# Patient Record
Sex: MALE | Race: WHITE | Employment: UNEMPLOYED | URBAN - METROPOLITAN AREA
[De-identification: names, ages, dates, MRNs, and addresses within clinical notes are randomized per-mention and may not be internally consistent; named-entity substitution may affect disease eponyms.]

---

## 2023-02-06 PROBLEM — R39.9 UTI SYMPTOMS: Status: ACTIVE | Noted: 2023-02-06

## 2023-02-06 PROBLEM — J34.89 NASAL AND SINUS DISCHARGE: Status: ACTIVE | Noted: 2023-02-06

## 2023-02-06 PROBLEM — M25.579 ANKLE PAIN: Status: ACTIVE | Noted: 2023-02-06

## 2023-02-06 PROBLEM — I10 BENIGN ESSENTIAL HYPERTENSION: Status: ACTIVE | Noted: 2023-02-06

## 2023-02-06 PROBLEM — R10.9 ABDOMINAL PAIN: Status: ACTIVE | Noted: 2023-02-06

## 2023-02-06 PROBLEM — K21.9 GASTROESOPHAGEAL REFLUX DISEASE WITHOUT ESOPHAGITIS: Status: ACTIVE | Noted: 2023-02-06

## 2023-02-06 PROBLEM — R76.8 POSITIVE ANA (ANTINUCLEAR ANTIBODY): Status: ACTIVE | Noted: 2023-02-06

## 2023-02-06 PROBLEM — M76.70 PERONEAL TENDONITIS: Status: ACTIVE | Noted: 2023-02-06

## 2023-02-06 PROBLEM — J32.9: Status: ACTIVE | Noted: 2023-02-06

## 2023-02-06 PROBLEM — J01.80 OTHER ACUTE SINUSITIS: Status: ACTIVE | Noted: 2023-02-06

## 2023-02-06 PROBLEM — F32.0 DEPRESSION, MAJOR, SINGLE EPISODE, MILD (CMS-HCC): Status: ACTIVE | Noted: 2023-02-06

## 2023-02-06 PROBLEM — J01.90 ACUTE SINUSITIS, UNSPECIFIED: Status: ACTIVE | Noted: 2023-02-06

## 2023-02-06 PROBLEM — J45.30 MILD PERSISTENT ASTHMA WITHOUT COMPLICATION (HHS-HCC): Status: ACTIVE | Noted: 2023-02-06

## 2023-02-06 PROBLEM — Z98.1 STATUS POST ANKLE FUSION: Status: ACTIVE | Noted: 2023-02-06

## 2023-02-06 PROBLEM — M79.673 FOOT PAIN: Status: ACTIVE | Noted: 2023-02-06

## 2023-02-06 PROBLEM — D36.9 ADENOMATOUS POLYP: Status: ACTIVE | Noted: 2023-02-06

## 2023-02-06 PROBLEM — J32.4 CHRONIC PANSINUSITIS: Status: ACTIVE | Noted: 2023-02-06

## 2023-02-06 PROBLEM — E11.9 CONTROLLED TYPE 2 DIABETES MELLITUS WITHOUT COMPLICATION (MULTI): Status: ACTIVE | Noted: 2023-02-06

## 2023-02-06 PROBLEM — R44.2 PHANTOSMIA: Status: ACTIVE | Noted: 2023-02-06

## 2023-02-06 PROBLEM — M25.673 ANKLE STIFFNESS: Status: ACTIVE | Noted: 2023-02-06

## 2023-02-06 PROBLEM — R44.8 FACIAL PRESSURE: Status: ACTIVE | Noted: 2023-02-06

## 2023-02-06 PROBLEM — M19.079 ANKLE ARTHRITIS: Status: ACTIVE | Noted: 2023-02-06

## 2023-02-06 PROBLEM — E78.2 MIXED HYPERLIPIDEMIA: Status: ACTIVE | Noted: 2023-02-06

## 2023-02-06 PROBLEM — R09.81 SINUS CONGESTION: Status: ACTIVE | Noted: 2023-02-06

## 2023-02-06 PROBLEM — Z96.661 HISTORY OF TOTAL REPLACEMENT OF RIGHT ANKLE: Status: ACTIVE | Noted: 2023-02-06

## 2023-02-06 PROBLEM — M25.879 ANKLE IMPINGEMENT SYNDROME: Status: ACTIVE | Noted: 2023-02-06

## 2023-02-06 PROBLEM — R11.10 VOMITING: Status: ACTIVE | Noted: 2023-02-06

## 2023-02-06 RX ORDER — ESOMEPRAZOLE MAGNESIUM 40 MG/1
40 CAPSULE, DELAYED RELEASE ORAL DAILY PRN
COMMUNITY
Start: 2021-04-07 | End: 2023-03-14 | Stop reason: SDUPTHER

## 2023-02-06 RX ORDER — DILTIAZEM HYDROCHLORIDE 240 MG/1
1 CAPSULE, EXTENDED RELEASE ORAL DAILY
COMMUNITY
Start: 2020-10-24

## 2023-02-06 RX ORDER — TAMSULOSIN HYDROCHLORIDE 0.4 MG/1
2 CAPSULE ORAL NIGHTLY
COMMUNITY
Start: 2020-11-04 | End: 2024-01-11 | Stop reason: SDUPTHER

## 2023-02-06 RX ORDER — VENLAFAXINE HYDROCHLORIDE 75 MG/1
75 CAPSULE, EXTENDED RELEASE ORAL DAILY
COMMUNITY
Start: 2022-05-26 | End: 2023-10-11 | Stop reason: DRUGHIGH

## 2023-02-06 RX ORDER — LOSARTAN POTASSIUM 25 MG/1
25 TABLET ORAL DAILY
COMMUNITY
Start: 2020-12-01

## 2023-02-06 RX ORDER — BUDESONIDE AND FORMOTEROL FUMARATE DIHYDRATE 160; 4.5 UG/1; UG/1
2 AEROSOL RESPIRATORY (INHALATION) 2 TIMES DAILY
COMMUNITY
Start: 2018-07-24

## 2023-02-06 RX ORDER — ALBUTEROL SULFATE 90 UG/1
2 AEROSOL, METERED RESPIRATORY (INHALATION) EVERY 4 HOURS PRN
COMMUNITY
Start: 2021-08-06

## 2023-02-06 RX ORDER — ROSUVASTATIN CALCIUM 10 MG/1
10 TABLET, COATED ORAL DAILY
COMMUNITY
Start: 2020-10-24 | End: 2023-10-10 | Stop reason: SDUPTHER

## 2023-02-06 RX ORDER — METFORMIN HYDROCHLORIDE 500 MG/1
1000 TABLET, EXTENDED RELEASE ORAL 2 TIMES DAILY
COMMUNITY
Start: 2018-07-14 | End: 2024-03-01 | Stop reason: SDUPTHER

## 2023-02-06 RX ORDER — TRIAMCINOLONE ACETONIDE 55 UG/1
1 SPRAY, METERED NASAL EVERY 12 HOURS
COMMUNITY
Start: 2022-05-05 | End: 2023-10-10

## 2023-03-06 PROBLEM — R11.10 VOMITING: Status: RESOLVED | Noted: 2023-02-06 | Resolved: 2023-03-06

## 2023-03-06 PROBLEM — G47.33 OBSTRUCTIVE SLEEP APNEA: Status: ACTIVE | Noted: 2023-03-06

## 2023-03-06 PROBLEM — R06.83 SNORING: Status: ACTIVE | Noted: 2023-03-06

## 2023-03-06 PROBLEM — E66.9 OBESITY, UNSPECIFIED: Status: ACTIVE | Noted: 2023-03-06

## 2023-03-06 PROBLEM — M79.671 PAIN IN BOTH FEET: Status: ACTIVE | Noted: 2023-03-06

## 2023-03-06 PROBLEM — M79.672 PAIN IN BOTH FEET: Status: ACTIVE | Noted: 2023-03-06

## 2023-03-06 PROBLEM — R39.9 UTI SYMPTOMS: Status: RESOLVED | Noted: 2023-02-06 | Resolved: 2023-03-06

## 2023-03-06 PROBLEM — E66.9 OBESITY, UNSPECIFIED: Status: RESOLVED | Noted: 2023-03-06 | Resolved: 2023-03-06

## 2023-03-06 PROBLEM — M25.571 RIGHT ANKLE PAIN: Status: ACTIVE | Noted: 2023-03-06

## 2023-03-06 PROBLEM — J01.80 OTHER ACUTE SINUSITIS: Status: RESOLVED | Noted: 2023-02-06 | Resolved: 2023-03-06

## 2023-03-14 ENCOUNTER — OFFICE VISIT (OUTPATIENT)
Dept: PRIMARY CARE | Facility: CLINIC | Age: 68
End: 2023-03-14
Payer: COMMERCIAL

## 2023-03-14 VITALS
HEIGHT: 72 IN | DIASTOLIC BLOOD PRESSURE: 80 MMHG | BODY MASS INDEX: 32.78 KG/M2 | RESPIRATION RATE: 16 BRPM | SYSTOLIC BLOOD PRESSURE: 124 MMHG | HEART RATE: 80 BPM | OXYGEN SATURATION: 95 % | TEMPERATURE: 97.5 F | WEIGHT: 242 LBS

## 2023-03-14 DIAGNOSIS — J01.10 ACUTE FRONTAL SINUSITIS, RECURRENCE NOT SPECIFIED: Primary | ICD-10-CM

## 2023-03-14 DIAGNOSIS — E11.9 CONTROLLED TYPE 2 DIABETES MELLITUS WITHOUT COMPLICATION, WITHOUT LONG-TERM CURRENT USE OF INSULIN (MULTI): ICD-10-CM

## 2023-03-14 DIAGNOSIS — K21.9 GASTROESOPHAGEAL REFLUX DISEASE, UNSPECIFIED WHETHER ESOPHAGITIS PRESENT: ICD-10-CM

## 2023-03-14 PROCEDURE — 4010F ACE/ARB THERAPY RXD/TAKEN: CPT | Performed by: FAMILY MEDICINE

## 2023-03-14 PROCEDURE — 1160F RVW MEDS BY RX/DR IN RCRD: CPT | Performed by: FAMILY MEDICINE

## 2023-03-14 PROCEDURE — 3074F SYST BP LT 130 MM HG: CPT | Performed by: FAMILY MEDICINE

## 2023-03-14 PROCEDURE — 1159F MED LIST DOCD IN RCRD: CPT | Performed by: FAMILY MEDICINE

## 2023-03-14 PROCEDURE — 99214 OFFICE O/P EST MOD 30 MIN: CPT | Performed by: FAMILY MEDICINE

## 2023-03-14 PROCEDURE — 1036F TOBACCO NON-USER: CPT | Performed by: FAMILY MEDICINE

## 2023-03-14 PROCEDURE — 3079F DIAST BP 80-89 MM HG: CPT | Performed by: FAMILY MEDICINE

## 2023-03-14 RX ORDER — ESOMEPRAZOLE MAGNESIUM 40 MG/1
40 CAPSULE, DELAYED RELEASE ORAL
Qty: 30 CAPSULE | Refills: 1 | Status: SHIPPED | OUTPATIENT
Start: 2023-03-14 | End: 2023-03-22 | Stop reason: SDUPTHER

## 2023-03-14 RX ORDER — AZITHROMYCIN 250 MG/1
TABLET, FILM COATED ORAL
Qty: 6 TABLET | Refills: 0 | Status: SHIPPED | OUTPATIENT
Start: 2023-03-14 | End: 2023-03-19

## 2023-03-14 RX ORDER — ESOMEPRAZOLE MAGNESIUM 40 MG/1
40 CAPSULE, DELAYED RELEASE ORAL
Qty: 90 CAPSULE | Refills: 1 | Status: SHIPPED | OUTPATIENT
Start: 2023-03-14 | End: 2023-03-14 | Stop reason: SDUPTHER

## 2023-03-14 ASSESSMENT — ENCOUNTER SYMPTOMS
WHEEZING: 0
SINUS PAIN: 1
SHORTNESS OF BREATH: 0
SORE THROAT: 1
NAUSEA: 1
SINUS PRESSURE: 1
FATIGUE: 1
COUGH: 1

## 2023-03-14 ASSESSMENT — PAIN SCALES - GENERAL: PAINLEVEL: 0-NO PAIN

## 2023-03-14 NOTE — PROGRESS NOTES
Subjective   Patient ID: Rui Shah is a 67 y.o. male who presents for Cough (Bad congestion , sinus headache x 1 month..Food Insecurity: Not on file and meds making sick to stomach, trouble sleeping ...took covid test neg.).    Cough  Associated symptoms include ear pain and a sore throat. Pertinent negatives include no shortness of breath or wheezing.      For last month, started with chest congestion and nonproductive cough.  Has been going on for a couple weeks and improved but still coughing.  Then noticed more in the sinuses.  Significant headaches. Negative COVID test.  Meds are making him nauseous.  Trouble sleeping.  Headaches are significant.  No fevers.  Not checking blood sugars. Eating well.  No one sick at home.   Review of Systems   Constitutional:  Positive for fatigue.   HENT:  Positive for congestion, ear pain, sinus pressure, sinus pain and sore throat.    Respiratory:  Positive for cough. Negative for shortness of breath and wheezing.    Gastrointestinal:  Positive for nausea.       Objective   /80 (BP Location: Left arm, Patient Position: Sitting, BP Cuff Size: Large adult)   Pulse 80   Temp 36.4 °C (97.5 °F) (Oral)   Resp 16   Ht 1.829 m (6')   Wt 110 kg (242 lb)   SpO2 95%   BMI 32.82 kg/m²     Physical Exam  Constitutional:       Appearance: Normal appearance.   HENT:      Head: Normocephalic and atraumatic.      Right Ear: External ear normal.      Left Ear: External ear normal.      Nose: Nose normal.   Eyes:      Extraocular Movements: Extraocular movements intact.      Conjunctiva/sclera: Conjunctivae normal.      Pupils: Pupils are equal, round, and reactive to light.   Cardiovascular:      Rate and Rhythm: Normal rate and regular rhythm.   Pulmonary:      Effort: Pulmonary effort is normal.      Breath sounds: Normal breath sounds.   Abdominal:      General: Abdomen is flat.      Palpations: Abdomen is soft.   Musculoskeletal:         General: Normal range of motion.       Cervical back: Normal range of motion and neck supple.   Skin:     General: Skin is warm.   Neurological:      General: No focal deficit present.      Mental Status: He is alert and oriented to person, place, and time.   Psychiatric:         Mood and Affect: Mood normal.         Assessment/Plan   Problem List Items Addressed This Visit          Digestive    Gastroesophageal reflux disease     Stable   Needs refill of PPI         Relevant Medications    esomeprazole (NexIUM) 40 mg DR capsule       Endocrine/Metabolic    Controlled type 2 diabetes mellitus without complication (CMS/[facility])     Has not been checking glucose regularly  Continue Metformin  Follow up for DMII visit in office to check A1C            Infectious/Inflammatory    Acute sinusitis, unspecified - Primary     Start antibiotic, take with food or probiotic  Recommend symptomatic treatment including increased hydration, sinus rinse, use of analgesics for symptoms  Follow up if symptoms fail to improve or worsen         Relevant Medications    azithromycin (Zithromax) 250 mg tablet

## 2023-03-14 NOTE — ASSESSMENT & PLAN NOTE
Start antibiotic, take with food or probiotic  Recommend symptomatic treatment including increased hydration, sinus rinse, use of analgesics for symptoms  Follow up if symptoms fail to improve or worsen

## 2023-03-14 NOTE — ASSESSMENT & PLAN NOTE
Has not been checking glucose regularly  Continue Metformin  Follow up for DMII visit in office to check A1C

## 2023-03-21 ENCOUNTER — TELEPHONE (OUTPATIENT)
Dept: PRIMARY CARE | Facility: CLINIC | Age: 68
End: 2023-03-21
Payer: MEDICARE

## 2023-03-21 DIAGNOSIS — K21.9 GASTROESOPHAGEAL REFLUX DISEASE, UNSPECIFIED WHETHER ESOPHAGITIS PRESENT: ICD-10-CM

## 2023-03-22 RX ORDER — ESOMEPRAZOLE MAGNESIUM 40 MG/1
40 CAPSULE, DELAYED RELEASE ORAL
Qty: 90 CAPSULE | Refills: 1 | Status: SHIPPED | OUTPATIENT
Start: 2023-03-22 | End: 2023-09-18

## 2023-03-27 ENCOUNTER — TELEPHONE (OUTPATIENT)
Dept: PRIMARY CARE | Facility: CLINIC | Age: 68
End: 2023-03-27
Payer: MEDICARE

## 2023-03-29 DIAGNOSIS — J01.10 ACUTE FRONTAL SINUSITIS, RECURRENCE NOT SPECIFIED: Primary | ICD-10-CM

## 2023-03-29 RX ORDER — AMOXICILLIN AND CLAVULANATE POTASSIUM 875; 125 MG/1; MG/1
875 TABLET, FILM COATED ORAL 2 TIMES DAILY
Qty: 20 TABLET | Refills: 0 | Status: SHIPPED | OUTPATIENT
Start: 2023-03-29 | End: 2023-04-08

## 2023-03-31 DIAGNOSIS — J01.10 ACUTE FRONTAL SINUSITIS, RECURRENCE NOT SPECIFIED: ICD-10-CM

## 2023-03-31 RX ORDER — AMOXICILLIN AND CLAVULANATE POTASSIUM 875; 125 MG/1; MG/1
875 TABLET, FILM COATED ORAL 2 TIMES DAILY
Qty: 20 TABLET | Refills: 0 | Status: CANCELLED | OUTPATIENT
Start: 2023-03-31 | End: 2023-04-10

## 2023-06-30 ENCOUNTER — HOSPITAL ENCOUNTER (OUTPATIENT)
Dept: DATA CONVERSION | Facility: HOSPITAL | Age: 68
End: 2023-06-30
Attending: ORTHOPAEDIC SURGERY | Admitting: ORTHOPAEDIC SURGERY
Payer: MEDICARE

## 2023-06-30 DIAGNOSIS — E78.5 HYPERLIPIDEMIA, UNSPECIFIED: ICD-10-CM

## 2023-06-30 DIAGNOSIS — Z96.661 PRESENCE OF RIGHT ARTIFICIAL ANKLE JOINT: ICD-10-CM

## 2023-06-30 DIAGNOSIS — E11.42 TYPE 2 DIABETES MELLITUS WITH DIABETIC POLYNEUROPATHY (MULTI): ICD-10-CM

## 2023-06-30 DIAGNOSIS — I10 ESSENTIAL (PRIMARY) HYPERTENSION: ICD-10-CM

## 2023-06-30 DIAGNOSIS — Z98.1 ARTHRODESIS STATUS: ICD-10-CM

## 2023-06-30 DIAGNOSIS — E66.9 OBESITY, UNSPECIFIED: ICD-10-CM

## 2023-06-30 DIAGNOSIS — M24.571 CONTRACTURE, RIGHT ANKLE: ICD-10-CM

## 2023-06-30 DIAGNOSIS — J45.909 UNSPECIFIED ASTHMA, UNCOMPLICATED (HHS-HCC): ICD-10-CM

## 2023-06-30 DIAGNOSIS — Z79.84 LONG TERM (CURRENT) USE OF ORAL HYPOGLYCEMIC DRUGS: ICD-10-CM

## 2023-06-30 DIAGNOSIS — M21.171 VARUS DEFORMITY, NOT ELSEWHERE CLASSIFIED, RIGHT ANKLE: ICD-10-CM

## 2023-06-30 LAB
POCT GLUCOSE: 202 MG/DL (ref 74–99)
POCT GLUCOSE: 242 MG/DL (ref 74–99)
POCT GLUCOSE: 265 MG/DL (ref 74–99)

## 2023-07-21 DIAGNOSIS — E11.9 CONTROLLED TYPE 2 DIABETES MELLITUS WITHOUT COMPLICATION, UNSPECIFIED WHETHER LONG TERM INSULIN USE (MULTI): Primary | ICD-10-CM

## 2023-08-01 ENCOUNTER — TELEMEDICINE (OUTPATIENT)
Dept: PHARMACY | Facility: HOSPITAL | Age: 68
End: 2023-08-01
Payer: MEDICARE

## 2023-08-01 DIAGNOSIS — E11.40 TYPE 2 DIABETES MELLITUS WITH DIABETIC NEUROPATHY, WITHOUT LONG-TERM CURRENT USE OF INSULIN (MULTI): Primary | ICD-10-CM

## 2023-08-01 DIAGNOSIS — E11.9 CONTROLLED TYPE 2 DIABETES MELLITUS WITHOUT COMPLICATION, UNSPECIFIED WHETHER LONG TERM INSULIN USE (MULTI): ICD-10-CM

## 2023-08-01 RX ORDER — DULAGLUTIDE 0.75 MG/.5ML
0.75 INJECTION, SOLUTION SUBCUTANEOUS
Qty: 2 ML | Refills: 0 | Status: SHIPPED | OUTPATIENT
Start: 2023-08-01 | End: 2023-10-09 | Stop reason: DRUGHIGH

## 2023-08-01 NOTE — PROGRESS NOTES
"[facility] Pharmacist Visit - Diabetes Management  Referring Provider: Juliane Guzman MD    Subjective   Rui Shah is a 67 y.o. male who presents for Diabetes.    HPI  PMH significant for T2DM, HTN, GERD, Depression, asthma, HLD, MARIELA.  Patient has no known history of medullary thyroid cancer, pancreatitis. Previous UTIs.  Known DM complications include peripheral neuropathy. Diagnosed 6-7 years ago  Special needs/barriers to therapy: Patient will be transitioning to Medicare coverage in next few months    Lifestyle  Diet: 3 meals/day.  BK: small meal  LN: Couple sandwiches  DN: Chicken or hamburgers w/ green beans or broccoli when his wife cooks. Takeout 2-3 times per week  Snacks: Cookies, popsicles, ice cream  Drinks: Water, occasionally diet    Physical Activity: Limited due to ankle surgery    Diabetes Management  Current Medications: Metformin XR 500mg 2 tab qAM and 2 tabs qPM  Previous Medications: None    Adherence: Takes medication as directed, misses/late doses once per week  Adverse Effects: None    Risk Reducing Meds  On ACEi/ARB: Yes  On SGLT2i: No (Caution - history of UTIs)  On GLP1-RA: No   On Statin: Yes     Glucose Monitoring  Glucometer/CGM Type: Unknown    Previous home BG readings: None  Current home BG readings: None - not checking recently    Hypoglycemia: Patient denies signs and symptoms  Hyperglycemia: Blurry vision    Diabetic Eye Exam: Up to date, summer 2022  Monofilament Foot Exam: Needs completed, last unknown    Comorbidity Assessment  HTN Diagnosis? Yes, HTN Assessment  BP Readings from Last 3 Encounters:   03/14/23 124/80   12/19/22 125/75   09/26/22 (!) 145/109     Current Regimen: losartan 25mg daily, diltiazem ER 240mg daily  BP Cuff at home? No  SMBG Readings: None  HTN at goal? Yes     HLD Diagnosis? Yes, HLD Assessment  Lab Results   Component Value Date    CHOL 145 12/19/2022     Lab Results   Component Value Date    HDL 49.1 12/19/2022     No results found for: \"LDLCALC\"  Lab " "Results   Component Value Date    TRIG 134 12/19/2022     LDL: 69 (12/19/22)    Primary prevention, Intermediate risk (DM)  LDL Goal: < 100 mg/dL  Current Regimen: rosuvastatin 10mg daily  HLD at goal? Yes     HF Diagnosis? No    Secondary Prevention  ASCVD Risk:   The 10-year ASCVD risk score (Lamin LORENZ, et al., 2019) is: 22.5%    Values used to calculate the score:      Age: 67 years      Sex: Male      Is Non- : No      Diabetic: Yes      Tobacco smoker: No      Systolic Blood Pressure: 124 mmHg      Is BP treated: Yes      HDL Cholesterol: 49.1 mg/dL      Total Cholesterol: 145 mg/dL  On Statin?: Yes, rosuvastatin 10mg daily    Immunizations Needed: Prevnar, Shingrix, Tdap, and Hepatitis B Series    Objective   Vitals  BP Readings from Last 2 Encounters:   03/14/23 124/80   12/19/22 125/75     BMI Readings from Last 1 Encounters:   03/14/23 32.82 kg/m²      Labs  A1C  Lab Results   Component Value Date    HGBA1C 9.6 (A) 06/26/2023    HGBA1C 6.7 04/02/2019    HGBA1C 8.1 11/27/2018     BMP  Lab Results   Component Value Date    CALCIUM 9.1 06/26/2023     (L) 06/26/2023    K 4.2 06/26/2023    CO2 20 (L) 06/26/2023     06/26/2023    BUN 21 06/26/2023    CREATININE 0.98 06/26/2023    GFRMALE 85 06/26/2023     LFTs  Lab Results   Component Value Date    ALT 42 06/23/2022    AST 48 (H) 06/23/2022    ALKPHOS 72 06/23/2022    BILITOT 0.6 06/23/2022     FLP  Lab Results   Component Value Date    TRIG 134 12/19/2022    CHOL 145 12/19/2022    HDL 49.1 12/19/2022     Urine Microalbumin  No results found for: \"ALBUR\", \"[id number]\"   Vitamin B12  No results found for: \"CRBKQRAD63\"      Assessment/Plan   Problem List Items Addressed This Visit       Type 2 diabetes mellitus with diabetic neuropathy, without long-term current use of insulin (CMS/[facility]) - Primary     Diabetes: Uncontrolled with last A1c 9.6% on 6/26/23. Current regimen includes Metformin XR 1000mg twice daily. Home BG readings not " checked recently. Due to A1c above goal and indication for weight loss, plan to start GLP-1 agonist.  Taking Trulicity 0.75mg once weekly as directed. Order sent to local [facility].  Continue taking metformin as directed  Start to monitor and record BG daily  Recommended updated immunizations: Shingrix, Prevnar          Patient Education:  Fasting BG goal , Postprandial BG goal <180 mg/dL  A1C goal <7%  Reviewed dietary recommendations: Substituting snacks and meals with lower-carb options (Ie: sugar free popsicles, low-calorie ice cream, low-carb bread)  Recommended integrating exercise into weekly routine as able.   For limited mobility, consider seated forms of exercise that focus of upper body movement and strength building.  Counseled patient on relevant MOA, expectations, side effects, duration of therapy, contraindications, administration, and monitoring parameters  All questions and concerns addressed. Contact pharmacist with any further questions or concerns prior to next appointment.    Clinical Pharmacist follow-up: 8/22/23, Telehealth visit  Next PCP appointment: Not scheduled    Gini Lockhart, PharmD  Clinical Pharmacist  08/01/23    Continue all meds under the continuation of care with the referring provider and clinical pharmacy team.  Verbal consent to manage patient's drug therapy was obtained from the patient. They were informed they may decline to participate or withdraw from participation in pharmacy services at any time.

## 2023-08-01 NOTE — ASSESSMENT & PLAN NOTE
Diabetes: Uncontrolled with last A1c 9.6% on 6/26/23. Current regimen includes Metformin XR 1000mg twice daily. Home BG readings not checked recently. Due to A1c above goal and indication for weight loss, plan to start GLP-1 agonist.  Taking Trulicity 0.75mg once weekly as directed. [facility].  Continue taking metformin as directed  Start to monitor and record BG daily  Recommended updated immunizations: Shingrix, Prevnar

## 2023-08-22 ENCOUNTER — TELEMEDICINE (OUTPATIENT)
Dept: PHARMACY | Facility: HOSPITAL | Age: 68
End: 2023-08-22
Payer: MEDICARE

## 2023-08-22 DIAGNOSIS — E11.40 TYPE 2 DIABETES MELLITUS WITH DIABETIC NEUROPATHY, WITHOUT LONG-TERM CURRENT USE OF INSULIN (MULTI): ICD-10-CM

## 2023-08-22 NOTE — PROGRESS NOTES
[facility] Pharmacist Visit - Diabetes Management  Referring Provider: Juliane Guzman MD    Subjective   Rui Shah is a 68 y.o. male who presents for Diabetes.    HPI  PMH significant for T2DM, HTN, GERD, Depression, asthma, HLD, MARIELA.  Patient has no known history of medullary thyroid cancer, pancreatitis. Previous UTIs.  Diagnosed 6-7 years ago. Known DM complications include peripheral neuropathy.  Special needs/barriers to therapy: None identified    [facility] Patient informed of status and $30 copay.    Lifestyle - Watching portions, limiting sugar, looking more at food labels  Diet: 3 meals/day.  BK: small meal  LN: Couple sandwiches  DN: Chicken or hamburgers w/ green beans or broccoli when his wife cooks. Takeout 2-3 times per week  Snacks: Trail mix, fruit  Drinks: Water, occasionally diet soda    Physical Activity: Limited due to ankle surgery    Diabetes Management  Current Medications: Metformin XR 500mg 2 tabs qAM and 2 tabs qPM  Previous Medications: None    Adherence: Takes medication as directed, misses/late doses once per week  Adverse Effects: None    Risk Reducing Meds  On ACEi/ARB: Yes  On SGLT2i: No (Caution - history of UTIs)  On GLP1-RA: Yes  On Statin: Yes     Glucose Monitoring  Glucometer/CGM Type: Unknown    Previous home BG readings: None  Current home BG readings: -220    Hypoglycemia: Patient denies signs and symptoms  Hyperglycemia: Blurry vision    Diabetic Eye Exam: Up to date, summer 2022  Monofilament Foot Exam: Needs completed, last unknown    Comorbidity Assessment  HTN Diagnosis? Yes, HTN Assessment  BP Readings from Last 3 Encounters:   03/14/23 124/80   12/19/22 125/75   09/26/22 (!) 145/109     Current Regimen: losartan 25mg daily, diltiazem ER 240mg daily  BP Cuff at home? No  SMBG Readings: None  HTN at goal? Yes     HLD Diagnosis? Yes, HLD Assessment  Lab Results   Component Value Date    CHOL 145 12/19/2022     Lab Results   Component Value Date     "HDL 49.1 12/19/2022     No results found for: \"LDLCALC\"  Lab Results   Component Value Date    TRIG 134 12/19/2022     LDL: 69 (12/19/22)    Primary prevention, Intermediate risk (DM)  LDL Goal: < 100 mg/dL  Current Regimen: rosuvastatin 10mg daily  HLD at goal? Yes      Secondary Prevention  ASCVD Risk:   The 10-year ASCVD risk score (Lamin LORENZ, et al., 2019) is: 24.3%    Values used to calculate the score:      Age: 68 years      Sex: Male      Is Non- : No      Diabetic: Yes      Tobacco smoker: No      Systolic Blood Pressure: 124 mmHg      Is BP treated: Yes      HDL Cholesterol: 49.1 mg/dL      Total Cholesterol: 145 mg/dL  On Statin?: Yes, rosuvastatin 10mg daily    Immunizations Needed: Prevnar, Shingrix, Tdap, and Hepatitis B Series    Objective   Vitals  BP Readings from Last 2 Encounters:   03/14/23 124/80   12/19/22 125/75     BMI Readings from Last 1 Encounters:   03/14/23 32.82 kg/m²      Labs  A1C  Lab Results   Component Value Date    HGBA1C 9.6 (A) 06/26/2023    HGBA1C 6.7 04/02/2019    HGBA1C 8.1 11/27/2018     BMP  Lab Results   Component Value Date    CALCIUM 9.1 06/26/2023     (L) 06/26/2023    K 4.2 06/26/2023    CO2 20 (L) 06/26/2023     06/26/2023    BUN 21 06/26/2023    CREATININE 0.98 06/26/2023    GFRMALE 85 06/26/2023     LFTs  Lab Results   Component Value Date    ALT 42 06/23/2022    AST 48 (H) 06/23/2022    ALKPHOS 72 06/23/2022    BILITOT 0.6 06/23/2022     FLP  Lab Results   Component Value Date    TRIG 134 12/19/2022    CHOL 145 12/19/2022    HDL 49.1 12/19/2022     Urine Microalbumin  No results found for: \"ALBUR\", \"[id number]\"   Vitamin B12  No results found for: \"[id number]\"      Assessment/Plan   Problem List Items Addressed This Visit       Type 2 diabetes mellitus with diabetic neuropathy, without long-term current use of insulin (CMS/[facility])     Diabetes: Uncontrolled with last A1c 9.6% on 6/26/23. Current regimen includes metformin. Trulicity " PA approved today, patient will start medication this week. Home BG readings FBG ranging from 130-220. Patient has made significant dietary improvements, reading food labels and limiting carbs and portion sizes. Due to FBG above goal, plan to start Trulicity as previously discussed.  Start taking Trulicity 0.75mg once daily  Continue taking metformin as directed  Continue to monitor and record BG daily  Continue to focus on dietary improvements, great changes so far.          Patient Education:  Fasting BG goal , Postprandial BG goal <180 mg/dL  A1C goal <7%  Reviewed dietary recommendations: Substituting snacks and meals with lower-carb options  Counseled patient on relevant MOA, expectations, side effects, duration of therapy, contraindications, administration, and monitoring parameters  All questions and concerns addressed. Contact pharmacist with any further questions or concerns prior to next appointment.    Clinical Pharmacist follow-up: 9/12/23 at 1:30pm, Telehealth visit  Next PCP appointment: Not scheduled    Gini Lockhart [facility]  Clinical Pharmacist  08/22/23    Continue all meds under the continuation of care with the referring provider and clinical pharmacy team.  Verbal consent to manage patient's drug therapy was obtained from the patient. They were informed they may decline to participate or withdraw from participation in pharmacy services at any time.

## 2023-08-22 NOTE — ASSESSMENT & PLAN NOTE
Diabetes: Uncontrolled with last A1c 9.6% on 6/26/23. Current regimen includes metformin. Trulicity PA approved today, patient will start medication this week. Home BG readings FBG ranging from 130-220. Patient has made significant dietary improvements, reading food labels and limiting carbs and portion sizes. Due to FBG above goal, plan to start Trulicity as previously discussed.  Start taking Trulicity 0.75mg once daily  Continue taking metformin as directed  Continue to monitor and record BG daily  Continue to focus on dietary improvements, great changes so far.

## 2023-09-11 NOTE — PROGRESS NOTES
[facility] Pharmacist Visit - Diabetes Management  Referring Provider: Juliane Guzman MD    Subjective   Rui Shah is a 68 y.o. male who presents for No chief complaint on file..    HPI  PMH significant for T2DM, HTN, GERD, Depression, asthma, HLD, MARIELA.  Patient has no known history of medullary thyroid cancer, pancreatitis. Previous UTIs.  Diagnosed 6-7 years ago. Known DM complications include peripheral neuropathy.  Special needs/barriers to therapy: None identified    Lifestyle: Has noticed appetite is reduced, eating smaller portions.  Diet: 3 meals/day.  BK: small meal  LN: Couple sandwiches  DN: Chicken or hamburgers w/ green beans or broccoli when his wife cooks. Takeout 2-3 times per week  Snacks: Trail mix, fruit  Drinks: Water, occasionally diet soda     Physical Activity: Limited due to ankle surgery      Diabetes Management  Current Medications: Metformin XR 500mg 2 tabs qAM and 2 tabs qPM, Trulicity 0.75mg weekly (3 doses completed, Saturdays)  Previous Medications: None     Adherence:  Takes medication as directed, misses/late doses once per week .   Adverse Effects: None    Risk Reducing Meds  On ACEi/ARB: Yes   On SGLT2i: No (Caution - history of UTIs)   On GLP1-RA: Yes   On Statin: Yes     Glucose Monitoring  Glucometer/CGM Type: Glucometer    Previous home BG readings: (8/22/23) -220  Current home BG readings: -170    Hypoglycemia: Patient denies signs and symptoms and No BG readings < 80 mg/dL  Hyperglycemia: Denies signs and symptoms    Diabetic Eye Exam: Up to date, completed summer 2022  Monofilament Foot Exam: Needs completed, last unknown      Secondary Prevention  ASCVD Risk:   The 10-year ASCVD risk score (Lamin LORENZ, et al., 2019) is: 24.3%    Values used to calculate the score:      Age: 68 years      Sex: Male      Is Non- : No      Diabetic: Yes      Tobacco smoker: No      Systolic Blood Pressure: 124 mmHg      Is BP treated: Yes      HDL Cholesterol:  "49.1 mg/dL      Total Cholesterol: 145 mg/dL  On Statin?: Yes, rosuvastatin 10mg daily    BP Readings from Last 3 Encounters:   03/14/23 124/80   12/19/22 125/75   09/26/22 (!) 145/109     Current HTN Regimen: losartan 25mg daily, diltiazem ER 240mg daily   HTN at goal? Yes    Immunizations Needed: Prevnar, Shingrix, Tdap, and Hepatitis B Series  Tobacco Use: non-smoker      Objective   Vitals  BP Readings from Last 2 Encounters:   03/14/23 124/80   12/19/22 125/75     BMI Readings from Last 1 Encounters:   03/14/23 32.82 kg/m²      Labs  A1C  Lab Results   Component Value Date    HGBA1C 9.6 (A) 06/26/2023    HGBA1C 6.7 04/02/2019    HGBA1C 8.1 11/27/2018     BMP  Lab Results   Component Value Date    CALCIUM 9.1 06/26/2023     (L) 06/26/2023    K 4.2 06/26/2023    CO2 20 (L) 06/26/2023     06/26/2023    BUN 21 06/26/2023    CREATININE 0.98 06/26/2023    GFRMALE 85 06/26/2023     LFTs  Lab Results   Component Value Date    ALT 42 06/23/2022    AST 48 (H) 06/23/2022    ALKPHOS 72 06/23/2022    BILITOT 0.6 06/23/2022     FLP  Lab Results   Component Value Date    TRIG 134 12/19/2022    CHOL 145 12/19/2022    LDLF 69 12/19/2022    HDL 49.1 12/19/2022     Urine Microalbumin  No results found for: \"MICROALBCREA\"  Vitamin B12  No results found for: \"HFTUCYRR37\"      Assessment/Plan   Problem List Items Addressed This Visit    None    Patient Education:  Counseled patient on relevant MOA, expectations, side effects, duration of therapy, contraindications, administration, and monitoring parameters.  All questions and concerns addressed. Contact pharmacist with any further questions or concerns prior to next appointment.  Reviewed BG goals: Fasting BG goal , Postprandial BG goal <180 mg/dL, A1C goal <7%    Clinical Pharmacist follow-up: 10/9/23 at 10:30am, Telehealth visit  Next PCP appointment: Not scheduled    Gini Lockhart [facility]  Clinical Pharmacist  09/11/23    Continue all meds under the " continuation of care with the referring provider and clinical pharmacy team.  Verbal consent to manage patient's drug therapy was obtained from the patient. They were informed they may decline to participate or withdraw from participation in pharmacy services at any time.

## 2023-09-12 ENCOUNTER — TELEMEDICINE (OUTPATIENT)
Dept: PHARMACY | Facility: HOSPITAL | Age: 68
End: 2023-09-12
Payer: MEDICARE

## 2023-09-12 DIAGNOSIS — E11.40 TYPE 2 DIABETES MELLITUS WITH DIABETIC NEUROPATHY, WITHOUT LONG-TERM CURRENT USE OF INSULIN (MULTI): ICD-10-CM

## 2023-09-12 RX ORDER — DULAGLUTIDE 1.5 MG/.5ML
1.5 INJECTION, SOLUTION SUBCUTANEOUS
Qty: 2 ML | Refills: 0 | Status: SHIPPED | OUTPATIENT
Start: 2023-09-12 | End: 2023-10-09 | Stop reason: DRUGHIGH

## 2023-09-12 NOTE — ASSESSMENT & PLAN NOTE
Diabetes: Uncontrolled with last A1c 9.6% on 6/26/23. Current regimen includes metformin and Trulicity 0.75mg weekly. Has completed 3 doses of Trulicity and tolerating well. He has noticed reduced appetite and is eating smaller portions. Home BG readings reduced with -170. Due to FBG still above goal and tolerating medication well, plan to increase dose of Trulicity to 1.5mg following completion of last 0.75mg dose..  Increase Trulicity to 1.5mg once weekly after completing remaining dose of 0.75mg.  Continue taking metformin.  Continue to monitor and record BG daily.  Continue to focus on dietary improvements.

## 2023-09-18 DIAGNOSIS — K21.9 GASTROESOPHAGEAL REFLUX DISEASE, UNSPECIFIED WHETHER ESOPHAGITIS PRESENT: ICD-10-CM

## 2023-09-18 RX ORDER — ESOMEPRAZOLE MAGNESIUM 40 MG/1
40 CAPSULE, DELAYED RELEASE ORAL
Qty: 90 CAPSULE | Refills: 3 | Status: SHIPPED | OUTPATIENT
Start: 2023-09-18 | End: 2023-10-12 | Stop reason: SDUPTHER

## 2023-09-28 DIAGNOSIS — E11.40 TYPE 2 DIABETES MELLITUS WITH DIABETIC NEUROPATHY, WITHOUT LONG-TERM CURRENT USE OF INSULIN (MULTI): ICD-10-CM

## 2023-09-28 DIAGNOSIS — K21.9 GASTROESOPHAGEAL REFLUX DISEASE, UNSPECIFIED WHETHER ESOPHAGITIS PRESENT: ICD-10-CM

## 2023-09-28 RX ORDER — DULAGLUTIDE 1.5 MG/.5ML
1.5 INJECTION, SOLUTION SUBCUTANEOUS
OUTPATIENT
Start: 2023-09-28

## 2023-09-29 VITALS
HEART RATE: 89 BPM | RESPIRATION RATE: 16 BRPM | HEIGHT: 73 IN | SYSTOLIC BLOOD PRESSURE: 137 MMHG | DIASTOLIC BLOOD PRESSURE: 92 MMHG | WEIGHT: 236.77 LBS | TEMPERATURE: 97.3 F | BODY MASS INDEX: 31.38 KG/M2

## 2023-09-30 NOTE — H&P
History of Present Illness:   History Present Illness:  Reason for surgery: R distal tibial/fibula osteotomy   HPI:     Patient is a 67-year-old male with non-insulin-dependent diabetes. Patient had a right ankle replacement done by Dr. Keon Amador back in 2018. He says he is never  really been able to dorsiflex his ankle. He had a equinus contracture release after the surgery which really did not help he is tried bracing which did not help. He also has a left ankle fusion and he feels he walks on the lateral border of his foot on  the side but the left is not as nearly as bad as the right problem. He says he feels like it is getting worse. 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 At that time I recommended a distal tibial osteotomy be done with a fibular osteotomy using a anterior plate.    Allergies:        Allergies:  ·  gabapentin : Facial Swelling  ·  gabapentin  containing compounds: Unknown, [facility]    Home Medication Review:   Home Medications Reviewed: yes     Impression/Procedure:   ·  Impression and Planned Procedure: R distal tibial/fibula osteotomy       ERAS (Enhanced Recovery After Surgery):  ·  ERAS Patient: no       Vital Signs:  Temperature C: 36.3 degrees C   Temperature F: 97.3 degrees F   Heart Rate: 89 beats per minute   Respiratory Rate: 16 breath per minute   Blood Pressure Systolic: 137 mm/Hg   Blood Pressure Diastolic: 92 mm/Hg     Physical Exam by System:    Respiratory/Thorax: non labored breathing, on room  air   Cardiovascular: extremities warm and well perfused   Musculoskeletal: RLE:   -Skin intact  -Tender at site of injury with painful ROM.  -Motor intact in DF/PF/EHL/FHL  -SILT in saph/sural/SPN/DPN distributions  -Foot warm, well perfused  -DP/PT pulse, brisk cap refill  -Compartments soft and compressible.    Psychological: appropriate behavior     Consent:   COVID-19 Consent:  ·  COVID-19 Risk Consent Surgeon has reviewed key risks related to the risk of sandy COVID-19 and if they contract COVID-19 what the risks are.     Attestation:   Note Completion:  I am a:  Resident/Fellow   Attending Attestation I saw and evaluated the patient.  I personally obtained the key and critical portions of the history and physical exam or was physically present for key and  critical portions performed by the resident/fellow. I reviewed the resident/fellow?s documentation and discussed the patient with the resident/fellow.  I agree with the resident/fellow?s medical decision making as documented in the note.     I personally evaluated the patient on 30-Jun-2023         Electronic Signatures:  Lucy Ji (Resident))  (Signed 30-Jun-2023 06:11)   Authored: History of Present Illness, Allergies, Home  Medication Review, Impression/Procedure, ERAS, Physical Exam, Consent, Note Completion  Keon Kaur)  (Signed 30-Jun-2023 13:17)   Authored: Physical Exam, Note Completion   Co-Signer: History of Present Illness, Allergies, Home Medication Review, Impression/Procedure, ERAS, Physical Exam, Consent, Note Completion      Last Updated: 30-Jun-2023 13:17 by Keon Kaur)

## 2023-10-02 NOTE — OP NOTE
PROCEDURE DETAILS    Preoperative Diagnosis:  Right ankle impingement with distal tibia deformity   Postoperative Diagnosis:  Right ankle impingement with distal tibia deformity   Surgeon: Dr. Kaur  Resident/Fellow/Other Assistant: Velia/Felisha    Procedure:  1) Right distal tibia osteotomy   2) Right distal fibula osteotomy   3) Application of bone allograft   Anesthesia: General  Estimated Blood Loss: 25 cc    Blood Replaced: None  Findings: Distal tibia varus and apex anterior deformity with correction after osteotomy   Specimens(s) Collected: no,     Complications: None  Urine Output: None  Drains and/or Catheters: None  Implants: Synthes 3.5 mm small frag anterolateral distal tibia plate with locking and cortical screws  Tourniquet Times: 95 minutes at 300 mm Hg  Additional Details: Weightbearing Status/precautions: NWB RLE in splint  Imaging: None  Perioperative ABx: None   DVT PPx: ASA 81 mg BID  Dressing: Splint, maintain until follow up   Drain: None  Vila: None    Dispo: Home from PACU  Please call or page with questions or concerns.   Patient Returned To/Condition: PACU to home/good                                Attestation:   Note Completion:  Attending Attestation I was present for the entire procedure    I am a: Resident/Fellow         Electronic Signatures:  Clark Gunn (Resident))  (Signed 30-Jun-2023 10:42)   Authored: Post-Operative Note, Chart Review, Note Completion  Keon Kaur)  (Signed 30-Jun-2023 13:21)   Authored: Note Completion   Co-Signer: Post-Operative Note, Chart Review, Note Completion      Last Updated: 30-Jun-2023 13:21 by Keon Kaur)

## 2023-10-05 ENCOUNTER — TREATMENT (OUTPATIENT)
Dept: PHYSICAL THERAPY | Facility: CLINIC | Age: 68
End: 2023-10-05
Payer: MEDICARE

## 2023-10-05 DIAGNOSIS — M25.579 ANKLE PAIN: Primary | ICD-10-CM

## 2023-10-09 ENCOUNTER — TELEMEDICINE (OUTPATIENT)
Dept: PHARMACY | Facility: HOSPITAL | Age: 68
End: 2023-10-09
Payer: MEDICARE

## 2023-10-09 DIAGNOSIS — E11.40 TYPE 2 DIABETES MELLITUS WITH DIABETIC NEUROPATHY, WITHOUT LONG-TERM CURRENT USE OF INSULIN (MULTI): Primary | ICD-10-CM

## 2023-10-09 RX ORDER — DULAGLUTIDE 0.75 MG/.5ML
0.75 INJECTION, SOLUTION SUBCUTANEOUS
Qty: 2 ML | Refills: 0 | Status: SHIPPED | OUTPATIENT
Start: 2023-10-09 | End: 2023-11-07 | Stop reason: SINTOL

## 2023-10-09 NOTE — PROGRESS NOTES
[facility] Pharmacist Visit - Diabetes Management  Referring Provider: Juliane Guzman MD    Subjective   Rui Shah is a 68 y.o. male who presents for Diabetes.    HPI  PMH significant for T2DM, HTN, GERD, Depression, asthma, HLD, MARIELA.  Patient has no known history of medullary thyroid cancer, pancreatitis. Previous UTIs.  Diagnosed 6-7 years ago. Known DM complications include peripheral neuropathy.  Special needs/barriers to therapy: None identified    Lifestyle: Has noticed appetite is reduced, eating smaller portions. No recent changes  Diet: 3 meals/day.  BK: small meal  LN: Couple sandwiches  DN: Chicken or hamburgers w/ green beans or broccoli when his wife cooks. Takeout 2-3 times per week  Snacks: Trail mix, fruit  Drinks: Water, occasionally diet soda     Physical Activity: Limited due to ankle surgery, doing PT    Has lost ~15 lbs over past 2 months    Diabetes Management  Current Medications: Metformin XR 500mg 2 tabs qAM and 2 tabs qPM, Trulicity 1.5mg weekly (3 doses completed, Saturdays)  Previous Medications: None    Adherence:  Takes medication as directed, misses/late doses once per week .  Adverse Effects: Vomiting 3-5 times over past week (occurs at various days/times, unrelated to food, denies any other symptoms)    Risk Reducing Meds  On ACEi/ARB: Yes   On SGLT2i: No (Caution - history of UTIs)   On GLP1-RA: Yes   On Statin: Yes     Glucose Monitoring  Glucometer/CGM Type: Onetouch Ultra 2    Previous home BG readings: (9/12/23) -170  Current home BG readings: FBG 98 - 149    Hypoglycemia: Patient denies signs and symptoms and No BG readings < 80 mg/dL  Hyperglycemia: Denies signs and symptoms    Diabetic Eye Exam: Needs completed, last summer 2022  Monofilament Foot Exam: Needs completed, last unknown      Secondary Prevention  ASCVD Risk:   The 10-year ASCVD risk score (Lamin LORENZ, et al., 2019) is: 28.4%    Values used to calculate the score:      Age: 68 years      Sex: Male      Is  "Non- : No      Diabetic: Yes      Tobacco smoker: No      Systolic Blood Pressure: 137 mmHg      Is BP treated: Yes      HDL Cholesterol: 49.1 mg/dL      Total Cholesterol: 145 mg/dL  On Statin?: Yes, rosuvastatin 10mg daily    BP Readings from Last 3 Encounters:   03/14/23 124/80   12/19/22 125/75   09/26/22 (!) 145/109     Current HTN Regimen: losartan 25mg daily, diltiazem ER 240mg daily   HTN at goal? Yes    Immunizations Needed: Prevnar, Shingrix, Tdap, and Hepatitis B Series  Tobacco Use: non-smoker      Objective   Vitals  BP Readings from Last 2 Encounters:   03/14/23 124/80   12/19/22 125/75     BMI Readings from Last 1 Encounters:   03/14/23 32.82 kg/m²      Labs  A1C  Lab Results   Component Value Date    HGBA1C 9.6 (A) 06/26/2023    HGBA1C 6.7 04/02/2019    HGBA1C 8.1 11/27/2018     BMP  Lab Results   Component Value Date    CALCIUM 9.1 06/26/2023     (L) 06/26/2023    K 4.2 06/26/2023    CO2 20 (L) 06/26/2023     06/26/2023    BUN 21 06/26/2023    CREATININE 0.98 06/26/2023    GFRMALE 85 06/26/2023     LFTs  Lab Results   Component Value Date    ALT 42 06/23/2022    AST 48 (H) 06/23/2022    ALKPHOS 72 06/23/2022    BILITOT 0.6 06/23/2022     FLP  Lab Results   Component Value Date    TRIG 134 12/19/2022    CHOL 145 12/19/2022    LDLF 69 12/19/2022    HDL 49.1 12/19/2022     Urine Microalbumin  No results found for: \"MICROALBCREA\"  Vitamin B12  No results found for: \"BHUOETTI18\"      Assessment/Plan   Problem List Items Addressed This Visit       Type 2 diabetes mellitus with diabetic neuropathy, without long-term current use of insulin (CMS/[facility]) - Primary     Diabetes: Uncontrolled with last A1c 9.6% on 6/26/23. Due for updated A1c. Current regimen includes metformin and Trulicity 1.5mg weekly. Patient has been experiencing bouts of nausea and vomiting over past 1-2 weeks, unrelated to food, denies other symptoms. Potentially related to increased dose of Trulicity. " Reviewed signs and symptoms of DKA (not suspected at this time) and when to seek emergency treatment. Home BG readings FBG 98 - 149. Due to nausea and vomiting, plan to reduce dose of Trulicity to 0.75mg weekly to see if symptoms improve. Patient will also discuss with PCP at upcoming appt.  Decrease Trulicity to 0.75mg weekly.  Continue taking Metformin XR 500mg 2 tabs qAM and 2 tabs qPM  Continue to monitor and record BG daily  Refill for test strips sent to local pharmacy.         Relevant Medications    blood sugar diagnostic strip    dulaglutide (Trulicity) 0.75 mg/0.5 mL pen injector    Other Relevant Orders    Follow Up In [facility] - Pharmacy     Patient Education:  Counseled patient on relevant MOA, expectations, side effects, duration of therapy, contraindications, administration, and monitoring parameters.  All questions and concerns addressed. Contact pharmacist with any further questions or concerns prior to next appointment.  Reviewed BG goals: Fasting BG goal , Postprandial BG goal <180 mg/dL, A1C goal <7%    Clinical Pharmacist follow-up: 10/30/23 at 10:30am, Telehealth visit  Next PCP appointment: 10/10/23    Gini Lockhart, PharmD  Clinical Pharmacist  10/09/23    Continue all meds under the continuation of care with the referring provider and clinical pharmacy team.  Verbal consent to manage patient's drug therapy was obtained from the patient. They were informed they may decline to participate or withdraw from participation in pharmacy services at any time.

## 2023-10-09 NOTE — PATIENT INSTRUCTIONS
Thank you for engaging in your appointment with Gini Lockhart, PharmD today! Please review the following instructions related to your care:    Decrease Trulicity to 0.75mg weekly  Continue taking metformin XR 500mg 2 tablets twice daily with meals.  If you have persistent nausea and vomiting accompanied by rapid/difficult breathing, dry skin and mouth, flushed face, fruity smelling breath, and/or muscle aches - Seek emergency treatment.  Prescription for Onetouch Ultra 2 test strips has been sent to [facility] pharmacy.  Check your blood glucose every morning and record.  Your Fasting BG goal is . Your BG goal 2 hours after a meal is <180. Your A1C goal is <7%.  Contact your pharmacist, Shahida, at [phone] with any questions or concerns prior to your next appointment.    Clinical Pharmacist follow-up: 10/30/23 at 10:30am, Telehealth visit  Next PCP appointment: 10/10/23

## 2023-10-09 NOTE — ASSESSMENT & PLAN NOTE
Diabetes: Uncontrolled with last A1c 9.6% on 6/26/23. Due for updated A1c. Current regimen includes metformin and Trulicity 1.5mg weekly. Patient has been experiencing bouts of nausea and vomiting over past 1-2 weeks, unrelated to food, denies other symptoms. Potentially related to increased dose of Trulicity. Reviewed signs and symptoms of DKA (not suspected at this time) and when to seek emergency treatment. Home BG readings FBG 98 - 149. Due to nausea and vomiting, plan to reduce dose of Trulicity to 0.75mg weekly to see if symptoms improve. Patient will also discuss with PCP at upcoming appt.  Decrease Trulicity to 0.75mg weekly.  Continue taking Metformin XR 500mg 2 tabs qAM and 2 tabs qPM  Continue to monitor and record BG daily  Refill for test strips sent to local pharmacy.

## 2023-10-10 ENCOUNTER — OFFICE VISIT (OUTPATIENT)
Dept: PRIMARY CARE | Facility: CLINIC | Age: 68
End: 2023-10-10
Payer: MEDICARE

## 2023-10-10 VITALS
TEMPERATURE: 97.5 F | BODY MASS INDEX: 31.07 KG/M2 | WEIGHT: 234.47 LBS | HEART RATE: 112 BPM | SYSTOLIC BLOOD PRESSURE: 138 MMHG | HEIGHT: 73 IN | OXYGEN SATURATION: 97 % | DIASTOLIC BLOOD PRESSURE: 82 MMHG

## 2023-10-10 DIAGNOSIS — E11.9 CONTROLLED TYPE 2 DIABETES MELLITUS WITHOUT COMPLICATION, WITHOUT LONG-TERM CURRENT USE OF INSULIN (MULTI): Primary | ICD-10-CM

## 2023-10-10 DIAGNOSIS — F32.0 DEPRESSION, MAJOR, SINGLE EPISODE, MILD (CMS-HCC): ICD-10-CM

## 2023-10-10 LAB
ALBUMIN SERPL BCP-MCNC: 4.5 G/DL (ref 3.4–5)
ALP SERPL-CCNC: 135 U/L (ref 33–136)
ALT SERPL W P-5'-P-CCNC: 24 U/L (ref 10–52)
ANION GAP SERPL CALC-SCNC: 17 MMOL/L (ref 10–20)
AST SERPL W P-5'-P-CCNC: 22 U/L (ref 9–39)
BILIRUB SERPL-MCNC: 0.4 MG/DL (ref 0–1.2)
BUN SERPL-MCNC: 13 MG/DL (ref 6–23)
CALCIUM SERPL-MCNC: 9.6 MG/DL (ref 8.6–10.6)
CHLORIDE SERPL-SCNC: 104 MMOL/L (ref 98–107)
CO2 SERPL-SCNC: 22 MMOL/L (ref 21–32)
CREAT SERPL-MCNC: 0.79 MG/DL (ref 0.5–1.3)
ERYTHROCYTE [DISTWIDTH] IN BLOOD BY AUTOMATED COUNT: 13.7 % (ref 11.5–14.5)
EST. AVERAGE GLUCOSE BLD GHB EST-MCNC: 163 MG/DL
GFR SERPL CREATININE-BSD FRML MDRD: >90 ML/MIN/1.73M*2
GLUCOSE SERPL-MCNC: 121 MG/DL (ref 74–99)
HBA1C MFR BLD: 7.3 %
HCT VFR BLD AUTO: 46.7 % (ref 41–52)
HGB BLD-MCNC: 14.9 G/DL (ref 13.5–17.5)
MCH RBC QN AUTO: 28.5 PG (ref 26–34)
MCHC RBC AUTO-ENTMCNC: 31.9 G/DL (ref 32–36)
MCV RBC AUTO: 89 FL (ref 80–100)
NRBC BLD-RTO: 0 /100 WBCS (ref 0–0)
PLATELET # BLD AUTO: 250 X10*3/UL (ref 150–450)
PMV BLD AUTO: 8.9 FL (ref 7.5–11.5)
POTASSIUM SERPL-SCNC: 4 MMOL/L (ref 3.5–5.3)
PROT SERPL-MCNC: 7.2 G/DL (ref 6.4–8.2)
RBC # BLD AUTO: 5.23 X10*6/UL (ref 4.5–5.9)
SODIUM SERPL-SCNC: 139 MMOL/L (ref 136–145)
TSH SERPL-ACNC: 3.15 MIU/L (ref 0.44–3.98)
WBC # BLD AUTO: 5.8 X10*3/UL (ref 4.4–11.3)

## 2023-10-10 PROCEDURE — 1126F AMNT PAIN NOTED NONE PRSNT: CPT | Performed by: FAMILY MEDICINE

## 2023-10-10 PROCEDURE — 1159F MED LIST DOCD IN RCRD: CPT | Performed by: FAMILY MEDICINE

## 2023-10-10 PROCEDURE — 3008F BODY MASS INDEX DOCD: CPT | Performed by: FAMILY MEDICINE

## 2023-10-10 PROCEDURE — 36415 COLL VENOUS BLD VENIPUNCTURE: CPT

## 2023-10-10 PROCEDURE — 4010F ACE/ARB THERAPY RXD/TAKEN: CPT | Performed by: FAMILY MEDICINE

## 2023-10-10 PROCEDURE — 3079F DIAST BP 80-89 MM HG: CPT | Performed by: FAMILY MEDICINE

## 2023-10-10 PROCEDURE — 1036F TOBACCO NON-USER: CPT | Performed by: FAMILY MEDICINE

## 2023-10-10 PROCEDURE — 3075F SYST BP GE 130 - 139MM HG: CPT | Performed by: FAMILY MEDICINE

## 2023-10-10 PROCEDURE — 83036 HEMOGLOBIN GLYCOSYLATED A1C: CPT

## 2023-10-10 PROCEDURE — 99214 OFFICE O/P EST MOD 30 MIN: CPT | Performed by: FAMILY MEDICINE

## 2023-10-10 PROCEDURE — 84443 ASSAY THYROID STIM HORMONE: CPT

## 2023-10-10 PROCEDURE — 3051F HG A1C>EQUAL 7.0%<8.0%: CPT | Performed by: FAMILY MEDICINE

## 2023-10-10 PROCEDURE — 85027 COMPLETE CBC AUTOMATED: CPT

## 2023-10-10 PROCEDURE — 1160F RVW MEDS BY RX/DR IN RCRD: CPT | Performed by: FAMILY MEDICINE

## 2023-10-10 PROCEDURE — 80053 COMPREHEN METABOLIC PANEL: CPT

## 2023-10-10 RX ORDER — ROSUVASTATIN CALCIUM 10 MG/1
10 TABLET, COATED ORAL DAILY
Qty: 90 TABLET | Refills: 3 | Status: SHIPPED | OUTPATIENT
Start: 2023-10-10

## 2023-10-10 RX ORDER — BUPROPION HYDROCHLORIDE 150 MG/1
150 TABLET ORAL EVERY MORNING
Qty: 30 TABLET | Refills: 11 | Status: SHIPPED | OUTPATIENT
Start: 2023-10-10 | End: 2023-11-01

## 2023-10-10 ASSESSMENT — PATIENT HEALTH QUESTIONNAIRE - PHQ9
3. TROUBLE FALLING OR STAYING ASLEEP OR SLEEPING TOO MUCH: NOT AT ALL
9. THOUGHTS THAT YOU WOULD BE BETTER OFF DEAD, OR OF HURTING YOURSELF: MORE THAN HALF THE DAYS
1. LITTLE INTEREST OR PLEASURE IN DOING THINGS: NOT AT ALL
SUM OF ALL RESPONSES TO PHQ QUESTIONS 1-9: 18
1. LITTLE INTEREST OR PLEASURE IN DOING THINGS: NEARLY EVERY DAY
5. POOR APPETITE OR OVEREATING: SEVERAL DAYS
6. FEELING BAD ABOUT YOURSELF - OR THAT YOU ARE A FAILURE OR HAVE LET YOURSELF OR YOUR FAMILY DOWN: NOT AT ALL
SUM OF ALL RESPONSES TO PHQ9 QUESTIONS 1 AND 2: 0
4. FEELING TIRED OR HAVING LITTLE ENERGY: NEARLY EVERY DAY
2. FEELING DOWN, DEPRESSED OR HOPELESS: NEARLY EVERY DAY
10. IF YOU CHECKED OFF ANY PROBLEMS, HOW DIFFICULT HAVE THESE PROBLEMS MADE IT FOR YOU TO DO YOUR WORK, TAKE CARE OF THINGS AT HOME, OR GET ALONG WITH OTHER PEOPLE: EXTREMELY DIFFICULT
8. MOVING OR SPEAKING SO SLOWLY THAT OTHER PEOPLE COULD HAVE NOTICED. OR THE OPPOSITE, BEING SO FIGETY OR RESTLESS THAT YOU HAVE BEEN MOVING AROUND A LOT MORE THAN USUAL: NEARLY EVERY DAY
2. FEELING DOWN, DEPRESSED OR HOPELESS: NOT AT ALL
7. TROUBLE CONCENTRATING ON THINGS, SUCH AS READING THE NEWSPAPER OR WATCHING TELEVISION: NEARLY EVERY DAY
SUM OF ALL RESPONSES TO PHQ9 QUESTIONS 1 AND 2: 6

## 2023-10-10 ASSESSMENT — PAIN SCALES - GENERAL: PAINLEVEL: 0-NO PAIN

## 2023-10-10 NOTE — PATIENT INSTRUCTIONS
Try TRULICITY 0.75mg once weekly for another week, if no improvement then stop the medication    Someone will reach out to you from our behavioral health team    Continue Effexor and add the Wellbutrin to see if it helps      If you have any thoughts of self harm or suicidal plan please call 911 and seek emergent care   165.1

## 2023-10-10 NOTE — PROGRESS NOTES
"Subjective   Patient ID: Rui Shah is a 68 y.o. male who presents for Nausea, Vomiting, and Med Refill (Rosuvastatin, venlafaxine).    HPI   Presents for vomiting on and off for about the last 6-8 weeks, thought maybe medication related, Trulicity.  Tried to change when he took them and it continued.  Thinks it got worse when he increased dose from 0.75mg weekly to 1.5mg.    Feeling overall very overwhelmed and not like himself.  Struggling with mental health.  Has dealt with depression in the past and usually worse in the fall.  Has seen behavioral health and psychology previously and found helpful.  Was doing ok but symptoms have worsened once again.  Feeling hopeless.  Admits to some suicidal thoughts but no ideation, plan or intent.  No history of suicidal plan, ideation or attempt.  Able to endorse family, wife and acknowledges that this is not the solution.  Would like to have some modification to medication and also to see a counselor again as it was helpful previously.    Review of Systems  Negative unless noted in HPI    Objective   /82 (BP Location: Right arm, Patient Position: Sitting, BP Cuff Size: Large adult)   Pulse (!) 112   Temp 36.4 °C (97.5 °F) (Oral)   Ht 1.854 m (6' 1\")   Wt 106 kg (234 lb 7.5 oz)   SpO2 97%   BMI 30.93 kg/m²     Physical Exam  Cardiovascular:      Rate and Rhythm: Normal rate and regular rhythm.   Pulmonary:      Effort: Pulmonary effort is normal.      Breath sounds: Normal breath sounds.   Neurological:      General: No focal deficit present.      Mental Status: He is alert.   Psychiatric:         Attention and Perception: Attention normal.         Mood and Affect: Mood is depressed.         Speech: Speech normal.         Behavior: Behavior normal.         Thought Content: Thought content does not include homicidal or suicidal ideation. Thought content does not include homicidal or suicidal plan.         Cognition and Memory: Cognition normal. "         Assessment/Plan   Problem List Items Addressed This Visit             ICD-10-CM    Controlled type 2 diabetes mellitus without complication, without long-term current use of insulin (CMS/[facility]) - Primary E11.9     Suspect that nausea and vomiting is related to GLP-1  Recommend monitoring on decreased dose but if ongoing symptoms then would recommend discontinuing and will discuss with pharmacy an alternative that is tolerated  Will also check additional labs that may be contributing to symptoms         Relevant Medications    rosuvastatin (Crestor) 10 mg tablet    Other Relevant Orders    Comprehensive Metabolic Panel (Completed)    Hemoglobin A1C (Completed)    CBC (Completed)    Depression, major, single episode, mild (CMS/[facility]) F32.0     Continue Effexor, add Wellbutrin to augment   STAT referral for behavioral health as pt would greatly benefit to start with some counseling, does desire in person session  Discussed if any thoughts of self harm or plan that he should seek emergent care           Relevant Medications    buPROPion XL (Wellbutrin XL) 150 mg 24 hr tablet    venlafaxine XR (Effexor-XR) 75 mg 24 hr capsule    Other Relevant Orders    TSH with reflex to Free T4 if abnormal (Completed)    Follow Up In [facility] - Behavioral Health [facility]

## 2023-10-11 DIAGNOSIS — E11.40 TYPE 2 DIABETES MELLITUS WITH DIABETIC NEUROPATHY, WITHOUT LONG-TERM CURRENT USE OF INSULIN (MULTI): ICD-10-CM

## 2023-10-11 PROBLEM — R09.81 SINUS CONGESTION: Status: RESOLVED | Noted: 2023-02-06 | Resolved: 2023-10-11

## 2023-10-11 PROBLEM — J34.89 NASAL AND SINUS DISCHARGE: Status: RESOLVED | Noted: 2023-02-06 | Resolved: 2023-10-11

## 2023-10-11 PROBLEM — J01.90 ACUTE SINUSITIS, UNSPECIFIED: Status: RESOLVED | Noted: 2023-02-06 | Resolved: 2023-10-11

## 2023-10-11 RX ORDER — VENLAFAXINE HYDROCHLORIDE 75 MG/1
225 CAPSULE, EXTENDED RELEASE ORAL DAILY
Qty: 270 CAPSULE | Refills: 0 | Status: SHIPPED | OUTPATIENT
Start: 2023-10-11 | End: 2024-01-03

## 2023-10-11 RX ORDER — VENLAFAXINE HYDROCHLORIDE 75 MG/1
225 CAPSULE, EXTENDED RELEASE ORAL DAILY
Qty: 270 CAPSULE | Refills: 0 | Status: SHIPPED | OUTPATIENT
Start: 2023-10-11 | End: 2023-10-11 | Stop reason: SDUPTHER

## 2023-10-11 NOTE — ASSESSMENT & PLAN NOTE
Suspect that nausea and vomiting is related to GLP-1  Recommend monitoring on decreased dose but if ongoing symptoms then would recommend discontinuing and will discuss with pharmacy an alternative that is tolerated  Will also check additional labs that may be contributing to symptoms

## 2023-10-11 NOTE — ASSESSMENT & PLAN NOTE
Continue Effexor, add Wellbutrin to augment   STAT referral for behavioral health as pt would greatly benefit to start with some counseling, does desire in person session  Discussed if any thoughts of self harm or plan that he should seek emergent care

## 2023-10-12 ENCOUNTER — DOCUMENTATION (OUTPATIENT)
Dept: BEHAVIORAL HEALTH | Facility: CLINIC | Age: 68
End: 2023-10-12

## 2023-10-12 ENCOUNTER — SOCIAL WORK (OUTPATIENT)
Dept: PRIMARY CARE | Facility: CLINIC | Age: 68
End: 2023-10-12
Payer: MEDICARE

## 2023-10-12 RX ORDER — ESOMEPRAZOLE MAGNESIUM 40 MG/1
40 CAPSULE, DELAYED RELEASE ORAL
Qty: 90 CAPSULE | Refills: 3 | Status: SHIPPED | OUTPATIENT
Start: 2023-10-12 | End: 2024-03-01 | Stop reason: SDUPTHER

## 2023-10-12 ASSESSMENT — PATIENT HEALTH QUESTIONNAIRE - PHQ9
SUM OF ALL RESPONSES TO PHQ9 QUESTIONS 1 & 2: 3
9. THOUGHTS THAT YOU WOULD BE BETTER OFF DEAD, OR OF HURTING YOURSELF: SEVERAL DAYS
3. TROUBLE FALLING OR STAYING ASLEEP: MORE THAN HALF THE DAYS
10. IF YOU CHECKED OFF ANY PROBLEMS, HOW DIFFICULT HAVE THESE PROBLEMS MADE IT FOR YOU TO DO YOUR WORK, TAKE CARE OF THINGS AT HOME, OR GET ALONG WITH OTHER PEOPLE: SOMEWHAT DIFFICULT
6. FEELING BAD ABOUT YOURSELF - OR THAT YOU ARE A FAILURE OR HAVE LET YOURSELF OR YOUR FAMILY DOWN: SEVERAL DAYS
5. POOR APPETITE OR OVEREATING: NOT AT ALL
4. FEELING TIRED OR HAVING LITTLE ENERGY: MORE THAN HALF THE DAYS
8. MOVING OR SPEAKING SO SLOWLY THAT OTHER PEOPLE COULD HAVE NOTICED. OR THE OPPOSITE, BEING SO FIGETY OR RESTLESS THAT YOU HAVE BEEN MOVING AROUND A LOT MORE THAN USUAL: NOT AT ALL
SUM OF ALL RESPONSES TO PHQ QUESTIONS 1-9: 10
7. TROUBLE CONCENTRATING ON THINGS, SUCH AS READING THE NEWSPAPER OR WATCHING TELEVISION: SEVERAL DAYS
2. FEELING DOWN, DEPRESSED OR HOPELESS: MORE THAN HALF THE DAYS
1. LITTLE INTEREST OR PLEASURE IN DOING THINGS: SEVERAL DAYS

## 2023-10-12 ASSESSMENT — ANXIETY QUESTIONNAIRES
GAD7 TOTAL SCORE: 15
7. FEELING AFRAID AS IF SOMETHING AWFUL MIGHT HAPPEN: NOT AT ALL
6. BECOMING EASILY ANNOYED OR IRRITABLE: MORE THAN HALF THE DAYS
5. BEING SO RESTLESS THAT IT IS HARD TO SIT STILL: NEARLY EVERY DAY
IF YOU CHECKED OFF ANY PROBLEMS ON THIS QUESTIONNAIRE, HOW DIFFICULT HAVE THESE PROBLEMS MADE IT FOR YOU TO DO YOUR WORK, TAKE CARE OF THINGS AT HOME, OR GET ALONG WITH OTHER PEOPLE: SOMEWHAT DIFFICULT
3. WORRYING TOO MUCH ABOUT DIFFERENT THINGS: MORE THAN HALF THE DAYS
4. TROUBLE RELAXING: NEARLY EVERY DAY
2. NOT BEING ABLE TO STOP OR CONTROL WORRYING: MORE THAN HALF THE DAYS
1. FEELING NERVOUS, ANXIOUS, OR ON EDGE: NEARLY EVERY DAY

## 2023-10-12 NOTE — PROGRESS NOTES
[facility] Psychiatry Consult Note     Rui Shah is a 68 y.o., referred to [facility] Care for symptoms of depression and anxiety. I have reviewed the patient with the behavioral health manager and reviewed the patient's electronic record.    Recommendations:   - agree with Wellbutrin augmentation and titration to effect  - [facility] will conduct behavioral activation therapy        Seen by another [facility] care psychiatrist a year ago, recommended continuing venlafaxine 225 daily and considering augmentation with amitriptyline, aripiprazole, or bupropion.  Now noticing low motivation, attributed to physical limitations. Anxiety centers around things he should be doing much isn't due to avoidance.  Retired early due to many ankle surgeries. DM2 now under good control.  Family history of alcohol and other substance use disorders (but no personal history).  Uses marijuana gummies nightly for ankle pain.    Diagnosis:  Persistent depressive disorder  Unspecified anxiety: either resulting from depression, or more generalized    Current meds:  Venlafaxine  mg daily  Wellbutrin  mg started yesterday    Patient Health Questionnaire-9 Score: 10 (10/12/2023 11:00 AM)  SILVER-7 Total Score: 15 (10/12/2023 11:00 AM)    Wellbutrin  ========  DOSING INFORMATION:   Wellbutrin-XL:   Week 1: Baseline blood pressure. Start XL: 150 mg qAM.   Week 2: Increase to 300 mg qAM, if tolerated.   Typical Dosage Range: 300-450 mg/day.  Max Dose: 400-450 mg qday.  Discontinuation: 25% per week to 25% per month depending on length of treatment in order to minimize risk of relapse. (150 mg per week is common practice).  MONITORING:   Blood pressure. Reports of false-positive urine immunoassay screening tests for amphetamines have been reported in patients taking bupropion, consult lab if needed.  GENERAL INFORMATION:   Wellbutrin has a novel mechanism of action (weak dopamine and NE reuptake inhibitor; stimulant like effect).   FDA  Indications: Major depressive disorder, season affective disorder (prophylaxis), and smoking cessation.   Off-Label Indications: Second line RX for ADHD.   Pharmacokinetics: T½ = 21 hr.   Common Side effects (XL-MDD): Headache (34%), dry mouth (26%), >5 lb. weight loss (23%), insomnia (20%), nausea (13%), constipation (9%), anxiety (7%), flatulence (6%).   Black Box Warning: Increased SI in patients < 24 y/o. Increased risk of neuropsychiatric symptoms and suicidality in patients taking bupropion for smoking cessation.   Contraindications: Seizure disorder, current or prior diagnosis of bulimia or anorexia nervosa, abrupt discontinuation of alcohol or benzodiazepines, use of a MAOI within 14 days of stopping Wellbutrin, concurrent use of a MAOI including drugs with significant MAOI activity (e.g., linezolid), or use of Wellbutrin within 14 days of stopping a MAOI.   Warnings and Precautions: Clinical worsening and suicide risk, increased risk of seizures, use in patients with a history of traumatic brain injury, potential for hepatotoxicity and hepatic impairment, increased agitation and insomnia, hypertension, decreased appetite and weight, activation of psychosis, potential for renal impairment.      The above treatment considerations and suggestions are based on consultations with the patient's care manager and a review of information available in the electronic medical record. I have not personally examined the patient. All recommendations should be implemented with consideration of the patient's relevant prior history and current clinical status. Please feel free to call me with any questions about the care of this patient. I can easily be reached through [email or web address] chat.
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
## 2023-10-18 ENCOUNTER — SOCIAL WORK (OUTPATIENT)
Dept: PRIMARY CARE | Facility: CLINIC | Age: 68
End: 2023-10-18
Payer: MEDICARE

## 2023-10-18 DIAGNOSIS — F34.1 PERSISTENT DEPRESSIVE DISORDER: Primary | ICD-10-CM

## 2023-10-18 PROCEDURE — 99492 1ST PSYC COLLAB CARE MGMT: CPT | Performed by: FAMILY MEDICINE

## 2023-10-18 NOTE — PROGRESS NOTES
[facility]  Progress Note    Type of Interaction: In Office    Start Time: 9:45a    End Time: 10:45a        Appointment: Scheduled    Reason for Visit:  anxiety      Interval History / Patient Symptoms: Patient reports his wife has noticed a change in his mood.  Patient reports a change in his outlook. Patient feels guilty about not going to help his friend with physical labor. Patient reports he will worry about the event the night before. Patient feels frustrated by having some limitations.  Patient and BHM dicussed he has a bit of doubt when going to help his friend. Patient reports he is being somewhat more confused. Patient reports he forgot words when talking with someone, he reports is has been going on for several months. Patient reports he has a hard time expressing how he feels. Patient reports he often procrastinates. Patient reports he does better with structure. Patient has been dx in the past with ADD.  Patient reports this was years ago. Patient reports he is aggravated at the quantity of meds he has to take daily. He takes 9 pills daily. Patient and BHM discussed the cycle of anxiety and what doubt means for him. Patient reports a lack of motivation to get an activity started. Patient and BHM discussed behavioral activation, he does go out with his friends weekly. Patient does not like change. Patient and BHM discussed adapting his behaviors so that he can engage with his friends.     Patient Health Questionnaire-9 Score: 10 (10/12/2023 11:00 AM)  SILVER-7 Total Score: 15 (10/12/2023 11:00 AM)        Interventions Provided: Behavioral Activation, Develop Coping Strategies, Review Progress/Goals Stress Management, and Mindfulness      Progress Made: Mixed    Response to Intervention: Patient is easy to engage and reflective. Patient recognizes without a schedule he is doing the same daily limited activities so he remains in bed longer to avoid the mundane routine. Patient willing to engage  in breathing techniques as well as revisiting the CALM jany he has on his phone.            Follow Up / Next Appointment:    11/1/2023 10a

## 2023-10-19 ENCOUNTER — TREATMENT (OUTPATIENT)
Dept: PHYSICAL THERAPY | Facility: CLINIC | Age: 68
End: 2023-10-19
Payer: MEDICARE

## 2023-10-19 DIAGNOSIS — M25.571 RIGHT ANKLE PAIN: Primary | ICD-10-CM

## 2023-10-19 PROCEDURE — 97110 THERAPEUTIC EXERCISES: CPT | Mod: GP

## 2023-10-19 NOTE — PROGRESS NOTES
Physical Therapy  Physical Therapy Discharge    Patient Name: Rui Shah  MRN: [id number]  Today's Date: 10/19/2023  Referred By: [employer]  Insurance: [employer]; $0 co-pay  Visit Limit: MN  Visit: 8   Time Calculation  Start Time: 0835  Stop Time: 0850  Time Calculation (min): 15 min  Discharge Summary: PT    Discharge Status: Independent with HEP; continuing to have pain, stiffness of ankle     Rehab Discharge Reason: Progress plateaued; further improvement possible     Goals:  In 2 weeks, pt leah rate pain 0-2 on the numeric pain scale to allow for restful nights of sleep. (Not met, 90% met)  In 2 weeks, pt's ankle AROM will be +5 degrees grossly. (Not met, 75% met)  In 4 weeks, pt's lower extremity strength will grossly be one MMT classification improved (not met, 75% met)  In 4 weeks, pt will be able to walk for 30 minutes without boot without an increase in pain. (100% met)  In 6 weeks, pt will be independent with HEP. (100% met)  In 6 weeks, pt's LEFS will be +8. (100% met)    Assessment: Upon re-examination, pt displayed improvements in functional ability and balance, however, pt continues to have pain, stiffness that limit functional and recreational ability. Pt has met 3/6 goals and is to be discharged home with HEP. Independent HEP will aim to continue to improve ankle range of motion, strength, balance. Educated pt to contact office if he has any future questions/concerns.       Plan: Discharge home with HEP.       Current Problem  1. Right ankle pain            Pain: 2/10       Subjective:   Subjective   Patient reports that he has been doing okay over the last several weeks but continues to have pain. Mentions that he feels like his ankle has reached a plateau and is interested in today being the last session unless MD says otherwise during follow up.    Objective:   Observation: R ankle swelling    Gait: antalgic gait pattern    4 Stage Balance Test  Close stance: 30 sec  Semi-Tandem: 30 sec B  Tandem  R: 30  sec   L: 30 sec  Single Leg  R: 5 sec  L: 2 sec    Ankle AROM  DF  R: -2 deg  L: 0 deg  PF  R: 20 deg, P!  L: 11 deg   Inversion  R: 30 deg  L: 30 deg  Eversion  R: 15 deg  L: 15 deg     LE MMT   Ankle DF  R: 4  L: 4  Ankle PF  R: 4  L: 4  Ankle Inversion  R: 4  L: 4+  Ankle Eversion  R: 4  L: 4+    Treatments:    Exercise Sets/Reps   Nu step 7 minutes   Objective measures    Education on rehab goals, prognosis        Chad De Jesus, PT

## 2023-10-24 ENCOUNTER — APPOINTMENT (OUTPATIENT)
Dept: PHYSICAL THERAPY | Facility: CLINIC | Age: 68
End: 2023-10-24
Payer: MEDICARE

## 2023-10-30 ENCOUNTER — TELEMEDICINE (OUTPATIENT)
Dept: PHARMACY | Facility: HOSPITAL | Age: 68
End: 2023-10-30
Payer: MEDICARE

## 2023-10-30 DIAGNOSIS — E11.40 TYPE 2 DIABETES MELLITUS WITH DIABETIC NEUROPATHY, WITHOUT LONG-TERM CURRENT USE OF INSULIN (MULTI): ICD-10-CM

## 2023-10-30 NOTE — PROGRESS NOTES
[facility] Pharmacist Visit - Diabetes Management  Referring Provider: Juliane Guzman MD    Subjective   Rui Shah is a 68 y.o. male who presents for Diabetes.    HPI  PMH significant for T2DM, HTN, GERD, Depression, asthma, HLD, MARIELA.  Patient has no known history of medullary thyroid cancer, pancreatitis. Previous UTIs.  Diagnosed 6-7 years ago. Known DM complications include peripheral neuropathy.  Special needs/barriers to therapy: None identified    Vomiting Friday and this morning. Some dizziness, weakness, shakiness.     Lifestyle: No recent changes. He continues to have period nausea and vomiting, most recently on Friday after eating lunch and this morning unrelated to food. Has some mild dizziness, weakness, and shakiness.  Diet: 3 meals/day.  BK: small meal  LN: Couple sandwiches  DN: Chicken or hamburgers w/ green beans or broccoli when his wife cooks. Takeout 2-3 times per week  Snacks: Trail mix, fruit  Drinks: Water, occasionally diet soda     Physical Activity: Limited due to ankle surgery, doing PT    Diabetes Management  Current Medications: Metformin XR 500mg 2 tabs qAM and 2 tabs qPM, Trulicity 0.75mg weekly (Saturdays)  Previous Medications: None    Adherence:  Takes medication as directed, misses/late doses once per week .  Adverse Effects: Continues to have nausea/vomiting several times a week (occurs at various days/times, unrelated to food, denies any other symptoms)    Risk Reducing Meds  On ACEi/ARB: Yes   On SGLT2i: No (Caution - history of UTIs)   On GLP1-RA: Yes   On Statin: Yes     Glucose Monitoring  Glucometer/CGM Type: Onetouch Ultra 2    Previous home BG readings: (10/9/23) FBG 98 - 149  Current home BG readings:  - 145    Hypoglycemia: Patient denies signs and symptoms and No BG readings < 80 mg/dL  Hyperglycemia: Denies signs and symptoms    Diabetic Eye Exam: Needs completed, last summer 2022  Monofilament Foot Exam: Needs completed, last unknown      Secondary Prevention  ASCVD  "Risk:   The 10-year ASCVD risk score (Lamin LORENZ, et al., 2019) is: 28.7%    Values used to calculate the score:      Age: 68 years      Sex: Male      Is Non- : No      Diabetic: Yes      Tobacco smoker: No      Systolic Blood Pressure: 138 mmHg      Is BP treated: Yes      HDL Cholesterol: 49.1 mg/dL      Total Cholesterol: 145 mg/dL  On Statin?: Yes, rosuvastatin 10mg daily    BP Readings from Last 3 Encounters:   10/10/23 138/82   03/14/23 124/80   12/19/22 125/75     Current HTN Regimen: losartan 25mg daily, diltiazem ER 240mg daily   HTN at goal? Yes    Immunizations Needed: Prevnar, Shingrix, Tdap, and Hepatitis B Series  Tobacco Use: non-smoker      Objective   Vitals  BP Readings from Last 2 Encounters:   10/10/23 138/82   03/14/23 124/80     BMI Readings from Last 1 Encounters:   10/10/23 30.93 kg/m²     Labs  A1C  Lab Results   Component Value Date    HGBA1C 7.3 (H) 10/10/2023    HGBA1C 9.6 (A) 06/26/2023    HGBA1C 6.7 04/02/2019     BMP  Lab Results   Component Value Date    CALCIUM 9.6 10/10/2023     10/10/2023    K 4.0 10/10/2023    CO2 22 10/10/2023     10/10/2023    BUN 13 10/10/2023    CREATININE 0.79 10/10/2023    GFRMALE 85 06/26/2023     LFTs  Lab Results   Component Value Date    ALT 24 10/10/2023    AST 22 10/10/2023    ALKPHOS 135 10/10/2023    BILITOT 0.4 10/10/2023     FLP  Lab Results   Component Value Date    TRIG 134 12/19/2022    CHOL 145 12/19/2022    LDLF 69 12/19/2022    HDL 49.1 12/19/2022     Urine Microalbumin  No results found for: \"MICROALBCREA\"  Vitamin B12  No results found for: \"[id number]\"      Assessment/Plan   Problem List Items Addressed This Visit       Type 2 diabetes mellitus with diabetic neuropathy, without long-term current use of insulin (CMS/[facility])     Diabetes: Uncontrolled with last A1c 7.3% on 10/10/23, but significantly improved from previous. Current regimen includes Metformin XR 500mg 2 tabs twice daily and Trulicity 0.75mg " weekly. Patient continues to have period nausea and vomiting with no identified cause. Has not improved with reduced dose of Trulicity. Reviewed SxS of DKA, not suspected at this time. Home BG readings -145. Patient reports unable to get test strip refill from pharmcy. Due to A1c improved and concern for exacerbating nausea/vomiting, plan to discontinue Trulicity. Ptient has upcoming appt with PCP next week and will discuss N/V issue. If not resolved by that time, likely not caused by Trulicity. Will assess BG in 2 weeks and add another agent if needed.  Stop taking Trulicity 0.75mg.  Continue taking Metformin XR 500mg 2 tabs twice daily  Continue to monitor and record BG daily  Pharmacist contacted [facility] to resolve issue filling test strips. Confirmed Rx will be filled today.    Future considerations: Consider SGLT2i, but with high caution due to Hx of UTI and hospitalization for dehydration. May also consider sulfonylurea.         Relevant Orders    Follow Up In Advanced [facility] - Pharmacy     Patient Education:  Counseled patient on relevant MOA, expectations, side effects, duration of therapy, contraindications, administration, and monitoring parameters.  All questions and concerns addressed. Contact pharmacist with any further questions or concerns prior to next appointment.  Reviewed BG goals: Fasting BG goal , Postprandial BG goal <180 mg/dL, A1C goal <7%    Clinical Pharmacist follow-up: 11/13/23 at 10:30am, Telehealth visit  Next PCP appointment: 11/7/23    Gini Lockhart, PharmD  Clinical Pharmacist  10/30/23    Continue all meds under the continuation of care with the referring provider and clinical pharmacy team.  Verbal consent to manage patient's drug therapy was obtained from the patient. They were informed they may decline to participate or withdraw from participation in pharmacy services at any time.

## 2023-10-30 NOTE — PATIENT INSTRUCTIONS
Thank you for engaging in your appointment with Gini Lockhart, Lionel today! Please review the following instructions related to your care:    Stop taking Trulicity.  Continue taking Metformin XR 500mg 2 tabs twice daily.  Check your blood glucose every morning and record. Pharmacist has contacted your [facility] pharmacy to have test strips refilled.  Your Fasting BG goal is . Your BG goal 2 hours after a meal is <180. Your A1C goal is <7%.  Contact your pharmacist, Shahida, at [phone] with any questions or concerns prior to your next appointment.    Clinical Pharmacist follow-up: 11/13/23 at 10:30am, Telehealth visit  Next PCP appointment: 11/7/23

## 2023-10-30 NOTE — ASSESSMENT & PLAN NOTE
Diabetes: Uncontrolled with last A1c 7.3% on 10/10/23, but significantly improved from previous. Current regimen includes Metformin XR 500mg 2 tabs twice daily and Trulicity 0.75mg weekly. Patient continues to have period nausea and vomiting with no identified cause. Has not improved with reduced dose of Trulicity. Reviewed SxS of DKA, not suspected at this time. Home BG readings -145. Patient reports unable to get test strip refill from pharmcy. Due to A1c improved and concern for exacerbating nausea/vomiting, plan to discontinue Trulicity. Ptient has upcoming appt with PCP next week and will discuss N/V issue. If not resolved by that time, likely not caused by Trulicity. Will assess BG in 2 weeks and add another agent if needed.  Stop taking Trulicity 0.75mg.  Continue taking Metformin XR 500mg 2 tabs twice daily  Continue to monitor and record BG daily  Pharmacist contacted [facility] to resolve issue filling test strips. Confirmed Rx will be filled today.    Future considerations: Consider SGLT2i, but with high caution due to Hx of UTI and hospitalization for dehydration. May also consider sulfonylurea.

## 2023-10-31 ENCOUNTER — APPOINTMENT (OUTPATIENT)
Dept: PHYSICAL THERAPY | Facility: CLINIC | Age: 68
End: 2023-10-31
Payer: MEDICARE

## 2023-11-01 ENCOUNTER — DOCUMENTATION (OUTPATIENT)
Dept: PRIMARY CARE | Facility: CLINIC | Age: 68
End: 2023-11-01

## 2023-11-01 ENCOUNTER — SOCIAL WORK (OUTPATIENT)
Dept: PRIMARY CARE | Facility: CLINIC | Age: 68
End: 2023-11-01
Payer: MEDICARE

## 2023-11-01 ENCOUNTER — APPOINTMENT (OUTPATIENT)
Dept: PRIMARY CARE | Facility: CLINIC | Age: 68
End: 2023-11-01
Payer: MEDICARE

## 2023-11-01 DIAGNOSIS — F34.1 PERSISTENT DEPRESSIVE DISORDER: Primary | ICD-10-CM

## 2023-11-01 DIAGNOSIS — F32.0 DEPRESSION, MAJOR, SINGLE EPISODE, MILD (CMS-HCC): ICD-10-CM

## 2023-11-01 PROCEDURE — 99494 1ST/SBSQ PSYC COLLAB CARE: CPT | Performed by: FAMILY MEDICINE

## 2023-11-01 PROCEDURE — 99492 1ST PSYC COLLAB CARE MGMT: CPT | Performed by: FAMILY MEDICINE

## 2023-11-01 RX ORDER — BUPROPION HYDROCHLORIDE 150 MG/1
150 TABLET ORAL EVERY MORNING
Qty: 90 TABLET | Refills: 4 | Status: SHIPPED | OUTPATIENT
Start: 2023-11-01 | End: 2024-10-31

## 2023-11-01 NOTE — PROGRESS NOTES
[facility]  [facility]    Type of Interaction: In Office    Start Time: 9:55a    End Time: 10:55a        Appointment: Scheduled    Reason for Visit: No chief complaint on file.   Persistent depressive disorder      Interval History / Patient Symptoms:   Patient reports he has been having episodes of throwing up. Patient reports he is very tired. Patient and BHM spoke about adapting his activities so that he can still be occupied. Patient and BHM explored what is of interest to the patient and he is not certain. Patient is struggling with having limited mobility and this is depressing to him as well as frustrating.  Patient recognizes he needs a daily routine as he is used to having one each day from when he was working. Patient states he does not look forward to the day as it is long and he does not have much to occupy his time because of his limited mobility. Patient and BHM processed acceptance of his current situation and looking at what he can do instead of what he can not do. Patient realizes that he becomes depressed which causes anxiety and then he just shuts down. Patient and BHM discussed CBT and learning to change his thoughts from negatives to positives. . Patient discussed his physical symptoms of vomiting and that he is not certain why. BHM and patient discussed the cycle of anxiety as well as the symptoms that occur, upset stomach is one of the symptoms. Patient would like to know the reason why he feels the way that he is feeling. Patient wants to enjoy halfway and was able to voice the positives about being retired.     No data recorded      Interventions Provided: Behavioral Activation, Acceptance & Commitment Therapy, Develop Coping Strategies, Review Progress/Goals Stress Management, and Mindfulness      Progress Made: Mixed    Response to Intervention: Patient is easy to engage and reflective. Patient reflects and processes with BHM. Patient is open to working on small steps  to reach his desired outcome.                  Follow Up / Next Appointment:      11/15/2023 10a

## 2023-11-07 ENCOUNTER — OFFICE VISIT (OUTPATIENT)
Dept: PRIMARY CARE | Facility: CLINIC | Age: 68
End: 2023-11-07
Payer: MEDICARE

## 2023-11-07 ENCOUNTER — ANCILLARY PROCEDURE (OUTPATIENT)
Dept: RADIOLOGY | Facility: CLINIC | Age: 68
End: 2023-11-07
Payer: MEDICARE

## 2023-11-07 VITALS
DIASTOLIC BLOOD PRESSURE: 78 MMHG | TEMPERATURE: 97.2 F | WEIGHT: 232 LBS | SYSTOLIC BLOOD PRESSURE: 122 MMHG | OXYGEN SATURATION: 97 % | HEIGHT: 72 IN | BODY MASS INDEX: 31.42 KG/M2 | HEART RATE: 105 BPM

## 2023-11-07 DIAGNOSIS — R10.84 GENERALIZED ABDOMINAL PAIN: ICD-10-CM

## 2023-11-07 DIAGNOSIS — F32.0 DEPRESSION, MAJOR, SINGLE EPISODE, MILD (CMS-HCC): ICD-10-CM

## 2023-11-07 DIAGNOSIS — R11.15 PERSISTENT VOMITING: ICD-10-CM

## 2023-11-07 DIAGNOSIS — R10.84 GENERALIZED ABDOMINAL PAIN: Primary | ICD-10-CM

## 2023-11-07 DIAGNOSIS — R11.15 PERSISTENT VOMITING: Primary | ICD-10-CM

## 2023-11-07 PROCEDURE — 2550000001 HC RX 255 CONTRASTS: Performed by: FAMILY MEDICINE

## 2023-11-07 PROCEDURE — 1159F MED LIST DOCD IN RCRD: CPT | Performed by: FAMILY MEDICINE

## 2023-11-07 PROCEDURE — 3078F DIAST BP <80 MM HG: CPT | Performed by: FAMILY MEDICINE

## 2023-11-07 PROCEDURE — 3008F BODY MASS INDEX DOCD: CPT | Performed by: FAMILY MEDICINE

## 2023-11-07 PROCEDURE — 4010F ACE/ARB THERAPY RXD/TAKEN: CPT | Performed by: FAMILY MEDICINE

## 2023-11-07 PROCEDURE — 74177 CT ABD & PELVIS W/CONTRAST: CPT

## 2023-11-07 PROCEDURE — 3074F SYST BP LT 130 MM HG: CPT | Performed by: FAMILY MEDICINE

## 2023-11-07 PROCEDURE — 1036F TOBACCO NON-USER: CPT | Performed by: FAMILY MEDICINE

## 2023-11-07 PROCEDURE — 99214 OFFICE O/P EST MOD 30 MIN: CPT | Performed by: FAMILY MEDICINE

## 2023-11-07 PROCEDURE — 3051F HG A1C>EQUAL 7.0%<8.0%: CPT | Performed by: FAMILY MEDICINE

## 2023-11-07 PROCEDURE — 1170F FXNL STATUS ASSESSED: CPT | Performed by: FAMILY MEDICINE

## 2023-11-07 PROCEDURE — 1125F AMNT PAIN NOTED PAIN PRSNT: CPT | Performed by: FAMILY MEDICINE

## 2023-11-07 PROCEDURE — 1160F RVW MEDS BY RX/DR IN RCRD: CPT | Performed by: FAMILY MEDICINE

## 2023-11-07 PROCEDURE — 74177 CT ABD & PELVIS W/CONTRAST: CPT | Mod: FOREIGN READ | Performed by: RADIOLOGY

## 2023-11-07 RX ORDER — ONDANSETRON 4 MG/1
4 TABLET, FILM COATED ORAL EVERY 8 HOURS PRN
Qty: 30 TABLET | Refills: 0 | Status: SHIPPED | OUTPATIENT
Start: 2023-11-07

## 2023-11-07 RX ADMIN — IOHEXOL 75 ML: 350 INJECTION, SOLUTION INTRAVENOUS at 15:05

## 2023-11-07 ASSESSMENT — ACTIVITIES OF DAILY LIVING (ADL)
MANAGING_FINANCES: INDEPENDENT
TAKING_MEDICATION: INDEPENDENT
GROCERY_SHOPPING: INDEPENDENT
DOING_HOUSEWORK: INDEPENDENT
DRESSING: INDEPENDENT
BATHING: INDEPENDENT

## 2023-11-07 ASSESSMENT — PATIENT HEALTH QUESTIONNAIRE - PHQ9
SUM OF ALL RESPONSES TO PHQ9 QUESTIONS 1 AND 2: 2
2. FEELING DOWN, DEPRESSED OR HOPELESS: SEVERAL DAYS
1. LITTLE INTEREST OR PLEASURE IN DOING THINGS: SEVERAL DAYS
10. IF YOU CHECKED OFF ANY PROBLEMS, HOW DIFFICULT HAVE THESE PROBLEMS MADE IT FOR YOU TO DO YOUR WORK, TAKE CARE OF THINGS AT HOME, OR GET ALONG WITH OTHER PEOPLE: SOMEWHAT DIFFICULT

## 2023-11-07 ASSESSMENT — PAIN SCALES - GENERAL: PAINLEVEL: 4

## 2023-11-07 ASSESSMENT — ENCOUNTER SYMPTOMS
LOSS OF SENSATION IN FEET: 1
OCCASIONAL FEELINGS OF UNSTEADINESS: 1
DEPRESSION: 1

## 2023-11-07 NOTE — PROGRESS NOTES
Subjective   Patient ID: Rui Shah is a 68 y.o. male who presents for Welcome To Medicare, Nausea, and Immunizations.    HPI   Pt presents for ongoing nausea.  Thought secondary to GLP-1 but stopped almost 2 weeks ago and no improvement in symptoms.  Has some days that are good and then will have significant symptoms right from the morning.  Vomits throughout the day.  Has tried fasting, eating but nothing makes a difference.  Has generalized epigastric pain and feeling weak unwell.  Reports that bowel movements are pellet like but that has been for a long time.  Denies any blood in stool or emesis.    Feels that depression is the same.  No improvement with addition of therapy and Wellbutrin.  Nausea/vomiting is big factor that makes it difficult to improve.  Review of Systems  Negative unless noted in HPI    Objective   /78 (BP Location: Left arm, Patient Position: Sitting, BP Cuff Size: Large adult)   Pulse 105   Temp 36.2 °C (97.2 °F) (Oral)   Ht 1.829 m (6')   Wt 105 kg (232 lb)   SpO2 97%   BMI 31.46 kg/m²     Physical Exam  Constitutional:       Appearance: He is ill-appearing.   Cardiovascular:      Rate and Rhythm: Normal rate and regular rhythm.   Pulmonary:      Effort: Pulmonary effort is normal.   Abdominal:      General: There is no distension.      Tenderness: There is abdominal tenderness (mild diffuse tenderness). There is no guarding or rebound.   Skin:     General: Skin is warm.         Assessment/Plan   Problem List Items Addressed This Visit             ICD-10-CM    Abdominal pain - Primary R10.9    Relevant Orders    CT abdomen pelvis w IV contrast (Completed)    Depression, major, single episode, mild (CMS/HCC) F32.0     Continue current medication regimen and follow up with psychiatry  I feel addressing the nausea and vomiting is necessary to get progress with mental health as it is a strong additional stressors for pt  If any thoughts of self harm or harm to others please seek  emergent care         Persistent vomiting R11.15     Ongoing symptoms with active vomiting during exam  Will send Zofran to use  Labs done last visit and were unremarkable for same symptoms  Will get a CT abdomen/pelvis to rule out other causes of recurrent vomiting and generalized pain going on for several months  If no obvious cause will refer to GI for further evaluation  Discussed red flag symptoms which would warrant ER evaluation and PVU         Relevant Medications    ondansetron (Zofran) 4 mg tablet    Other Relevant Orders    CT abdomen pelvis w IV contrast (Completed)

## 2023-11-07 NOTE — ASSESSMENT & PLAN NOTE
Ongoing symptoms with active vomiting during exam  Will send Zofran to use  Labs done last visit and were unremarkable for same symptoms  Will get a CT abdomen/pelvis to rule out other causes of recurrent vomiting and generalized pain going on for several months  If no obvious cause will refer to GI for further evaluation  Discussed red flag symptoms which would warrant ER evaluation and PVU

## 2023-11-07 NOTE — ASSESSMENT & PLAN NOTE
Continue current medication regimen and follow up with psychiatry  I feel addressing the nausea and vomiting is necessary to get progress with mental health as it is a strong additional stressors for pt  If any thoughts of self harm or harm to others please seek emergent care

## 2023-11-09 DIAGNOSIS — R11.15 PERSISTENT VOMITING: Primary | ICD-10-CM

## 2023-11-10 NOTE — PROGRESS NOTES
"[facility] Pharmacist Visit - Diabetes Management  Referring Provider: Juliane Guzman MD    Subjective   Rui Shah is a 68 y.o. male who presents for Diabetes. Vomiting has completley resolved since last week. Patient had a CT of the abdomen/pelvis done 11/10 which patient states came back fine. Patient states having an episode last Friday of feeling faint and \"foggy\" while doing some work but did not lose consciousness.     HPI  PMH significant for T2DM, HTN, GERD, Depression, asthma, HLD, MARIELA.  Patient has no known history of medullary thyroid cancer, pancreatitis. Previous UTIs.  Diagnosed 6-7 years ago. Known DM complications include peripheral neuropathy.  Special needs/barriers to therapy: none    Lifestyle: No recent changes.   Diet: 3 meals/day.  BK: small meal  LN: Couple sandwiches  DN: Chicken or hamburgers w/ green beans or broccoli when his wife cooks. Takeout 2-3 times per week  Snacks: Trail mix, fruit  Drinks: Water, occasionally diet soda     Physical Activity: Limited due to ankle surgery, doing PT    Diabetes Management  Current Medications: Metformin XR 500mg 2 tabs in the morning and 2 tabs in the evening  Previous Medications: Trulicity (vomiting)     Adherence: Takes medication as directed, but does miss doses about once a weekly  Adverse Effects: none    Risk Reducing Meds  On ACEi/ARB: Yes   On SGLT2i: No (caution due to UTI)   On GLP1-RA: No (vomiting)  On Statin: Yes     Glucose Monitoring  Glucometer/CGM Type: One Touch Ultra 2    Previous home BG readings:   - 145   Current home BG readings: - 149      Hypoglycemia: Dizziness and Weakness during Fridays episode. Not usual; states it has happened previously before but usually infrequent  Hyperglycemia: Denies signs and symptoms    Diabetic Eye Exam: Needs completed, last summer 2022  Monofilament Foot Exam: Needs completed, last unknown      Secondary Prevention  ASCVD Risk:   The 10-year ASCVD risk score (Lamin LORENZ, et al., 2019) " "is: 23.7%    Values used to calculate the score:      Age: 68 years      Sex: Male      Is Non- : No      Diabetic: Yes      Tobacco smoker: No      Systolic Blood Pressure: 122 mmHg      Is BP treated: Yes      HDL Cholesterol: 49.1 mg/dL      Total Cholesterol: 145 mg/dL  On Statin?: Yes, rosuvastatin 10 mg daily    BP Readings from Last 3 Encounters:   11/07/23 122/78   10/10/23 138/82   03/14/23 124/80     Current HTN Regimen: losartan 25mg daily, diltiazem ER 240mg daily    HTN at goal? Yes    Immunizations Needed: Prevnar, Shingrix, Tdap, and Hepatitis B Series  Tobacco Use: non-smoker      Objective   Vitals  BP Readings from Last 2 Encounters:   11/07/23 122/78   10/10/23 138/82     BMI Readings from Last 1 Encounters:   11/07/23 31.46 kg/m²      Labs  A1C  Lab Results   Component Value Date    HGBA1C 7.3 (H) 10/10/2023    HGBA1C 9.6 (A) 06/26/2023    HGBA1C 6.7 04/02/2019     BMP  Lab Results   Component Value Date    CALCIUM 9.6 10/10/2023     10/10/2023    K 4.0 10/10/2023    CO2 22 10/10/2023     10/10/2023    BUN 13 10/10/2023    CREATININE 0.79 10/10/2023    GFRMALE 85 06/26/2023     LFTs  Lab Results   Component Value Date    ALT 24 10/10/2023    AST 22 10/10/2023    ALKPHOS 135 10/10/2023    BILITOT 0.4 10/10/2023     FLP  Lab Results   Component Value Date    TRIG 134 12/19/2022    CHOL 145 12/19/2022    LDLF 69 12/19/2022    HDL 49.1 12/19/2022     Urine Microalbumin  No results found for: \"MICROALBCREA\"  Vitamin B12  No results found for: \"[id number]\"      Assessment/Plan   Problem List Items Addressed This Visit       Type 2 diabetes mellitus with diabetic neuropathy, without long-term current use of insulin (CMS/[facility])     Uncontrolled with last A1c 7.3% on 10/10/23 which is not at goal of <7%. Current regimen includes Metformin XR 500mg 2 tabs BID. Patient's nausea and vomiting has resolved. Patient states tolerability to the Metformin but states missing about " one dose a week. He is trying to get into a routine to help him merritt remember. Home BG readings fasting range 107- 149. He denies symptoms of hyperglycemia. He does state on Friday he had an episode of feeling very faint with weakness and dizziness. He states he was unable to test to see if this was due to blood sugars. It resolved quickly. Since patient is not at goal, considered adding an SGLT2. Patient states he wants to think about it before starting a new medication and would like to follow up next visit regarding the addition. Due to patient preference, plan to continue medication regimen as is.  Continue taking Metformin XR 500mg 2 tabs BID  Glucose tablets sent to [facility]  Continue to monitor and record BG daily   Labs placed for urine microalbumin and B12 to be completed at next PCP visit 11/128    Future Considerations: Consider SGLT2i with caution due to Hx of UTI and hospitalization for dehydration. May also consider sulfonylurea            Patient Education:  Counseled patient on relevant MOA, expectations, side effects, duration of therapy, contraindications, administration, and monitoring parameters.  All questions and concerns addressed. Contact pharmacist with any further questions or concerns prior to next appointment.  Reviewed BG goals: Fasting BG goal , Postprandial BG goal <180 mg/dL, A1C goal <7%  Reviewed signs, symptoms, and treatment of hypoglycemia.    Clinical Pharmacist follow-up: 12/11/23 10:30 am, Telehealth visit  Next PCP appointment: 11/28/23    Nikki Hurd, PharmD  Clinical Pharmacist  11/13/23    Continue all meds under the continuation of care with the referring provider and clinical pharmacy team.  Verbal consent to manage patient's drug therapy was obtained from the patient. They were informed they may decline to participate or withdraw from participation in pharmacy services at any time.

## 2023-11-13 ENCOUNTER — TELEMEDICINE (OUTPATIENT)
Dept: PHARMACY | Facility: HOSPITAL | Age: 68
End: 2023-11-13
Payer: MEDICARE

## 2023-11-13 DIAGNOSIS — E11.40 TYPE 2 DIABETES MELLITUS WITH DIABETIC NEUROPATHY, WITHOUT LONG-TERM CURRENT USE OF INSULIN (MULTI): ICD-10-CM

## 2023-11-13 RX ORDER — IBUPROFEN 200 MG
16 TABLET ORAL AS NEEDED
Qty: 50 TABLET | Refills: 1 | Status: SHIPPED | OUTPATIENT
Start: 2023-11-13 | End: 2024-11-12

## 2023-11-13 NOTE — ASSESSMENT & PLAN NOTE
Uncontrolled with last A1c 7.3% on 10/10/23 which is not at goal of <7%. Current regimen includes Metformin XR 500mg 2 tabs BID. Patient's nausea and vomiting has resolved. Patient states tolerability to the Metformin but states missing about one dose a week. He is trying to get into a routine to help him merritt remember. Home BG readings fasting range 107- 149. He denies symptoms of hyperglycemia. He does state on Friday he had an episode of feeling very faint with weakness and dizziness. He states he was unable to test to see if this was due to blood sugars. It resolved quickly. Since patient is not at goal, considered adding an SGLT2. Patient states he wants to think about it before starting a new medication and would like to follow up next visit regarding the addition. Due to patient preference, plan to continue medication regimen as is.  Continue taking Metformin XR 500mg 2 tabs BID  Continue to monitor and record BG daily   Labs placed for urine microalbumin and B12 to be completed at next PCP visit 11/128    Future Considerations: Consider SGLT2i with caution due to Hx of UTI and hospitalization for dehydration. May also consider sulfonylurea

## 2023-11-13 NOTE — PATIENT INSTRUCTIONS
Hello!    It was nice talking to you today. Below is a brief overview of what we talked about at today's visit.    Continue taking Metformin XR 500mg 2 tabs BID  Continue to monitor and record BG daily   Labs placed for urine microalbumin and B12 to be completed at next PCP visit 11/28  Future Considerations: Addition of Jardiance to discuss further at next visit    If you have any questions or concerns, feel free to reach out.   Nikki Hurd, PharmD  [phone]

## 2023-11-15 ENCOUNTER — APPOINTMENT (OUTPATIENT)
Dept: PRIMARY CARE | Facility: CLINIC | Age: 68
End: 2023-11-15
Payer: MEDICARE

## 2023-11-28 ENCOUNTER — OFFICE VISIT (OUTPATIENT)
Dept: PRIMARY CARE | Facility: CLINIC | Age: 68
End: 2023-11-28
Payer: MEDICARE

## 2023-11-28 VITALS
WEIGHT: 231.4 LBS | HEART RATE: 102 BPM | BODY MASS INDEX: 31.34 KG/M2 | OXYGEN SATURATION: 97 % | DIASTOLIC BLOOD PRESSURE: 64 MMHG | SYSTOLIC BLOOD PRESSURE: 110 MMHG | TEMPERATURE: 97.5 F | HEIGHT: 72 IN

## 2023-11-28 DIAGNOSIS — Z12.12 SCREENING FOR COLORECTAL CANCER: ICD-10-CM

## 2023-11-28 DIAGNOSIS — I10 BENIGN ESSENTIAL HYPERTENSION: ICD-10-CM

## 2023-11-28 DIAGNOSIS — F32.0 DEPRESSION, MAJOR, SINGLE EPISODE, MILD (CMS-HCC): ICD-10-CM

## 2023-11-28 DIAGNOSIS — Z23 NEED FOR VACCINATION: ICD-10-CM

## 2023-11-28 DIAGNOSIS — E11.40 TYPE 2 DIABETES MELLITUS WITH DIABETIC NEUROPATHY, WITHOUT LONG-TERM CURRENT USE OF INSULIN (MULTI): ICD-10-CM

## 2023-11-28 DIAGNOSIS — Z12.5 SCREENING FOR PROSTATE CANCER: ICD-10-CM

## 2023-11-28 DIAGNOSIS — Z13.6 SCREENING FOR CARDIOVASCULAR CONDITION: ICD-10-CM

## 2023-11-28 DIAGNOSIS — Z12.11 SCREENING FOR COLORECTAL CANCER: ICD-10-CM

## 2023-11-28 DIAGNOSIS — Z00.00 ROUTINE GENERAL MEDICAL EXAMINATION AT HEALTH CARE FACILITY: Primary | ICD-10-CM

## 2023-11-28 PROBLEM — R06.83 SNORING: Status: RESOLVED | Noted: 2023-03-06 | Resolved: 2023-11-28

## 2023-11-28 PROBLEM — J32.9: Status: RESOLVED | Noted: 2023-02-06 | Resolved: 2023-11-28

## 2023-11-28 PROBLEM — J32.4 CHRONIC PANSINUSITIS: Status: RESOLVED | Noted: 2023-02-06 | Resolved: 2023-11-28

## 2023-11-28 PROBLEM — R10.9 ABDOMINAL PAIN: Status: RESOLVED | Noted: 2023-02-06 | Resolved: 2023-11-28

## 2023-11-28 PROBLEM — R44.8 FACIAL PRESSURE: Status: RESOLVED | Noted: 2023-02-06 | Resolved: 2023-11-28

## 2023-11-28 PROBLEM — R11.15 PERSISTENT VOMITING: Status: RESOLVED | Noted: 2023-02-06 | Resolved: 2023-11-28

## 2023-11-28 LAB
CHOLEST SERPL-MCNC: 162 MG/DL (ref 0–199)
CHOLESTEROL/HDL RATIO: 2.8
CREAT UR-MCNC: 139.5 MG/DL (ref 20–370)
HDLC SERPL-MCNC: 57.8 MG/DL
LDLC SERPL CALC-MCNC: 85 MG/DL
MICROALBUMIN UR-MCNC: 281.2 MG/L
MICROALBUMIN/CREAT UR: 201.6 UG/MG CREAT
NON HDL CHOLESTEROL: 104 MG/DL (ref 0–149)
PSA SERPL-MCNC: 2.79 NG/ML
TRIGL SERPL-MCNC: 96 MG/DL (ref 0–149)
VIT B12 SERPL-MCNC: 350 PG/ML (ref 211–911)
VLDL: 19 MG/DL (ref 0–40)

## 2023-11-28 PROCEDURE — 1159F MED LIST DOCD IN RCRD: CPT | Performed by: FAMILY MEDICINE

## 2023-11-28 PROCEDURE — 4010F ACE/ARB THERAPY RXD/TAKEN: CPT | Performed by: FAMILY MEDICINE

## 2023-11-28 PROCEDURE — 80061 LIPID PANEL: CPT

## 2023-11-28 PROCEDURE — 82043 UR ALBUMIN QUANTITATIVE: CPT

## 2023-11-28 PROCEDURE — 36415 COLL VENOUS BLD VENIPUNCTURE: CPT

## 2023-11-28 PROCEDURE — 1160F RVW MEDS BY RX/DR IN RCRD: CPT | Performed by: FAMILY MEDICINE

## 2023-11-28 PROCEDURE — 1036F TOBACCO NON-USER: CPT | Performed by: FAMILY MEDICINE

## 2023-11-28 PROCEDURE — 82570 ASSAY OF URINE CREATININE: CPT

## 2023-11-28 PROCEDURE — 3060F POS MICROALBUMINURIA REV: CPT | Performed by: FAMILY MEDICINE

## 2023-11-28 PROCEDURE — 1126F AMNT PAIN NOTED NONE PRSNT: CPT | Performed by: FAMILY MEDICINE

## 2023-11-28 PROCEDURE — 82607 VITAMIN B-12: CPT

## 2023-11-28 PROCEDURE — 90677 PCV20 VACCINE IM: CPT | Performed by: FAMILY MEDICINE

## 2023-11-28 PROCEDURE — 3051F HG A1C>EQUAL 7.0%<8.0%: CPT | Performed by: FAMILY MEDICINE

## 2023-11-28 PROCEDURE — G0103 PSA SCREENING: HCPCS

## 2023-11-28 PROCEDURE — 3074F SYST BP LT 130 MM HG: CPT | Performed by: FAMILY MEDICINE

## 2023-11-28 PROCEDURE — 90662 IIV NO PRSV INCREASED AG IM: CPT | Performed by: FAMILY MEDICINE

## 2023-11-28 PROCEDURE — G0439 PPPS, SUBSEQ VISIT: HCPCS | Performed by: FAMILY MEDICINE

## 2023-11-28 PROCEDURE — 3078F DIAST BP <80 MM HG: CPT | Performed by: FAMILY MEDICINE

## 2023-11-28 PROCEDURE — 3008F BODY MASS INDEX DOCD: CPT | Performed by: FAMILY MEDICINE

## 2023-11-28 PROCEDURE — 1170F FXNL STATUS ASSESSED: CPT | Performed by: FAMILY MEDICINE

## 2023-11-28 PROCEDURE — 99213 OFFICE O/P EST LOW 20 MIN: CPT | Performed by: FAMILY MEDICINE

## 2023-11-28 PROCEDURE — G0008 ADMIN INFLUENZA VIRUS VAC: HCPCS | Performed by: FAMILY MEDICINE

## 2023-11-28 PROCEDURE — G0009 ADMIN PNEUMOCOCCAL VACCINE: HCPCS | Performed by: FAMILY MEDICINE

## 2023-11-28 ASSESSMENT — ACTIVITIES OF DAILY LIVING (ADL)
BATHING: INDEPENDENT
MANAGING_FINANCES: INDEPENDENT
TAKING_MEDICATION: INDEPENDENT
GROCERY_SHOPPING: INDEPENDENT
DOING_HOUSEWORK: INDEPENDENT
DRESSING: INDEPENDENT

## 2023-11-28 ASSESSMENT — ENCOUNTER SYMPTOMS
DEPRESSION: 0
OCCASIONAL FEELINGS OF UNSTEADINESS: 0
LOSS OF SENSATION IN FEET: 0

## 2023-11-28 ASSESSMENT — PATIENT HEALTH QUESTIONNAIRE - PHQ9
1. LITTLE INTEREST OR PLEASURE IN DOING THINGS: SEVERAL DAYS
2. FEELING DOWN, DEPRESSED OR HOPELESS: SEVERAL DAYS
10. IF YOU CHECKED OFF ANY PROBLEMS, HOW DIFFICULT HAVE THESE PROBLEMS MADE IT FOR YOU TO DO YOUR WORK, TAKE CARE OF THINGS AT HOME, OR GET ALONG WITH OTHER PEOPLE: SOMEWHAT DIFFICULT
SUM OF ALL RESPONSES TO PHQ9 QUESTIONS 1 AND 2: 2

## 2023-11-28 ASSESSMENT — PAIN SCALES - GENERAL: PAINLEVEL: 0-NO PAIN

## 2023-11-28 NOTE — ASSESSMENT & PLAN NOTE
Recommended screening guidelines addressed and orders placed as indicated by age and chronic conditions  Screening labs ordered, will call with results  Strongly encourage pt to follow up and schedule colonoscopy, order placed  Flu and Prevnar today  Will need Tdap at next visit  Continue to work on healthy lifestyle including well balanced diet, regular activity, limit alcohol, no tobacco products and safe sexual practices  Follow up annually

## 2023-11-28 NOTE — PROGRESS NOTES
Subjective   Reason for Visit: Rui Shah is an 68 y.o. male here for a Medicare Wellness visit.          Reviewed all medications by prescribing practitioner or clinical pharmacist (such as prescriptions, OTCs, herbal therapies and supplements) and documented in the medical record.    HPI  Nausea and vomiting: has pretty much resolved, had mild episode this morning but not the same extent    DMII: Am glucose has gone from 119 since stopping Trulicity, up to 180, Metformin does not seem to be controlling glucose at this time    Depression: taking Zoloft and Wellbutrin, seeing counselor, depression has improved somewhat with nausea improving     Colonoscopy: past due, had it in 2018 with 3 year follow up recommended, lost to follow up secondary to COVID    Patient Care Team:  Juliane Guzman MD as PCP - General (Family Medicine)  Odilia OwenD as Pharmacist (Family Medicine)     Review of Systems  Negative unless noted in HPI    Objective   Vitals:  /64 (BP Location: Left arm, Patient Position: Sitting, BP Cuff Size: Large adult)   Pulse 102   Temp 36.4 °C (97.5 °F) (Oral)   Ht 1.829 m (6')   Wt 105 kg (231 lb 6.4 oz)   SpO2 97%   BMI 31.38 kg/m²       Physical Exam  HENT:      Right Ear: External ear normal.      Left Ear: External ear normal.   Eyes:      Conjunctiva/sclera: Conjunctivae normal.   Cardiovascular:      Rate and Rhythm: Normal rate and regular rhythm.   Pulmonary:      Effort: Pulmonary effort is normal.      Breath sounds: Normal breath sounds.   Abdominal:      General: Abdomen is flat.      Palpations: Abdomen is soft.   Musculoskeletal:         General: Normal range of motion.      Cervical back: Normal range of motion.   Skin:     General: Skin is warm.   Neurological:      General: No focal deficit present.      Mental Status: He is alert and oriented to person, place, and time.   Psychiatric:         Mood and Affect: Mood normal.         Assessment/Plan   Problem List  Items Addressed This Visit       Benign essential hypertension    Current Assessment & Plan     Well controlled  Continue current regimen         Type 2 diabetes mellitus with diabetic neuropathy, without long-term current use of insulin (CMS/HCC)    Current Assessment & Plan     Pt will need additional coverage outside of Metformin  Unable to tolerated GLP-1  Consider Jardiance, will reach out to pharmacy to see if any coverage assistance available  Follow up in 3 months         Depression, major, single episode, mild (CMS/HCC)    Current Assessment & Plan     Stable, symptoms somewhat improved from previous visit  Continue current regimen         Routine general medical examination at health care facility - Primary    Current Assessment & Plan     Recommended screening guidelines addressed and orders placed as indicated by age and chronic conditions  Screening labs ordered, will call with results  Strongly encourage pt to follow up and schedule colonoscopy, order placed  Flu and Prevnar today  Will need Tdap at next visit  Continue to work on healthy lifestyle including well balanced diet, regular activity, limit alcohol, no tobacco products and safe sexual practices  Follow up annually           Relevant Orders    Vitamin B12    Albumin, urine, random (Completed)     Other Visit Diagnoses       Screening for cardiovascular condition        Relevant Orders    Lipid panel    Need for vaccination        Relevant Orders    Flu vaccine, high dose seasonal, preservative free (Completed)    Pneumococcal conjugate vaccine 20-valent IM (Completed)    Screening for colorectal cancer        Relevant Orders    Colonoscopy Screening; Average Risk Patient    Screening for prostate cancer        Relevant Orders    Prostate Specific Antigen, Screen

## 2023-11-28 NOTE — ASSESSMENT & PLAN NOTE
Pt will need additional coverage outside of Metformin  Unable to tolerated GLP-1  Consider Jardiance, will reach out to pharmacy to see if any coverage assistance available  Follow up in 3 months

## 2023-11-29 ENCOUNTER — DOCUMENTATION (OUTPATIENT)
Dept: PRIMARY CARE | Facility: CLINIC | Age: 68
End: 2023-11-29
Payer: MEDICARE

## 2023-11-29 DIAGNOSIS — F41.9 ANXIETY: Primary | ICD-10-CM

## 2023-11-29 DIAGNOSIS — F34.1 PERSISTENT DEPRESSIVE DISORDER: ICD-10-CM

## 2023-11-29 DIAGNOSIS — E11.40 TYPE 2 DIABETES MELLITUS WITH DIABETIC NEUROPATHY, WITHOUT LONG-TERM CURRENT USE OF INSULIN (MULTI): Primary | ICD-10-CM

## 2023-11-29 PROCEDURE — 99493 SBSQ PSYC COLLAB CARE MGMT: CPT | Performed by: FAMILY MEDICINE

## 2023-11-29 NOTE — PROGRESS NOTES
Pharmacist Consult - Diabetes  Referring Provider: Juliane Guzman MD    Rui Shah is a 68 y.o. male referred to pharmacy for management of Diabetes.    Due to uncontrolled BG, patient would benefit from additional agent. Patient was screened for  Patient Assistance Program, however likely does not qualify based on reported income. Not eligible for mfg copay assistance with Medicare.  Test claim copay for either Jardiance or Farxiga is $47/month through patient insurance. Patient is agreeable to trying the medication at this cost.     Counseled patient on relevant MOA, expectations, side effects, duration of therapy, contraindications, administration, and monitoring parameters for Jardiance. All questions and concerns addressed. Pharmacy follow-up appointment scheduled on 12/11/23.      Odilia OwenD  Clinical Pharmacist  11/29/23    Continue all meds under the continuation of care with the referring provider and clinical pharmacy team.  Verbal consent to manage patient's drug therapy was obtained from the patient. They were informed they may decline to participate or withdraw from participation in pharmacy services at any time.

## 2023-11-30 ENCOUNTER — OFFICE VISIT (OUTPATIENT)
Dept: ORTHOPEDIC SURGERY | Facility: CLINIC | Age: 68
End: 2023-11-30
Payer: MEDICARE

## 2023-11-30 ENCOUNTER — ANCILLARY PROCEDURE (OUTPATIENT)
Dept: RADIOLOGY | Facility: CLINIC | Age: 68
End: 2023-11-30
Payer: MEDICARE

## 2023-11-30 VITALS — WEIGHT: 231 LBS | BODY MASS INDEX: 31.29 KG/M2 | HEIGHT: 72 IN

## 2023-11-30 DIAGNOSIS — S82.891D CLOSED FRACTURE OF RIGHT ANKLE WITH ROUTINE HEALING, SUBSEQUENT ENCOUNTER: ICD-10-CM

## 2023-11-30 PROCEDURE — 3060F POS MICROALBUMINURIA REV: CPT | Performed by: ORTHOPAEDIC SURGERY

## 2023-11-30 PROCEDURE — 3008F BODY MASS INDEX DOCD: CPT | Performed by: ORTHOPAEDIC SURGERY

## 2023-11-30 PROCEDURE — 3048F LDL-C <100 MG/DL: CPT | Performed by: ORTHOPAEDIC SURGERY

## 2023-11-30 PROCEDURE — 1036F TOBACCO NON-USER: CPT | Performed by: ORTHOPAEDIC SURGERY

## 2023-11-30 PROCEDURE — 1159F MED LIST DOCD IN RCRD: CPT | Performed by: ORTHOPAEDIC SURGERY

## 2023-11-30 PROCEDURE — 3051F HG A1C>EQUAL 7.0%<8.0%: CPT | Performed by: ORTHOPAEDIC SURGERY

## 2023-11-30 PROCEDURE — 4010F ACE/ARB THERAPY RXD/TAKEN: CPT | Performed by: ORTHOPAEDIC SURGERY

## 2023-11-30 PROCEDURE — 1160F RVW MEDS BY RX/DR IN RCRD: CPT | Performed by: ORTHOPAEDIC SURGERY

## 2023-11-30 PROCEDURE — 1126F AMNT PAIN NOTED NONE PRSNT: CPT | Performed by: ORTHOPAEDIC SURGERY

## 2023-11-30 PROCEDURE — 99213 OFFICE O/P EST LOW 20 MIN: CPT | Performed by: ORTHOPAEDIC SURGERY

## 2023-11-30 PROCEDURE — 73610 X-RAY EXAM OF ANKLE: CPT | Mod: RIGHT SIDE | Performed by: RADIOLOGY

## 2023-11-30 PROCEDURE — 73610 X-RAY EXAM OF ANKLE: CPT | Mod: RT,FY

## 2023-11-30 ASSESSMENT — ENCOUNTER SYMPTOMS
DEPRESSION: 0
LOSS OF SENSATION IN FEET: 0
OCCASIONAL FEELINGS OF UNSTEADINESS: 1

## 2023-11-30 ASSESSMENT — PAIN SCALES - GENERAL: PAINLEVEL_OUTOF10: 7

## 2023-11-30 ASSESSMENT — PAIN DESCRIPTION - DESCRIPTORS: DESCRIPTORS: ACHING;SORE;SHARP

## 2023-11-30 ASSESSMENT — PAIN - FUNCTIONAL ASSESSMENT: PAIN_FUNCTIONAL_ASSESSMENT: 0-10

## 2023-11-30 NOTE — PROGRESS NOTES
RT complaint my right ankle still hurts in the back but it continues to get better.    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  We did about a 20 degree correction of his apex anterior deformity and varus correction.  Overall he is doing pretty well but he still getting pain around the Achilles tendon at but he is wearing a regular shoe he is out of the boot fully weightbearing he uses a cane from time to time.    His physical exam reveals that he dorsiflexes to about 5 to 7 degrees plantar flexes to about 30 he has about a 1-2+ effusion but not really in his ankle joint more so proximally.  His overall alignment appears to be clinically good on the AP and lateral visualization.    X-rays of his right distal tibia reveal excellent callus formation of the tibia and fibula both appear to be healed there is no hardware failure total ankle replacement appears to be in better position now compared to the tibial axis.    Assessment distal tibia and fibula correction above total ankle replacement now with better motion and better alignment of the ankle.    Plan patient is a diabetic and he is not 100% healed and still having some symptoms posteriorly.  I would like to see him back in 3 months.  At that time I would likely order ankle x-rays 3 views and a tib-fib x-ray

## 2023-12-01 DIAGNOSIS — Z12.11 COLON CANCER SCREENING: Primary | ICD-10-CM

## 2023-12-01 RX ORDER — SODIUM, POTASSIUM,MAG SULFATES 17.5-3.13G
SOLUTION, RECONSTITUTED, ORAL ORAL
Qty: 1 EACH | Refills: 0 | Status: SHIPPED | OUTPATIENT
Start: 2023-12-01

## 2023-12-11 ENCOUNTER — TELEMEDICINE (OUTPATIENT)
Dept: PHARMACY | Facility: HOSPITAL | Age: 68
End: 2023-12-11
Payer: MEDICARE

## 2023-12-11 DIAGNOSIS — E11.40 TYPE 2 DIABETES MELLITUS WITH DIABETIC NEUROPATHY, WITHOUT LONG-TERM CURRENT USE OF INSULIN (MULTI): ICD-10-CM

## 2023-12-11 NOTE — PATIENT INSTRUCTIONS
Hi!    It was very nice talking with you today. Follow up with me scheduled 1/8/24 2 pm. Below is a brief overview of what we talked about at today's appointment.    Continue taking Metformin XR 500mg 2 tabs BID and Jardiance 25 mg daily  Limit intake of carbohydrates and sugars and increase intake of fruits, veggies, and proteins  Continue to monitor and record BG daily    If you ever have any questions or concerns, feel free to reach out.    Nikki Hurd, PharmD  [phone]

## 2023-12-11 NOTE — ASSESSMENT & PLAN NOTE
Diabetes: Uncontrolled with last A1c 7.3% on 10/10/23 which is not at goal of <7%. Current regimen includes Metformin XR 500mg 2 tabs BID and Jardiance 25 mg daily. Patient states adherence and tolerating the medication. Patient is still experiencing some mild nausea in the morning which is relieved with his Zofran. Patient states he is trying to make some dietary changes and focus on food labels more. Home blood sugars are usually taken in the morning before food ranging anywhere from 100-200 but does sometime test after breakfast. Patient states some of his blood sugars are true fasting and others after eating. Patient denies any symptoms of low blood sugars, but does have symptoms of high blood sugars including increased thirst and urination. Due to patient recently started on Jardiance (11/29), plan to make no medication changes today and focus on dietary changes.  Continue taking Metformin XR 500mg 2 tabs BID and Jardiance 25 mg daily  Limit intake of carbohydrates and sugars and increase intake of fruits, veggies, and proteins  Continue to monitor and record BG daily

## 2023-12-12 DIAGNOSIS — E11.40 TYPE 2 DIABETES MELLITUS WITH DIABETIC NEUROPATHY, WITHOUT LONG-TERM CURRENT USE OF INSULIN (MULTI): ICD-10-CM

## 2023-12-14 ENCOUNTER — OFFICE VISIT (OUTPATIENT)
Dept: GASTROENTEROLOGY | Facility: CLINIC | Age: 68
End: 2023-12-14
Payer: MEDICARE

## 2023-12-14 VITALS
WEIGHT: 227 LBS | BODY MASS INDEX: 30.09 KG/M2 | SYSTOLIC BLOOD PRESSURE: 118 MMHG | TEMPERATURE: 97.5 F | HEIGHT: 73 IN | DIASTOLIC BLOOD PRESSURE: 88 MMHG | HEART RATE: 102 BPM

## 2023-12-14 DIAGNOSIS — R11.15 PERSISTENT VOMITING: ICD-10-CM

## 2023-12-14 DIAGNOSIS — R11.2 NAUSEA AND VOMITING, UNSPECIFIED VOMITING TYPE: Primary | ICD-10-CM

## 2023-12-14 PROCEDURE — 3008F BODY MASS INDEX DOCD: CPT | Performed by: NURSE PRACTITIONER

## 2023-12-14 PROCEDURE — 1036F TOBACCO NON-USER: CPT | Performed by: NURSE PRACTITIONER

## 2023-12-14 PROCEDURE — 3079F DIAST BP 80-89 MM HG: CPT | Performed by: NURSE PRACTITIONER

## 2023-12-14 PROCEDURE — 1125F AMNT PAIN NOTED PAIN PRSNT: CPT | Performed by: NURSE PRACTITIONER

## 2023-12-14 PROCEDURE — 99204 OFFICE O/P NEW MOD 45 MIN: CPT | Performed by: NURSE PRACTITIONER

## 2023-12-14 PROCEDURE — 3051F HG A1C>EQUAL 7.0%<8.0%: CPT | Performed by: NURSE PRACTITIONER

## 2023-12-14 PROCEDURE — 3048F LDL-C <100 MG/DL: CPT | Performed by: NURSE PRACTITIONER

## 2023-12-14 PROCEDURE — 1159F MED LIST DOCD IN RCRD: CPT | Performed by: NURSE PRACTITIONER

## 2023-12-14 PROCEDURE — 1160F RVW MEDS BY RX/DR IN RCRD: CPT | Performed by: NURSE PRACTITIONER

## 2023-12-14 PROCEDURE — 3060F POS MICROALBUMINURIA REV: CPT | Performed by: NURSE PRACTITIONER

## 2023-12-14 PROCEDURE — 99214 OFFICE O/P EST MOD 30 MIN: CPT | Performed by: NURSE PRACTITIONER

## 2023-12-14 PROCEDURE — 3074F SYST BP LT 130 MM HG: CPT | Performed by: NURSE PRACTITIONER

## 2023-12-14 PROCEDURE — 4010F ACE/ARB THERAPY RXD/TAKEN: CPT | Performed by: NURSE PRACTITIONER

## 2023-12-14 ASSESSMENT — ENCOUNTER SYMPTOMS
PSYCHIATRIC NEGATIVE: 1
CARDIOVASCULAR NEGATIVE: 1
ABDOMINAL PAIN: 1
NEUROLOGICAL NEGATIVE: 1
HEMATOLOGIC/LYMPHATIC NEGATIVE: 1
NAUSEA: 1
RESPIRATORY NEGATIVE: 1
MUSCULOSKELETAL NEGATIVE: 1
CONSTITUTIONAL NEGATIVE: 1
ENDOCRINE NEGATIVE: 1
EYES NEGATIVE: 1

## 2023-12-14 ASSESSMENT — PAIN SCALES - GENERAL: PAINLEVEL: 4

## 2023-12-14 NOTE — PATIENT INSTRUCTIONS
Nausea and vomiting- your symptoms can be related to the medications you are taking for your diabetes and weight loss. You have been doing well with small more frequent meals instead of larger meals. I would recommend continuing this. I will order an egd to be done with your colonoscopy to assess the stomach.    Constipation- Add a fiber daily such as metamucil, citrucel or benefiber to help with this.     I will see you back for follow up after your testing

## 2023-12-14 NOTE — PROGRESS NOTES
Subjective   Patient ID: Rui Shah is a 68 y.o. male who presents for New Patient Visit (New patient).  HPI    68-year-old male for new patient visit for nausea and stomach pain  Previous patient of Dr. Harish Baxter retired  History of GERD  EGD and colonoscopy 6/27/2018 EGD showed fundic gland polyps and negative for H. pylori  Colonoscopy showed 3 tubular adenomas with no dysplasia and diverticulosis  Labs reviewed 10/10/2023 normal TSH  Hemoglobin A1c 7.3%  Normal CBC  11/7/2023 CT the abdomen pelvis for early satiety and abdominal pain showed fatty liver, 10 cm urinary bladder diverticulum, ventral abdominal wall mesh with no recurrent hernia, fat-containing left inguinal hernia is observed  Vomiting of bile and stomach acid, early satiety  Was on Trulicity and then changed to jardiance, not throwing up now, able to eat now and losing weight  BM: hard pebbly stool  Drinking water- 4 bottles plus dinner  Has had stomach issues before this as well    Review of Systems   Constitutional: Negative.    HENT: Negative.     Eyes: Negative.    Respiratory: Negative.     Cardiovascular: Negative.    Gastrointestinal:  Positive for abdominal pain and nausea.   Endocrine: Negative.    Genitourinary: Negative.    Musculoskeletal: Negative.    Skin: Negative.    Neurological: Negative.    Hematological: Negative.    Psychiatric/Behavioral: Negative.         Objective   Physical Exam  Constitutional:       Appearance: Normal appearance.   HENT:      Head: Normocephalic.      Nose: Nose normal.      Mouth/Throat:      Mouth: Mucous membranes are moist.   Eyes:      Pupils: Pupils are equal, round, and reactive to light.   Cardiovascular:      Rate and Rhythm: Normal rate and regular rhythm.      Pulses: Normal pulses.      Heart sounds: Normal heart sounds.   Pulmonary:      Effort: Pulmonary effort is normal.      Breath sounds: Normal breath sounds.   Abdominal:      General: Bowel sounds are normal.      Palpations:  Abdomen is soft.      Tenderness: There is abdominal tenderness.   Musculoskeletal:         General: Normal range of motion.      Cervical back: Normal range of motion.   Skin:     General: Skin is warm and dry.   Neurological:      Mental Status: He is alert.   Psychiatric:         Mood and Affect: Mood normal.         Assessment/Plan     Nausea and vomiting- your symptoms can be related to the medications you are taking for your diabetes and weight loss. You have been doing well with small more frequent meals instead of larger meals. I would recommend continuing this. I will order an egd to be done with your colonoscopy to assess the stomach.    Constipation- Add a fiber daily such as metamucil, citrucel or benefiber to help with this.     I will see you back for follow up after your testing       AVERY Ocasio 12/14/23 3:33 PM

## 2024-01-03 DIAGNOSIS — F32.0 DEPRESSION, MAJOR, SINGLE EPISODE, MILD (CMS-HCC): ICD-10-CM

## 2024-01-03 RX ORDER — VENLAFAXINE HYDROCHLORIDE 75 MG/1
CAPSULE, EXTENDED RELEASE ORAL
Qty: 270 CAPSULE | Refills: 0 | Status: SHIPPED | OUTPATIENT
Start: 2024-01-03 | End: 2024-03-01 | Stop reason: SDUPTHER

## 2024-01-06 NOTE — PROGRESS NOTES
[facility] Pharmacist Visit - Diabetes Management  Referring Provider: Juliane Guzman MD    Subjective   Rui Shah is a 68 y.o. male who presents for Diabetes. Still having bouts of nausea and vomiting. Had an appointment last month with GI. Seeing them again in February.     HPI  PMH significant for T2DM, HTN, GERD, depression, asthma, HLD, and MARIELA. .  Patient has no known history of medullary thyroid cancer, pancreatitis, or complicated UTI.  Diagnosed 6-7 years ago. Known DM complications include peripheral neuropathy.  Special needs/barriers to therapy: none    Patient endorses frequent dizziness and worsening tinnitus recently, as well as difficulty sleeping. Denies hypoglycemia, confirms staying well-hydrated. Advised patient to contact PCP's office regarding symptoms.    Patient also endorses symptoms of neuropathy in both feet, worsening and traveling up his ankles/lower leg. Will place podiatry referral. Patient advised to schedule at earliest convenience.    Diabetes Management  Current Medications: Metformin XR 500mg 2 tabs in the morning and 2 tabs in the evening, Jardiance 25 mg daily  Previous Medications: Trulicity (vomiting)     Adherence: Takes medication as directed and reports no missed doses  Adverse Effects: None      Risk Reducing Meds  On ACEi/ARB: Yes   On SGLT2i: Yes   On GLP1-RA: No (vomiting)  On Statin: Yes     Glucose Monitoring  Glucometer/CGM Type: One Touch Ultra 2     Previous home BG readings: (12/11/23) morning readings 100 - 200 (fasting mostly, some after breakfast) 161 Post prandial    Current home BG readings: All BG readings <125     Hypoglycemia: No BG readings < 80 mg/dL. Has dizziness, but not suspected to be caused by hypoglycemia. No other symptoms.  Hyperglycemia: Polyuria (frequent urination) and Polydipsia (frequent thirst)    Diabetic Eye Exam: Needs completed, last summer 2022  Monofilament Foot Exam: Needs completed, last unknown      Secondary Prevention  ASCVD Risk:    The 10-year ASCVD risk score (Lamin LORENZ, et al., 2019) is: 22.2%    Values used to calculate the score:      Age: 68 years      Sex: Male      Is Non- : No      Diabetic: Yes      Tobacco smoker: No      Systolic Blood Pressure: 118 mmHg      Is BP treated: Yes      HDL Cholesterol: 57.8 mg/dL      Total Cholesterol: 162 mg/dL  On Statin?: Yes, rosuvastatin 10 mg    BP Readings from Last 3 Encounters:   12/14/23 118/88   11/28/23 110/64   11/07/23 122/78     Current HTN Regimen: losartan 25mg daily, diltiazem ER 240mg daily      HTN at goal? Yes    Immunizations Needed: Annual Flu, RSV, Prevnar, Shingrix, Tdap, and Hepatitis B Series  Tobacco Use: non-smoker    Lifestyle  Diet: 3 meals/day. Trying to watch food labels. No recent changes.   Physical Activity: Minimal. Would like to exercise, but has significant pain/aches after excessive movement.    Objective   Vitals  BP Readings from Last 2 Encounters:   12/14/23 118/88   11/28/23 110/64     BMI Readings from Last 1 Encounters:   12/14/23 29.95 kg/m²      Labs  A1C  Lab Results   Component Value Date    HGBA1C 7.3 (H) 10/10/2023    HGBA1C 9.6 (A) 06/26/2023    HGBA1C 6.7 04/02/2019     BMP  Lab Results   Component Value Date    CALCIUM 9.6 10/10/2023     10/10/2023    K 4.0 10/10/2023    CO2 22 10/10/2023     10/10/2023    BUN 13 10/10/2023    CREATININE 0.79 10/10/2023    GFRMALE 85 06/26/2023     LFTs  Lab Results   Component Value Date    ALT 24 10/10/2023    AST 22 10/10/2023    ALKPHOS 135 10/10/2023    BILITOT 0.4 10/10/2023     FLP  Lab Results   Component Value Date    TRIG 96 11/28/2023    CHOL 162 11/28/2023    LDLF 69 12/19/2022    HDL 57.8 11/28/2023     Urine Microalbumin  Lab Results   Component Value Date    MICROALBCREA 201.6 (H) 11/28/2023     Vitamin B12  Lab Results   Component Value Date    RVBBMEOW31 350 11/28/2023         Assessment/Plan   Problem List Items Addressed This Visit       Type 2 diabetes  mellitus with diabetic neuropathy, without long-term current use of insulin (CMS/[facility])     Diabetes: Uncontrolled with last A1c 7.3% on 10/10/23. Due for updated A1c. Current regimen includes Metformin XR 500mg 2 tabs BID and Jardiance 25 mg daily. Home BG readings reported at goal. Denies hypoglycemia. Podiatry referral placed for worsening symptoms of neuropathy. Patient advised to contact PCP regarding recent dizziness, worsening tinnitus, and difficulty sleeping. Due to home BG readings at goal and current complaints not suspected to be caused by medications, plan to continue current regimen.  Continue taking Metformin XR 500mg 2 tabs BID and Jardiance 25 mg daily.  Continue to monitor and record BG daily         Relevant Orders    Follow Up In [facility] - Pharmacy     Patient Education:  Counseled patient on relevant MOA, expectations, side effects, duration of therapy, contraindications, administration, and monitoring parameters.  All questions and concerns addressed. Contact pharmacist with any further questions or concerns prior to next appointment.  Reviewed BG goals: Fasting BG goal , Postprandial BG goal <180 mg/dL, A1C goal <7%  Reviewed signs, symptoms, and treatment of hypoglycemia.    Clinical Pharmacist follow-up: 2/5/23 at 2:00pm, Telehealth visit  Next PCP appointment: 3/1/24    Gini Lockhart PharmD  Clinical Pharmacist  01/08/24    Continue all meds under the continuation of care with the referring provider and clinical pharmacy team.  Verbal consent to manage patient's drug therapy was obtained from the patient. They were informed they may decline to participate or withdraw from participation in pharmacy services at any time.         good balance

## 2024-01-08 ENCOUNTER — TELEMEDICINE (OUTPATIENT)
Dept: PHARMACY | Facility: HOSPITAL | Age: 69
End: 2024-01-08
Payer: MEDICARE

## 2024-01-08 DIAGNOSIS — E11.40 TYPE 2 DIABETES MELLITUS WITH DIABETIC NEUROPATHY, WITHOUT LONG-TERM CURRENT USE OF INSULIN (MULTI): ICD-10-CM

## 2024-01-08 NOTE — ASSESSMENT & PLAN NOTE
Diabetes: Uncontrolled with last A1c 7.3% on 10/10/23. Due for updated A1c. Current regimen includes Metformin XR 500mg 2 tabs BID and Jardiance 25 mg daily. Home BG readings reported at goal. Denies hypoglycemia. Podiatry referral placed for worsening symptoms of neuropathy. Patient advised to contact PCP regarding recent dizziness, worsening tinnitus, and difficulty sleeping. Due to home BG readings at goal and current complaints not suspected to be caused by medications, plan to continue current regimen.  Continue taking Metformin XR 500mg 2 tabs BID and Jardiance 25 mg daily.  Continue to monitor and record BG daily

## 2024-01-11 ENCOUNTER — OFFICE VISIT (OUTPATIENT)
Dept: PRIMARY CARE | Facility: CLINIC | Age: 69
End: 2024-01-11
Payer: MEDICARE

## 2024-01-11 VITALS
OXYGEN SATURATION: 97 % | WEIGHT: 223.6 LBS | SYSTOLIC BLOOD PRESSURE: 120 MMHG | BODY MASS INDEX: 29.63 KG/M2 | DIASTOLIC BLOOD PRESSURE: 80 MMHG | HEART RATE: 103 BPM | HEIGHT: 73 IN

## 2024-01-11 DIAGNOSIS — G62.9 PERIPHERAL POLYNEUROPATHY: ICD-10-CM

## 2024-01-11 DIAGNOSIS — N40.0 BENIGN PROSTATIC HYPERPLASIA, UNSPECIFIED WHETHER LOWER URINARY TRACT SYMPTOMS PRESENT: ICD-10-CM

## 2024-01-11 DIAGNOSIS — J01.10 ACUTE FRONTAL SINUSITIS, RECURRENCE NOT SPECIFIED: Primary | ICD-10-CM

## 2024-01-11 PROBLEM — Z00.00 ROUTINE GENERAL MEDICAL EXAMINATION AT HEALTH CARE FACILITY: Status: RESOLVED | Noted: 2023-11-28 | Resolved: 2024-01-11

## 2024-01-11 PROCEDURE — 1159F MED LIST DOCD IN RCRD: CPT | Performed by: FAMILY MEDICINE

## 2024-01-11 PROCEDURE — 1036F TOBACCO NON-USER: CPT | Performed by: FAMILY MEDICINE

## 2024-01-11 PROCEDURE — 1125F AMNT PAIN NOTED PAIN PRSNT: CPT | Performed by: FAMILY MEDICINE

## 2024-01-11 PROCEDURE — 3074F SYST BP LT 130 MM HG: CPT | Performed by: FAMILY MEDICINE

## 2024-01-11 PROCEDURE — 99214 OFFICE O/P EST MOD 30 MIN: CPT | Performed by: FAMILY MEDICINE

## 2024-01-11 PROCEDURE — 3079F DIAST BP 80-89 MM HG: CPT | Performed by: FAMILY MEDICINE

## 2024-01-11 PROCEDURE — 1160F RVW MEDS BY RX/DR IN RCRD: CPT | Performed by: FAMILY MEDICINE

## 2024-01-11 PROCEDURE — 3008F BODY MASS INDEX DOCD: CPT | Performed by: FAMILY MEDICINE

## 2024-01-11 PROCEDURE — 4010F ACE/ARB THERAPY RXD/TAKEN: CPT | Performed by: FAMILY MEDICINE

## 2024-01-11 RX ORDER — AZITHROMYCIN 250 MG/1
TABLET, FILM COATED ORAL
Qty: 6 TABLET | Refills: 0 | Status: SHIPPED | OUTPATIENT
Start: 2024-01-11 | End: 2024-01-15

## 2024-01-11 RX ORDER — AZELASTINE 1 MG/ML
1 SPRAY, METERED NASAL 2 TIMES DAILY
Qty: 30 ML | Refills: 1 | Status: SHIPPED | OUTPATIENT
Start: 2024-01-11 | End: 2025-01-10

## 2024-01-11 RX ORDER — TAMSULOSIN HYDROCHLORIDE 0.4 MG/1
0.4 CAPSULE ORAL DAILY
Qty: 30 CAPSULE | Refills: 11 | Status: CANCELLED | OUTPATIENT
Start: 2024-01-11 | End: 2025-01-10

## 2024-01-11 RX ORDER — TAMSULOSIN HYDROCHLORIDE 0.4 MG/1
0.8 CAPSULE ORAL NIGHTLY
Qty: 180 CAPSULE | Refills: 1 | Status: SHIPPED | OUTPATIENT
Start: 2024-01-11

## 2024-01-11 ASSESSMENT — PATIENT HEALTH QUESTIONNAIRE - PHQ9
1. LITTLE INTEREST OR PLEASURE IN DOING THINGS: SEVERAL DAYS
SUM OF ALL RESPONSES TO PHQ9 QUESTIONS 1 AND 2: 2
10. IF YOU CHECKED OFF ANY PROBLEMS, HOW DIFFICULT HAVE THESE PROBLEMS MADE IT FOR YOU TO DO YOUR WORK, TAKE CARE OF THINGS AT HOME, OR GET ALONG WITH OTHER PEOPLE: VERY DIFFICULT
2. FEELING DOWN, DEPRESSED OR HOPELESS: SEVERAL DAYS

## 2024-01-11 ASSESSMENT — PAIN SCALES - GENERAL: PAINLEVEL: 4

## 2024-01-11 NOTE — ASSESSMENT & PLAN NOTE
Sinusitis resulting in eustachian tube dysfunction and worsened tinnitus  Recommend symptom treatment and monitor  If symptoms fail to improve in next 3-5 days then can start antibiotic as prescribed  Follow up for any changing symptoms

## 2024-01-11 NOTE — ASSESSMENT & PLAN NOTE
Recommend establishing with podiatry for regular foot care and monitoring  Follow up if difficulty with establishing

## 2024-02-09 ENCOUNTER — TELEMEDICINE (OUTPATIENT)
Dept: PHARMACY | Facility: HOSPITAL | Age: 69
End: 2024-02-09
Payer: MEDICARE

## 2024-02-09 DIAGNOSIS — E11.40 TYPE 2 DIABETES MELLITUS WITH DIABETIC NEUROPATHY, WITHOUT LONG-TERM CURRENT USE OF INSULIN (MULTI): ICD-10-CM

## 2024-02-09 NOTE — PROGRESS NOTES
[facility] Pharmacist Visit - Diabetes Management     Subjective   Patient ID: Rui Shah is a 68 y.o. male who presents for No chief complaint on file..    Referring Provider: Juliane Guzman MD     Rui Shah is a 68 y.o. male who presents for Diabetes. Had 1 episode of vomiting which he thinks might be due to hypoglycemia. Had an appointment last month with GI. Seeing them again in February.      HPI  PMH significant for T2DM, HTN, GERD, depression, asthma, HLD, and MARIELA. .  Patient has no known history of medullary thyroid cancer, pancreatitis, or complicated UTI.  Diagnosed 6-7 years ago. Known DM complications include peripheral neuropathy.  Special needs/barriers to therapy: none     Patient endorses frequent dizziness and worsening tinnitus recently, as well as difficulty sleeping. Denies hypoglycemia, though dizziness may be related as he was unable to measure BG during these episodes. Confirms staying well-hydrated. Saw PCP recently who recommended to start antibiotic for tinnitus if symptoms do not resolve.     Patient also endorses symptoms of neuropathy in both feet, worsening and traveling up his ankles/lower leg. Will place podiatry referral. Patient advised to schedule at earliest convenience.     Diabetes Management  Current Medications: Metformin XR 500mg 2 tabs in the morning and 2 tabs in the evening, Jardiance 25 mg daily  Previous Medications: Trulicity (vomiting)      Adherence: Takes medication as directed and reports no missed doses  Adverse Effects: None     Risk Reducing Meds  On ACEi/ARB: Yes   On SGLT2i: Yes   On GLP1-RA: No (vomiting)  On Statin: Yes      Glucose Monitoring  Glucometer/CGM Type: One Touch Ultra 2      Previous home BG readings: (12/11/23) morning readings 100 - 200 (fasting mostly, some after breakfast) 161 Post prandial    Current home BG readings: All BG readings <125, reports highest reading to date being 138     Hypoglycemia: No BG readings < 80 mg/dL. Has dizziness, but  unsure if caused by hypoglycemia. No other symptoms.  Hyperglycemia: Polyuria (frequent urination) and Polydipsia (frequent thirst)     Diabetic Eye Exam: Needs completed, last summer 2022  Monofilament Foot Exam: Needs completed, last unknown     Secondary Prevention  ASCVD Risk:   The 10-year ASCVD risk score (Lamin LORENZ, et al., 2019) is: 22.2%    Values used to calculate the score:      Age: 68 years      Sex: Male      Is Non- : No      Diabetic: Yes      Tobacco smoker: No      Systolic Blood Pressure: 118 mmHg      Is BP treated: Yes      HDL Cholesterol: 57.8 mg/dL      Total Cholesterol: 162 mg/dL  On Statin?: Yes, rosuvastatin 10 mg         BP Readings from Last 3 Encounters:   1/11/24 120/80   12/14/23 118/88   11/28/23 110/64   11/07/23 122/78      Current HTN Regimen: losartan 25mg daily, diltiazem ER 240mg daily      HTN at goal? Yes     Immunizations Needed: Annual Flu, RSV, Prevnar, Shingrix, Tdap, and Hepatitis B Series  Tobacco Use: non-smoker     Lifestyle  Diet: 3 meals/day. Trying to watch food labels. No recent changes.   Physical Activity: Minimal. Would like to exercise, but has significant pain/aches after excessive movement.    Objective     There were no vitals taken for this visit.     Labs  Lab Results   Component Value Date    BILITOT 0.4 10/10/2023    CALCIUM 9.6 10/10/2023    CO2 22 10/10/2023     10/10/2023    CREATININE 0.79 10/10/2023    GLUCOSE 121 (H) 10/10/2023    ALKPHOS 135 10/10/2023    K 4.0 10/10/2023    PROT 7.2 10/10/2023     10/10/2023    AST 22 10/10/2023    ALT 24 10/10/2023    BUN 13 10/10/2023    ANIONGAP 17 10/10/2023    MG 1.80 12/02/2021    PHOS 3.5 12/19/2022    ALBUMIN 4.5 10/10/2023    LIPASE 28 06/23/2022    GFRMALE 85 06/26/2023     Lab Results   Component Value Date    TRIG 96 11/28/2023    CHOL 162 11/28/2023    LDLCALC 85 11/28/2023    HDL 57.8 11/28/2023     Lab Results   Component Value Date    HGBA1C 7.3 (H) 10/10/2023        Current Outpatient Medications on File Prior to Visit   Medication Sig Dispense Refill    albuterol 90 mcg/actuation inhaler Inhale 2 puffs every 4 hours if needed.      azelastine (Astelin) 137 mcg (0.1 %) nasal spray Administer 1 spray into each nostril 2 times a day. Use in each nostril as directed 30 mL 1    blood sugar diagnostic strip Use to check blood glucose up to three times daily as directed 250 each 3    budesonide-formoteroL (Symbicort) 160-4.5 mcg/actuation inhaler Inhale 2 puffs twice a day.      buPROPion XL (Wellbutrin XL) 150 mg 24 hr tablet TAKE 1 TABLET (150 MG) BY MOUTH ONCE DAILY IN THE MORNING. DO NOT CRUSH, CHEW, OR SPLIT. 90 tablet 4    dilTIAZem ER (Tiazac) 240 mg 24 hr capsule Take 1 capsule (240 mg) by mouth once daily.      empagliflozin (Jardiance) 25 mg Take 1 tablet (25 mg) by mouth once daily. 30 tablet 2    esomeprazole (NexIUM) 40 mg DR capsule Take 1 capsule (40 mg) by mouth once daily in the morning. Take before meals. 90 capsule 3    glucose 4 gram chewable tablet Chew 4 tablets (16 g) if needed for low blood sugar - see comments. 50 tablet 1    losartan (Cozaar) 25 mg tablet Take 1 tablet (25 mg) by mouth once daily.      metFORMIN XR (Glucophage-XR) 500 mg 24 hr tablet Take 2 tablets (1,000 mg) by mouth 2 times a day. TAKE 1 TABLET DAILY WITH BREAKFAST, AND TAKE 2 TABLETS DAILY WITH DINNER      ondansetron (Zofran) 4 mg tablet Take 1 tablet (4 mg) by mouth every 8 hours if needed for nausea or vomiting. 30 tablet 0    rosuvastatin (Crestor) 10 mg tablet Take 1 tablet (10 mg) by mouth once daily. 90 tablet 3    sodium,potassium,mag sulfates (Suprep Bowel Prep Kit) 17.5-3.13-1.6 gram recon soln solution Take one bottle twice as directed by the prep instructions 1 each 0    tamsulosin (Flomax) 0.4 mg 24 hr capsule Take 2 capsules (0.8 mg) by mouth once daily at bedtime. 180 capsule 1    venlafaxine XR (Effexor-XR) 75 mg 24 hr capsule TAKE 3 CAPSULES (225 MG) BY MOUTH EVERY   capsule 0     No current facility-administered medications on file prior to visit.        Assessment/Plan   Problem List Items Addressed This Visit             ICD-10-CM    Type 2 diabetes mellitus with diabetic neuropathy, without long-term current use of insulin (CMS/[facility]) E11.40     Diabetes: Uncontrolled with last A1c 7.3% on 10/10/23. Due for updated A1c (orders placed, will get when he sees PCP in March). Current regimen includes Metformin XR 500mg 2 tabs BID and Jardiance 25 mg daily. Home BG readings reported at goal. Denies hypoglycemia, though complains of some dizziness. Advised to try to measure BG during next dizziness spell to determine if this is the cause. Podiatry appointment scheduled for March. Patient advised to consult PCP regarding recent dizziness, worsening tinnitus, and difficulty sleeping when he sees her in March.    Plan:  Continue taking Metformin XR 500mg 2 tabs BID and Jardiance 25 mg daily.  Continue to monitor and record BG daily, especially during dizziness spells.          Cherelle Fragoso PharmD  Clinical Ambulatory Care Pharmacist  Ph: [phone]    Continue all meds under the continuation of care with the referring provider and clinical pharmacy team.    Clinical Pharmacist follow up: 3/8/24 or sooner as needed based on clinical intervention  Type of Encounter:  Telephone    Verbal consent to manage patient's drug therapy was obtained from the patient. They were informed they may decline to participate or withdraw from participation in pharmacy services at any time.

## 2024-02-09 NOTE — ASSESSMENT & PLAN NOTE
Diabetes: Uncontrolled with last A1c 7.3% on 10/10/23. Due for updated A1c (orders placed, will get when he sees PCP in March). Current regimen includes Metformin XR 500mg 2 tabs BID and Jardiance 25 mg daily. Home BG readings reported at goal. Denies hypoglycemia, though complains of some dizziness. Advised to try to measure BG during next dizziness spell to determine if this is the cause. Podiatry appointment scheduled for March. Patient advised to consult PCP regarding recent dizziness, worsening tinnitus, and difficulty sleeping when he sees her in March.    Plan:  Continue taking Metformin XR 500mg 2 tabs BID and Jardiance 25 mg daily.  Continue to monitor and record BG daily, especially during dizziness spells.

## 2024-02-10 PROCEDURE — 87186 SC STD MICRODIL/AGAR DIL: CPT

## 2024-02-10 PROCEDURE — 87086 URINE CULTURE/COLONY COUNT: CPT

## 2024-02-11 ENCOUNTER — LAB REQUISITION (OUTPATIENT)
Dept: LAB | Facility: HOSPITAL | Age: 69
End: 2024-02-11
Payer: MEDICARE

## 2024-02-11 DIAGNOSIS — R30.0 DYSURIA: ICD-10-CM

## 2024-02-11 DIAGNOSIS — R35.0 FREQUENCY OF MICTURITION: ICD-10-CM

## 2024-02-11 DIAGNOSIS — N39.0 URINARY TRACT INFECTION, SITE NOT SPECIFIED: ICD-10-CM

## 2024-02-13 ENCOUNTER — OFFICE VISIT (OUTPATIENT)
Dept: PRIMARY CARE | Facility: CLINIC | Age: 69
End: 2024-02-13
Payer: MEDICARE

## 2024-02-13 VITALS
DIASTOLIC BLOOD PRESSURE: 90 MMHG | HEIGHT: 73 IN | HEART RATE: 92 BPM | WEIGHT: 225 LBS | SYSTOLIC BLOOD PRESSURE: 152 MMHG | OXYGEN SATURATION: 99 % | BODY MASS INDEX: 29.82 KG/M2

## 2024-02-13 DIAGNOSIS — N30.01 ACUTE CYSTITIS WITH HEMATURIA: Primary | ICD-10-CM

## 2024-02-13 DIAGNOSIS — N39.0 URINARY TRACT INFECTION WITHOUT HEMATURIA, SITE UNSPECIFIED: ICD-10-CM

## 2024-02-13 LAB
BACTERIA UR CULT: ABNORMAL
POC APPEARANCE, URINE: CLEAR
POC BILIRUBIN, URINE: NEGATIVE
POC BLOOD, URINE: ABNORMAL
POC COLOR, URINE: YELLOW
POC GLUCOSE, URINE: ABNORMAL MG/DL
POC KETONES, URINE: NEGATIVE MG/DL
POC LEUKOCYTES, URINE: NEGATIVE
POC NITRITE,URINE: NEGATIVE
POC PH, URINE: 5.5 PH
POC PROTEIN, URINE: NEGATIVE MG/DL
POC SPECIFIC GRAVITY, URINE: 1.02
POC UROBILINOGEN, URINE: 0.2 EU/DL

## 2024-02-13 PROCEDURE — 1159F MED LIST DOCD IN RCRD: CPT | Performed by: FAMILY MEDICINE

## 2024-02-13 PROCEDURE — 4010F ACE/ARB THERAPY RXD/TAKEN: CPT | Performed by: FAMILY MEDICINE

## 2024-02-13 PROCEDURE — 3008F BODY MASS INDEX DOCD: CPT | Performed by: FAMILY MEDICINE

## 2024-02-13 PROCEDURE — 1125F AMNT PAIN NOTED PAIN PRSNT: CPT | Performed by: FAMILY MEDICINE

## 2024-02-13 PROCEDURE — 3080F DIAST BP >= 90 MM HG: CPT | Performed by: FAMILY MEDICINE

## 2024-02-13 PROCEDURE — 3077F SYST BP >= 140 MM HG: CPT | Performed by: FAMILY MEDICINE

## 2024-02-13 PROCEDURE — 81003 URINALYSIS AUTO W/O SCOPE: CPT | Performed by: FAMILY MEDICINE

## 2024-02-13 PROCEDURE — 1036F TOBACCO NON-USER: CPT | Performed by: FAMILY MEDICINE

## 2024-02-13 PROCEDURE — 99213 OFFICE O/P EST LOW 20 MIN: CPT | Performed by: FAMILY MEDICINE

## 2024-02-13 RX ORDER — NITROFURANTOIN 25; 75 MG/1; MG/1
CAPSULE ORAL
COMMUNITY
Start: 2024-02-10

## 2024-02-13 ASSESSMENT — ENCOUNTER SYMPTOMS
OCCASIONAL FEELINGS OF UNSTEADINESS: 0
DEPRESSION: 0
LOSS OF SENSATION IN FEET: 0

## 2024-02-13 ASSESSMENT — PATIENT HEALTH QUESTIONNAIRE - PHQ9
2. FEELING DOWN, DEPRESSED OR HOPELESS: NOT AT ALL
SUM OF ALL RESPONSES TO PHQ9 QUESTIONS 1 AND 2: 0
1. LITTLE INTEREST OR PLEASURE IN DOING THINGS: NOT AT ALL

## 2024-02-13 ASSESSMENT — PAIN SCALES - GENERAL: PAINLEVEL: 4

## 2024-02-13 NOTE — PROGRESS NOTES
"Subjective   Patient ID: Rui Shah is a 68 y.o. male who presents for UTI.    HPI   Having urinary frequency and dysuria for about a week.  Significantly worsened about 4 days ago with hematuria present.  Seen in urgent care 3 days ago and started on Macrobid.  Has noticed some improvement in symptoms but are ongoing.  No fever or chills.  No flank pain.      Review of Systems  Negative unless noted in HPI    Objective   /90 (BP Location: Right arm, Patient Position: Sitting, BP Cuff Size: Adult)   Pulse 92   Ht 1.854 m (6' 1\")   Wt 102 kg (225 lb)   SpO2 99%   BMI 29.69 kg/m²     Physical Exam  Constitutional:       Appearance: Normal appearance.   Abdominal:      General: Abdomen is flat.      Palpations: Abdomen is soft.      Tenderness: There is no abdominal tenderness. There is no right CVA tenderness or left CVA tenderness.   Neurological:      Mental Status: He is alert.         Assessment/Plan   Problem List Items Addressed This Visit             ICD-10-CM    Acute cystitis with hematuria - Primary N30.01     Urine culture reviewed and susceptible to Macrobid  Continue antibiotics, increase fluid intake  Will need UA once infection resolved and treated to make sure hematuria is resolved  Follow up for any changing symptoms               "

## 2024-02-13 NOTE — ASSESSMENT & PLAN NOTE
Urine culture reviewed and susceptible to Macrobid  Continue antibiotics, increase fluid intake  Will need UA once infection resolved and treated to make sure hematuria is resolved  Follow up for any changing symptoms

## 2024-02-19 ENCOUNTER — TELEPHONE (OUTPATIENT)
Dept: PRIMARY CARE | Facility: CLINIC | Age: 69
End: 2024-02-19
Payer: MEDICARE

## 2024-02-19 NOTE — TELEPHONE ENCOUNTER
Pt states that he was recently seen for an UTI and was prescribed an antibiotic  but says that he still has the UTI. Pt wants to know what should he do if he should come back in or if there is something else he can be prescribed.

## 2024-02-20 DIAGNOSIS — N30.01 ACUTE CYSTITIS WITH HEMATURIA: Primary | ICD-10-CM

## 2024-02-20 RX ORDER — CIPROFLOXACIN 250 MG/1
250 TABLET, FILM COATED ORAL 2 TIMES DAILY
Qty: 14 TABLET | Refills: 0 | Status: SHIPPED | OUTPATIENT
Start: 2024-02-20 | End: 2024-02-27

## 2024-02-22 ENCOUNTER — ANESTHESIA (OUTPATIENT)
Dept: GASTROENTEROLOGY | Facility: HOSPITAL | Age: 69
End: 2024-02-22
Payer: MEDICARE

## 2024-02-22 ENCOUNTER — APPOINTMENT (OUTPATIENT)
Dept: GASTROENTEROLOGY | Facility: HOSPITAL | Age: 69
End: 2024-02-22
Payer: MEDICARE

## 2024-02-22 ENCOUNTER — ANESTHESIA EVENT (OUTPATIENT)
Dept: GASTROENTEROLOGY | Facility: HOSPITAL | Age: 69
End: 2024-02-22
Payer: MEDICARE

## 2024-02-22 ENCOUNTER — HOSPITAL ENCOUNTER (OUTPATIENT)
Dept: GASTROENTEROLOGY | Facility: HOSPITAL | Age: 69
Setting detail: OUTPATIENT SURGERY
Discharge: HOME | End: 2024-02-22
Payer: MEDICARE

## 2024-02-22 VITALS
DIASTOLIC BLOOD PRESSURE: 76 MMHG | HEART RATE: 77 BPM | BODY MASS INDEX: 30.43 KG/M2 | SYSTOLIC BLOOD PRESSURE: 137 MMHG | RESPIRATION RATE: 15 BRPM | WEIGHT: 224.65 LBS | OXYGEN SATURATION: 97 % | TEMPERATURE: 96.8 F | HEIGHT: 72 IN

## 2024-02-22 DIAGNOSIS — Z12.11 SCREENING FOR COLORECTAL CANCER: ICD-10-CM

## 2024-02-22 DIAGNOSIS — Z12.12 SCREENING FOR COLORECTAL CANCER: ICD-10-CM

## 2024-02-22 DIAGNOSIS — R11.2 NAUSEA AND VOMITING, UNSPECIFIED VOMITING TYPE: ICD-10-CM

## 2024-02-22 LAB — GLUCOSE BLD MANUAL STRIP-MCNC: 123 MG/DL (ref 74–99)

## 2024-02-22 PROCEDURE — 7100000009 HC PHASE TWO TIME - INITIAL BASE CHARGE

## 2024-02-22 PROCEDURE — 0753T DGTZ GLS MCRSCP SLD LEVEL IV: CPT | Mod: TC,AHULAB | Performed by: INTERNAL MEDICINE

## 2024-02-22 PROCEDURE — 43235 EGD DIAGNOSTIC BRUSH WASH: CPT | Performed by: INTERNAL MEDICINE

## 2024-02-22 PROCEDURE — 2500000004 HC RX 250 GENERAL PHARMACY W/ HCPCS (ALT 636 FOR OP/ED): Performed by: INTERNAL MEDICINE

## 2024-02-22 PROCEDURE — 45385 COLONOSCOPY W/LESION REMOVAL: CPT | Performed by: INTERNAL MEDICINE

## 2024-02-22 PROCEDURE — 7100000010 HC PHASE TWO TIME - EACH INCREMENTAL 1 MINUTE

## 2024-02-22 PROCEDURE — 88305 TISSUE EXAM BY PATHOLOGIST: CPT | Performed by: PATHOLOGY

## 2024-02-22 PROCEDURE — 3700000001 HC GENERAL ANESTHESIA TIME - INITIAL BASE CHARGE

## 2024-02-22 PROCEDURE — 82947 ASSAY GLUCOSE BLOOD QUANT: CPT

## 2024-02-22 PROCEDURE — 3700000002 HC GENERAL ANESTHESIA TIME - EACH INCREMENTAL 1 MINUTE

## 2024-02-22 RX ORDER — FLUMAZENIL 0.1 MG/ML
0.2 INJECTION INTRAVENOUS ONCE AS NEEDED
Status: DISCONTINUED | OUTPATIENT
Start: 2024-02-22 | End: 2024-02-23 | Stop reason: HOSPADM

## 2024-02-22 RX ORDER — ONDANSETRON HYDROCHLORIDE 2 MG/ML
INJECTION, SOLUTION INTRAVENOUS AS NEEDED
Status: DISCONTINUED | OUTPATIENT
Start: 2024-02-22 | End: 2024-02-22

## 2024-02-22 RX ORDER — SODIUM CHLORIDE, SODIUM LACTATE, POTASSIUM CHLORIDE, CALCIUM CHLORIDE 600; 310; 30; 20 MG/100ML; MG/100ML; MG/100ML; MG/100ML
20 INJECTION, SOLUTION INTRAVENOUS CONTINUOUS
Status: CANCELLED | OUTPATIENT
Start: 2024-02-22

## 2024-02-22 RX ORDER — NALOXONE HYDROCHLORIDE 1 MG/ML
0.2 INJECTION INTRAMUSCULAR; INTRAVENOUS; SUBCUTANEOUS EVERY 5 MIN PRN
Status: DISCONTINUED | OUTPATIENT
Start: 2024-02-22 | End: 2024-02-23 | Stop reason: HOSPADM

## 2024-02-22 RX ORDER — ONDANSETRON HYDROCHLORIDE 2 MG/ML
4 INJECTION, SOLUTION INTRAVENOUS ONCE AS NEEDED
Status: DISCONTINUED | OUTPATIENT
Start: 2024-02-22 | End: 2024-02-23 | Stop reason: HOSPADM

## 2024-02-22 RX ORDER — PROPOFOL 10 MG/ML
INJECTION, EMULSION INTRAVENOUS AS NEEDED
Status: DISCONTINUED | OUTPATIENT
Start: 2024-02-22 | End: 2024-02-22

## 2024-02-22 RX ADMIN — SODIUM CHLORIDE, SODIUM LACTATE, POTASSIUM CHLORIDE, AND CALCIUM CHLORIDE: 600; 310; 30; 20 INJECTION, SOLUTION INTRAVENOUS at 08:26

## 2024-02-22 RX ADMIN — PROPOFOL 900 MG: 10 INJECTION, EMULSION INTRAVENOUS at 08:36

## 2024-02-22 RX ADMIN — ONDANSETRON 4 MG: 2 INJECTION, SOLUTION INTRAMUSCULAR; INTRAVENOUS at 08:34

## 2024-02-22 SDOH — HEALTH STABILITY: MENTAL HEALTH: CURRENT SMOKER: 0

## 2024-02-22 ASSESSMENT — PAIN SCALES - GENERAL
PAINLEVEL_OUTOF10: 0 - NO PAIN
PAIN_LEVEL: 4
PAINLEVEL_OUTOF10: 0 - NO PAIN
PAINLEVEL_OUTOF10: 0 - NO PAIN
PAINLEVEL_OUTOF10: 2

## 2024-02-22 ASSESSMENT — ENCOUNTER SYMPTOMS
ANAL BLEEDING: 0
UNEXPECTED WEIGHT CHANGE: 0
BLOOD IN STOOL: 0
CHILLS: 0
COLOR CHANGE: 0
FEVER: 0
ABDOMINAL DISTENTION: 0
CONSTIPATION: 0
NAUSEA: 0
DIARRHEA: 0
RECTAL PAIN: 0
TROUBLE SWALLOWING: 0
SHORTNESS OF BREATH: 0
VOMITING: 0
ABDOMINAL PAIN: 0

## 2024-02-22 ASSESSMENT — COLUMBIA-SUICIDE SEVERITY RATING SCALE - C-SSRS
6. HAVE YOU EVER DONE ANYTHING, STARTED TO DO ANYTHING, OR PREPARED TO DO ANYTHING TO END YOUR LIFE?: NO
1. IN THE PAST MONTH, HAVE YOU WISHED YOU WERE DEAD OR WISHED YOU COULD GO TO SLEEP AND NOT WAKE UP?: NO
2. HAVE YOU ACTUALLY HAD ANY THOUGHTS OF KILLING YOURSELF?: NO

## 2024-02-22 ASSESSMENT — PAIN - FUNCTIONAL ASSESSMENT
PAIN_FUNCTIONAL_ASSESSMENT: 0-10
PAIN_FUNCTIONAL_ASSESSMENT: 0-10
PAIN_FUNCTIONAL_ASSESSMENT: VAS (VISUAL ANALOG SCALE)
PAIN_FUNCTIONAL_ASSESSMENT: 0-10

## 2024-02-22 NOTE — ANESTHESIA POSTPROCEDURE EVALUATION
Patient: Rui Shah    Procedure Summary       Date: 02/22/24 Room / Location: [facility]    Anesthesia Start: 0830 Anesthesia Stop: 0925    Procedures:       COLONOSCOPY      EGD Diagnosis:       Screening for colorectal cancer      Nausea and vomiting, unspecified vomiting type    Scheduled Providers: Ernestina Cornell MD; Tomas Haskins MD; Indra Soliman, APRN-CNP, APRN-CRNA Responsible Provider: Tomas Haskins MD    Anesthesia Type: MAC ASA Status: 3            Anesthesia Type: MAC    Vitals Value Taken Time   /87 02/22/24 0955   Temp 36 °C (96.8 °F) 02/22/24 0921   Pulse 79 02/22/24 1000   Resp 15 02/22/24 0951   SpO2 94 % 02/22/24 1000   Vitals shown include unvalidated device data.    Anesthesia Post Evaluation    Patient location during evaluation: PACU  Patient participation: complete - patient participated  Level of consciousness: awake and alert  Pain score: 4  Pain management: adequate  Airway patency: patent  Cardiovascular status: acceptable  Respiratory status: acceptable  Hydration status: acceptable  Postoperative Nausea and Vomiting: none        No notable events documented.

## 2024-02-22 NOTE — H&P
History Of Present Illness  Rui Shah is a 68 y.o. male here for EGD and colonoscopy. EGD for N/V which has improved and GERD. Colonoscopy for f/up of polyps       Past Medical History  Past Medical History:   Diagnosis Date    Abdominal pain 02/06/2023    Acute sinusitis, unspecified 02/06/2023    Chronic pansinusitis 02/06/2023    Diabetes (CMS/HCC)     Facial pressure 02/06/2023    Persistent vomiting 02/06/2023    Personal history of other diseases of the circulatory system 12/20/2019    History of hypertension    Personal history of other diseases of the respiratory system 12/20/2019    History of asthma    Refractory sinusitis 02/06/2023    Routine general medical examination at health care facility 11/28/2023    Snoring 03/06/2023       Surgical History  Past Surgical History:   Procedure Laterality Date    ANKLE SURGERY  07/24/2018    Ankle Surgery    BACK SURGERY  07/24/2018    Back Surgery    SHOULDER SURGERY  07/24/2018    Shoulder Surgery    SINUS SURGERY  07/24/2018    Sinus Surgery        Social History  He reports that he has never smoked. He has never been exposed to tobacco smoke. He has never used smokeless tobacco. He reports that he does not drink alcohol and does not use drugs.    Family History  Family History   Problem Relation Name Age of Onset    Arthritis Mother          Allergies  Gabapentin    Review of Systems   Constitutional:  Negative for chills, fever and unexpected weight change.   HENT:  Negative for trouble swallowing.    Respiratory:  Negative for shortness of breath.    Cardiovascular:  Negative for chest pain.   Gastrointestinal:  Negative for abdominal distention, abdominal pain, anal bleeding, blood in stool, constipation, diarrhea, nausea, rectal pain and vomiting.   Skin:  Negative for color change.          Physical Exam  Vitals reviewed.   Constitutional:       General: He is awake.      Appearance: Normal appearance.   HENT:      Head: Normocephalic and atraumatic.       Nose: Nose normal.      Mouth/Throat:      Mouth: Mucous membranes are moist.   Eyes:      Pupils: Pupils are equal, round, and reactive to light.   Cardiovascular:      Rate and Rhythm: Normal rate.   Pulmonary:      Effort: Pulmonary effort is normal.   Neurological:      Mental Status: He is alert and oriented to person, place, and time. Mental status is at baseline.   Psychiatric:         Attention and Perception: Attention and perception normal.         Mood and Affect: Mood normal.         Behavior: Behavior normal.            Last Recorded Vitals  Blood pressure (!) 145/102, pulse 97, temperature 36 °C (96.8 °F), temperature source Temporal, resp. rate 18, height 1.829 m (6'), weight 102 kg (224 lb 10.4 oz), SpO2 96 %.    Relevant Results  Reviewed chart       Assessment/Plan   Proceed with EGD and colonoscopy.          Ernestina Cornell MD

## 2024-02-22 NOTE — PERIOPERATIVE NURSING NOTE
0921Patient arrived to [place] after procedure with Anesthesia and procedure RN, procedure discussed, plan reviewed, VSS    0930 wife at bedside    0940 pt tolerating snacks and PO fluids    0945 Discharge instructions discussed with patient and family at this time verbalized understanding     0952 pt off of monitors assisted w dressing per wife at bedside    0955 dr gutierrez at bedside    1017 Patient Discharged in stable condition via wheelchair to [facility]

## 2024-02-22 NOTE — DISCHARGE INSTRUCTIONS

## 2024-02-22 NOTE — ANESTHESIA PREPROCEDURE EVALUATION
Patient: Rui Shah    Procedure Information       Date/Time: 02/22/24 0830    Scheduled providers: Ernestina Cornell MD; Tomas Haskins MD; Indra Soliman, JAMEY-CNP, APRN-CRNA    Procedures:       COLONOSCOPY      EGD    Location: [facility]            Relevant Problems   Cardiovascular   (+) Benign essential hypertension   (+) Mixed hyperlipidemia      Endocrine   (+) Type 2 diabetes mellitus with diabetic neuropathy, without long-term current use of insulin (CMS/HCC)      GI   (+) Gastroesophageal reflux disease      /Renal   (+) Acute cystitis with hematuria      Neuro/Psych   (+) Depression, major, single episode, mild (CMS/HCC)   (+) Peripheral polyneuropathy      Pulmonary   (+) Mild persistent asthma without complication   (+) Obstructive sleep apnea       Clinical information reviewed:   Tobacco  Allergies  Meds   Med Hx  Surg Hx   Fam Hx  Soc Hx        NPO Detail:  NPO/Void Status  Carbohydrate Drink Given Prior to Surgery? : N  Date of Last Liquid: 02/22/24  Time of Last Liquid: 0230  Date of Last Solid: 02/21/24  Last Intake Type: Clear fluids  Time of Last Void: 0700         Physical Exam    Airway  Mallampati: I  TM distance: >3 FB  Neck ROM: full     Cardiovascular - normal exam     Dental - normal exam     Pulmonary - normal exam     Abdominal - normal exam             Anesthesia Plan    History of general anesthesia?: yes  History of complications of general anesthesia?: no    ASA 3     MAC     The patient is not a current smoker.  Patient was not previously instructed to abstain from smoking on day of procedure.  Patient did not smoke on day of procedure.    intravenous induction   Postoperative administration of opioids is intended.  Anesthetic plan and risks discussed with patient.  Use of blood products discussed with patient who.    Plan discussed with CAA.

## 2024-02-27 LAB
LABORATORY COMMENT REPORT: NORMAL
PATH REPORT.FINAL DX SPEC: NORMAL
PATH REPORT.GROSS SPEC: NORMAL
PATH REPORT.TOTAL CANCER: NORMAL

## 2024-02-29 ENCOUNTER — OFFICE VISIT (OUTPATIENT)
Dept: ORTHOPEDIC SURGERY | Facility: CLINIC | Age: 69
End: 2024-02-29
Payer: MEDICARE

## 2024-02-29 ENCOUNTER — HOSPITAL ENCOUNTER (OUTPATIENT)
Dept: RADIOLOGY | Facility: CLINIC | Age: 69
Discharge: HOME | End: 2024-02-29
Payer: MEDICARE

## 2024-02-29 DIAGNOSIS — M25.871 IMPINGEMENT SYNDROME OF RIGHT ANKLE: Primary | ICD-10-CM

## 2024-02-29 DIAGNOSIS — S82.891D CLOSED FRACTURE OF RIGHT ANKLE WITH ROUTINE HEALING, SUBSEQUENT ENCOUNTER: ICD-10-CM

## 2024-02-29 DIAGNOSIS — M25.571 ACUTE RIGHT ANKLE PAIN: ICD-10-CM

## 2024-02-29 PROCEDURE — 73610 X-RAY EXAM OF ANKLE: CPT | Mod: RIGHT SIDE | Performed by: RADIOLOGY

## 2024-02-29 PROCEDURE — 1125F AMNT PAIN NOTED PAIN PRSNT: CPT | Performed by: ORTHOPAEDIC SURGERY

## 2024-02-29 PROCEDURE — 73590 X-RAY EXAM OF LOWER LEG: CPT | Mod: RIGHT SIDE | Performed by: RADIOLOGY

## 2024-02-29 PROCEDURE — 1036F TOBACCO NON-USER: CPT | Performed by: ORTHOPAEDIC SURGERY

## 2024-02-29 PROCEDURE — 99213 OFFICE O/P EST LOW 20 MIN: CPT | Performed by: ORTHOPAEDIC SURGERY

## 2024-02-29 PROCEDURE — 4010F ACE/ARB THERAPY RXD/TAKEN: CPT | Performed by: ORTHOPAEDIC SURGERY

## 2024-02-29 PROCEDURE — 73590 X-RAY EXAM OF LOWER LEG: CPT | Mod: RT

## 2024-02-29 PROCEDURE — 73610 X-RAY EXAM OF ANKLE: CPT | Mod: RT

## 2024-02-29 PROCEDURE — 3008F BODY MASS INDEX DOCD: CPT | Performed by: ORTHOPAEDIC SURGERY

## 2024-02-29 PROCEDURE — 1159F MED LIST DOCD IN RCRD: CPT | Performed by: ORTHOPAEDIC SURGERY

## 2024-02-29 NOTE — PROGRESS NOTES
Chief complaint my right ankle still sores but I I get used to it.    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  He appears to be doing better as far as his gait training gait but he still has pain and soreness.    His physical examination reveals dorsiflexion to about 7 to 8 degrees plantarflexion of about 25 degrees swelling is much improved overall alignment looks quite good.    X-rays show right distal tibia and fibular osteotomy to be healed excellent callus formation good overall alignment on the AP and lateral no evidence of hardware failure or total ankle replacement failure.    Assessment distal tibia and fibula corrective osteotomy for total ankle replacement now with better motion better alignment but still has some chronic pain.    Plan I talked to the patient is not really anything else I can really offer.  I suggested he follow-up again with Dr. Amador about the total ankle replacement and that arthroscopy may be an option for him.  Right now he is not interested in any surgery he says he is doing well.  He will follow-up with me as needed

## 2024-03-01 ENCOUNTER — OFFICE VISIT (OUTPATIENT)
Dept: PRIMARY CARE | Facility: CLINIC | Age: 69
End: 2024-03-01
Payer: MEDICARE

## 2024-03-01 VITALS
OXYGEN SATURATION: 97 % | HEART RATE: 85 BPM | DIASTOLIC BLOOD PRESSURE: 80 MMHG | BODY MASS INDEX: 30.48 KG/M2 | HEIGHT: 72 IN | SYSTOLIC BLOOD PRESSURE: 110 MMHG | WEIGHT: 225 LBS

## 2024-03-01 DIAGNOSIS — K21.9 GASTROESOPHAGEAL REFLUX DISEASE, UNSPECIFIED WHETHER ESOPHAGITIS PRESENT: ICD-10-CM

## 2024-03-01 DIAGNOSIS — F32.0 DEPRESSION, MAJOR, SINGLE EPISODE, MILD (CMS-HCC): ICD-10-CM

## 2024-03-01 DIAGNOSIS — E11.9 TYPE 2 DIABETES MELLITUS WITHOUT COMPLICATION, WITHOUT LONG-TERM CURRENT USE OF INSULIN (MULTI): ICD-10-CM

## 2024-03-01 LAB — POC HEMOGLOBIN A1C: 9.3 % (ref 4.2–6.5)

## 2024-03-01 PROCEDURE — 3008F BODY MASS INDEX DOCD: CPT | Performed by: FAMILY MEDICINE

## 2024-03-01 PROCEDURE — 3074F SYST BP LT 130 MM HG: CPT | Performed by: FAMILY MEDICINE

## 2024-03-01 PROCEDURE — 3079F DIAST BP 80-89 MM HG: CPT | Performed by: FAMILY MEDICINE

## 2024-03-01 PROCEDURE — 4010F ACE/ARB THERAPY RXD/TAKEN: CPT | Performed by: FAMILY MEDICINE

## 2024-03-01 PROCEDURE — 83036 HEMOGLOBIN GLYCOSYLATED A1C: CPT | Performed by: FAMILY MEDICINE

## 2024-03-01 PROCEDURE — 1125F AMNT PAIN NOTED PAIN PRSNT: CPT | Performed by: FAMILY MEDICINE

## 2024-03-01 PROCEDURE — 1159F MED LIST DOCD IN RCRD: CPT | Performed by: FAMILY MEDICINE

## 2024-03-01 PROCEDURE — 99214 OFFICE O/P EST MOD 30 MIN: CPT | Performed by: FAMILY MEDICINE

## 2024-03-01 PROCEDURE — 1036F TOBACCO NON-USER: CPT | Performed by: FAMILY MEDICINE

## 2024-03-01 RX ORDER — METFORMIN HYDROCHLORIDE 500 MG/1
1000 TABLET, EXTENDED RELEASE ORAL 2 TIMES DAILY
Qty: 360 TABLET | Refills: 1 | Status: SHIPPED | OUTPATIENT
Start: 2024-03-01

## 2024-03-01 RX ORDER — GLIMEPIRIDE 2 MG/1
2 TABLET ORAL
Qty: 90 TABLET | Refills: 3 | Status: SHIPPED | OUTPATIENT
Start: 2024-03-01 | End: 2025-03-01

## 2024-03-01 RX ORDER — ESOMEPRAZOLE MAGNESIUM 40 MG/1
40 CAPSULE, DELAYED RELEASE ORAL
Qty: 90 CAPSULE | Refills: 3 | Status: SHIPPED | OUTPATIENT
Start: 2024-03-01

## 2024-03-01 RX ORDER — VENLAFAXINE HYDROCHLORIDE 75 MG/1
CAPSULE, EXTENDED RELEASE ORAL
Qty: 270 CAPSULE | Refills: 1 | Status: SHIPPED | OUTPATIENT
Start: 2024-03-01

## 2024-03-01 ASSESSMENT — PATIENT HEALTH QUESTIONNAIRE - PHQ9
SUM OF ALL RESPONSES TO PHQ9 QUESTIONS 1 AND 2: 0
1. LITTLE INTEREST OR PLEASURE IN DOING THINGS: NOT AT ALL
2. FEELING DOWN, DEPRESSED OR HOPELESS: NOT AT ALL

## 2024-03-01 ASSESSMENT — ENCOUNTER SYMPTOMS
OCCASIONAL FEELINGS OF UNSTEADINESS: 0
LOSS OF SENSATION IN FEET: 0
DEPRESSION: 0

## 2024-03-01 NOTE — PROGRESS NOTES
Subjective   Patient ID: Rui Shah is a 68 y.o. male who presents for Follow-up.    HPI   DMII: reports that AM sugars are ranging 120-140s, does not check regularly otherwise, currently taking Metformin and Jardiance.  Was not able to tolerate GLP-1 secondary to significant nausea.  Denies any current issues.    UTI: resolved with use of Cipro    Bilateral ankle pain persists, saw orthopedics today, not much to be done    Review of Systems  Negative unless noted in HPI    Objective   /80 (BP Location: Right arm, Patient Position: Sitting, BP Cuff Size: Adult)   Pulse 85   Ht 1.829 m (6')   Wt 102 kg (225 lb)   SpO2 97%   BMI 30.52 kg/m²     Physical Exam  Constitutional:       Appearance: He is normal weight.   Cardiovascular:      Rate and Rhythm: Normal rate and regular rhythm.   Pulmonary:      Effort: Pulmonary effort is normal.      Breath sounds: Normal breath sounds.   Musculoskeletal:      Cervical back: Normal range of motion.   Neurological:      General: No focal deficit present.      Mental Status: He is alert and oriented to person, place, and time.         Assessment/Plan   Problem List Items Addressed This Visit             ICD-10-CM    Type 2 diabetes mellitus without complication, without long-term current use of insulin (CMS/HCC) E11.9     A1C worsened since stopping GLP-1  Add Glimepiride at this time to help with management  Continue Jardiance and Metformin  Labs up to date otherwise  Continue to work on diet, activity  Follow up in 3 months         Relevant Medications    metFORMIN  mg 24 hr tablet    glimepiride (AmaryL) 2 mg tablet    Other Relevant Orders    POCT glycosylated hemoglobin (Hb A1C) manually resulted (Completed)    Depression, major, single episode, mild (CMS/HCC) F32.0    Relevant Medications    venlafaxine XR (Effexor-XR) 75 mg 24 hr capsule    Gastroesophageal reflux disease K21.9    Relevant Medications    esomeprazole (NexIUM) 40 mg DR capsule

## 2024-03-01 NOTE — ASSESSMENT & PLAN NOTE
A1C worsened since stopping GLP-1  Add Glimepiride at this time to help with management  Continue Jardiance and Metformin  Labs up to date otherwise  Continue to work on diet, activity  Follow up in 3 months

## 2024-03-05 ENCOUNTER — OFFICE VISIT (OUTPATIENT)
Dept: PODIATRY | Facility: CLINIC | Age: 69
End: 2024-03-05
Payer: MEDICARE

## 2024-03-05 DIAGNOSIS — G62.9 PERIPHERAL POLYNEUROPATHY: ICD-10-CM

## 2024-03-05 DIAGNOSIS — M79.671 PAIN IN BOTH FEET: ICD-10-CM

## 2024-03-05 DIAGNOSIS — L84 CORNS AND CALLOSITIES: ICD-10-CM

## 2024-03-05 DIAGNOSIS — E11.9 TYPE 2 DIABETES MELLITUS WITHOUT COMPLICATION, WITHOUT LONG-TERM CURRENT USE OF INSULIN (MULTI): Primary | ICD-10-CM

## 2024-03-05 DIAGNOSIS — M79.672 PAIN IN BOTH FEET: ICD-10-CM

## 2024-03-05 PROCEDURE — 1160F RVW MEDS BY RX/DR IN RCRD: CPT | Performed by: PODIATRIST

## 2024-03-05 PROCEDURE — 99203 OFFICE O/P NEW LOW 30 MIN: CPT | Performed by: PODIATRIST

## 2024-03-05 PROCEDURE — 3008F BODY MASS INDEX DOCD: CPT | Performed by: PODIATRIST

## 2024-03-05 PROCEDURE — 1159F MED LIST DOCD IN RCRD: CPT | Performed by: PODIATRIST

## 2024-03-05 PROCEDURE — 1036F TOBACCO NON-USER: CPT | Performed by: PODIATRIST

## 2024-03-05 PROCEDURE — 1125F AMNT PAIN NOTED PAIN PRSNT: CPT | Performed by: PODIATRIST

## 2024-03-05 PROCEDURE — 4010F ACE/ARB THERAPY RXD/TAKEN: CPT | Performed by: PODIATRIST

## 2024-03-05 NOTE — PROGRESS NOTES
Patient states they are here for Dm exam  Denies NTB to feet  Most recent A1C is 7.3  Has callous to area  Tender when walking    Past Medical History  Past Medical History:   Diagnosis Date    Abdominal pain 02/06/2023    Acute sinusitis, unspecified 02/06/2023    Chronic pansinusitis 02/06/2023    Diabetes (CMS/HCC)     Facial pressure 02/06/2023    Persistent vomiting 02/06/2023    Personal history of other diseases of the circulatory system 12/20/2019    History of hypertension    Personal history of other diseases of the respiratory system 12/20/2019    History of asthma    Refractory sinusitis 02/06/2023    Routine general medical examination at [facility] facility 11/28/2023    Snoring 03/06/2023       Medications and Allergies have been reviewed.    Review Of Systems:  GENERAL: No weight loss, malaise or fevers.  HEENT: Negative for frequent or significant headaches,   RESPIRATORY: Negative for cough, wheezing or shortness of breath.  CARDIOVASCULAR: Negative for chest pain, leg swelling or palpitations.    Examination of Both Lower Extremities:   Objective:   Vasc: DP and PT pulses are palpable bilateral.  CFT is less than 3 seconds bilateral.  Skin temperature is warm to cool proximal to distal bilateral.      Neuro: Vibratory, light touch and proprioception are intact bilateral.      Derm: Nails 1-5 bilateral are intact.  HPK to left sub 5th met head. Debrided hpk tissue.   No open wound noted.     Ortho: Muscle strength is 5/5 for all pedal groups tested.      1. Type 2 diabetes mellitus without complication, without long-term current use of insulin (CMS/[facility])        2. Peripheral polyneuropathy  Referral to Podiatry      3. Pain in both feet        4. Corns and callosities            Patient educated on proper diabetic foot care.  A1C revd. 7.3  All hpk tissue was debrided to smooth skin  Discussed wearing supportive shoes.  Patient to follow up in 6 mos or sooner if any problems arise.   Patient was  in agreement to this plan. All questions answered.      Guadalupe Leonard DPM  [phone]  Option 2  Fax: [phone]

## 2024-03-06 NOTE — CONSULTS
Service:   Service: Anesthesia - Pain     Consult:  Consult requested by (Attending Name): Dr. Kaur   Reason: Post op pain     History of Present Illness:   HPI:    ARIANA CARPENTER is a 67 year old Male  who presents for Right ankle closing wedge osteotomy with open fixation using plates and screws with Dr. Kaur. Acute Pain  consulted for block for postoperative pain control.     Anticipated Postop Pain Issues -   Palliative: typically relieved with IV analgesics and regional local anesthetics  Provocative: typically with movement  Quality: typically burning and aching  Radiation: typically none  Severity: typically severe 8-10/10  Timing: typically constant    ROS:   Constitutional: No fevers, no rash, no chills  Cardiovascular: No chest pain, no syncope.   Respiratory: No cough, shortness of breath  GI: No abdominal pain, no nausea, no vomiting. No diarrhea.   Vascular: No lower extremity pain.   Neuro: No seizures, weakness, or other neuro symptoms.  : No urinary symptoms. No burning with urination or incontinence.   MSK: Pain.   Endocrine: No evidence of heat intolerance, or cold intolerance or other endocrine symptoms:  Hematologic: No easy bleeding, bruising, petechiae, or purpura.   Skin: No rash or itching.    PMH:  BPH, NIDDM, Depression, HTN, HLD, asthma, Mitral regurgitation.     PSH:  Hernia repair, sinus surgery, spine surgery, right ankle surgery    FHx: Non contributory      SHx:  No smoking, occasional ETOH, uses THC gummies    Allergies: Gabapentin           Allergies:  ·  gabapentin : Facial Swelling  ·  gabapentin  containing compounds: Unknown, [facility]    Objective:   Physical Exam Narrative:  ·  Physical Exam:    General: no acute distress, AAOx3  HEENT: NCAT, MMM  CV: RRR   Pulm: normal respiratory effort, CTAB  GI: soft, nontender, nondistended  Extremities: no LE edema, 2+ radial and DP pulses  Neuro: moves all extremities equally, CN II - XII grossly intact  Psych: normal mood and  affect  Skin: warm, dry, intact.      Assessment:    ARIANA CARPENTER is a 67 year old Male  who presents for Right ankle closing wedge osteotomy with open fixation using plates and screws with Dr. Kaur. Acute Pain  consulted for block for postoperative pain control.     - Popliteal and saphenous single injection nerve blocks performed preoperatively on 6/30/23  - Duration of block expected to be about 16-24 hours  - Additional pain medications per primary team  - APS will see on POD1 if inpatient      Acute Pain Resident  pg [phone] ph [phone].    Attestation:   Note Completion:  I am a:  Resident/Fellow   Attending Attestation I saw and evaluated the patient.  I personally obtained the key and critical portions of the history and physical exam or was physically present for key and  critical portions performed by the resident/fellow. I reviewed the resident/fellow?s documentation and discussed the patient with the resident/fellow.  I agree with the resident/fellow?s medical decision making as documented in the note.     I personally evaluated the patient on 30-Jun-2023         Electronic Signatures:  Jose Vargas (Resident))  (Signed 30-Jun-2023 07:08)   Authored: Service, History of Present Illness, Allergies,  Objective, Assessment/Recommendations, Note Completion  Carley Rivera)  (Signed 30-Jun-2023 07:39)   Authored: Note Completion   Co-Signer: Service, History of Present Illness, Allergies, Objective, Assessment/Recommendations, Note Completion      Last Updated: 30-Jun-2023 07:39 by Carley Rivera)

## 2024-03-08 ENCOUNTER — TELEMEDICINE (OUTPATIENT)
Dept: PHARMACY | Facility: HOSPITAL | Age: 69
End: 2024-03-08
Payer: MEDICARE

## 2024-03-08 DIAGNOSIS — E11.9 TYPE 2 DIABETES MELLITUS WITHOUT COMPLICATION, WITHOUT LONG-TERM CURRENT USE OF INSULIN (MULTI): Primary | ICD-10-CM

## 2024-03-08 DIAGNOSIS — E11.40 TYPE 2 DIABETES MELLITUS WITH DIABETIC NEUROPATHY, WITHOUT LONG-TERM CURRENT USE OF INSULIN (MULTI): ICD-10-CM

## 2024-03-08 NOTE — PROGRESS NOTES
[facility] Pharmacist Visit - Diabetes Management  Referring Provider: Juliane Guzman MD    Subjective   Rui Shah is a 68 y.o. male who presents for Diabetes. Still having bouts of nausea and vomiting. Had an appointment last month with GI. Seeing them again in February.     HPI  PMH significant for T2DM, HTN, GERD, depression, asthma, HLD, and MARIELA. .  Patient has no known history of medullary thyroid cancer, pancreatitis, or complicated UTI.  Diagnosed 6-7 years ago. Known DM complications include peripheral neuropathy.  Special needs/barriers to therapy: none      Diabetes Management  Current Medications: Metformin XR 500mg 2 tabs in the morning and 2 tabs in the evening, Jardiance 25 mg daily, glimepiride 2mg daily in the evening  Previous Medications: Trulicity (vomiting)     Adherence: Takes medication as directed and reports no missed doses  Adverse Effects: None      Risk Reducing Meds  On ACEi/ARB: Yes   On SGLT2i: Yes   On GLP1-RA: No (vomiting)  On Statin: Yes     Glucose Monitoring  Glucometer/CGM Type: One Touch Ultra 2     Previous home BG readings: (2/9/24) reports highest reading to date being 138   Current home BG readings:  -180    Hypoglycemia: Patient denies signs and symptoms and No BG readings < 80 mg/dL.  Hyperglycemia: Polyuria (frequent urination) and Polydipsia (frequent thirst)    Diabetic Eye Exam: Needs completed, last summer 2022  Monofilament Foot Exam: Up to date, completed 3/5/24 - following w/ podiatry      Secondary Prevention  ASCVD Risk:   The 10-year ASCVD risk score (Lamin LORENZ, et al., 2019) is: 19.8%    Values used to calculate the score:      Age: 68 years      Sex: Male      Is Non- : No      Diabetic: Yes      Tobacco smoker: No      Systolic Blood Pressure: 110 mmHg      Is BP treated: Yes      HDL Cholesterol: 57.8 mg/dL      Total Cholesterol: 162 mg/dL  On Statin?: Yes, rosuvastatin 10 mg    Immunizations Needed: Annual Flu, RSV, Prevnar,  Shingrix, Tdap, and Hepatitis B Series  Tobacco Use: non-smoker    Lifestyle  Diet: 3 meals/day. Trying to watch food labels. No recent changes.  Physical Activity: Minimal. Would like to exercise, but has significant pain/aches after excessive movement.    Objective   Vitals  BP Readings from Last 2 Encounters:   03/01/24 110/80   02/22/24 137/76     BMI Readings from Last 1 Encounters:   03/01/24 30.52 kg/m²      Labs  A1C  Lab Results   Component Value Date    HGBA1C 9.3 (A) 03/01/2024    HGBA1C 7.3 (H) 10/10/2023    HGBA1C 9.6 (A) 06/26/2023     BMP  Lab Results   Component Value Date    CALCIUM 9.6 10/10/2023     10/10/2023    K 4.0 10/10/2023    CO2 22 10/10/2023     10/10/2023    BUN 13 10/10/2023    CREATININE 0.79 10/10/2023    GFRMALE 85 06/26/2023     LFTs  Lab Results   Component Value Date    ALT 24 10/10/2023    AST 22 10/10/2023    ALKPHOS 135 10/10/2023    BILITOT 0.4 10/10/2023     FLP  Lab Results   Component Value Date    TRIG 96 11/28/2023    CHOL 162 11/28/2023    LDLF 69 12/19/2022    HDL 57.8 11/28/2023     Urine Microalbumin  Lab Results   Component Value Date    MICROALBCREA 201.6 (H) 11/28/2023     Vitamin B12  Lab Results   Component Value Date    DYGWOKCQ10 350 11/28/2023         Assessment/Plan   Problem List Items Addressed This Visit       Type 2 diabetes mellitus without complication, without long-term current use of insulin (CMS/[facility]) - Primary     Diabetes: Uncontrolled with last A1c 9.3% on 3/1/24, increased from previous. Current regimen includes Metformin XR 500mg 2 tabs in the morning and 2 tabs in the evening, Jardiance 25 mg daily, glimepiride 2mg daily in the evening (started 3 days ago). Tolerating well, patient counseled to take glimepiride in the morning with breakfast to avoid hypoglycemia. Home BG readings reported as FBG ranging 110-180. Due to recent addition of glimepiride, plan to continue current regimen and follow-up in 2-3 weeks.  Continue taking  glimepiride 2mg, change timing to every morning with breakfast  Continue taking Metformin XR 500mg 2 tabs in the morning and 2 tabs in the evening and Jardiance 25 mg daily  Continue to monitor and record FBG daily         Relevant Orders    Follow Up In Advanced Primary Care - Pharmacy     Other Visit Diagnoses       Type 2 diabetes mellitus with diabetic neuropathy, without long-term current use of insulin (CMS/[facility])              Patient Education:  Counseled patient on relevant MOA, expectations, side effects, duration of therapy, contraindications, administration, and monitoring parameters.  All questions and concerns addressed. Contact pharmacist with any further questions or concerns prior to next appointment.  Reviewed BG goals: Fasting BG goal , Postprandial BG goal <180 mg/dL, A1C goal <7%  Reviewed signs, symptoms, and treatment of hypoglycemia.    Clinical Pharmacist follow-up: 3/28/24 at 1:30pm, Telehealth visit  Next PCP appointment: 3/1/24    Gini Lockhart, PharmD  Clinical Pharmacist  03/08/24    Continue all meds under the continuation of care with the referring provider and clinical pharmacy team.  Verbal consent to manage patient's drug therapy was obtained from the patient. They were informed they may decline to participate or withdraw from participation in pharmacy services at any time.

## 2024-03-08 NOTE — ASSESSMENT & PLAN NOTE
Diabetes: Uncontrolled with last A1c 9.3% on 3/1/24, increased from previous. Current regimen includes Metformin XR 500mg 2 tabs in the morning and 2 tabs in the evening, Jardiance 25 mg daily, glimepiride 2mg daily in the evening (started 3 days ago). Tolerating well, patient counseled to take glimepiride in the morning with breakfast to avoid hypoglycemia. Home BG readings reported as FBG ranging 110-180. Due to recent addition of glimepiride, plan to continue current regimen and follow-up in 2-3 weeks.  Continue taking glimepiride 2mg, change timing to every morning with breakfast  Continue taking Metformin XR 500mg 2 tabs in the morning and 2 tabs in the evening and Jardiance 25 mg daily  Continue to monitor and record FBG daily

## 2024-03-24 DIAGNOSIS — E11.40 TYPE 2 DIABETES MELLITUS WITH DIABETIC NEUROPATHY, WITHOUT LONG-TERM CURRENT USE OF INSULIN (MULTI): ICD-10-CM

## 2024-03-25 RX ORDER — EMPAGLIFLOZIN 25 MG/1
25 TABLET, FILM COATED ORAL DAILY
Qty: 90 TABLET | Refills: 0 | Status: SHIPPED | OUTPATIENT
Start: 2024-03-25

## 2024-03-28 ENCOUNTER — TELEMEDICINE (OUTPATIENT)
Dept: PHARMACY | Facility: HOSPITAL | Age: 69
End: 2024-03-28
Payer: MEDICARE

## 2024-03-28 DIAGNOSIS — E11.9 TYPE 2 DIABETES MELLITUS WITHOUT COMPLICATION, WITHOUT LONG-TERM CURRENT USE OF INSULIN (MULTI): ICD-10-CM

## 2024-03-28 NOTE — PROGRESS NOTES
[facility] Pharmacist Visit - Diabetes Management  Referring Provider: Juliane Guzman MD    Subjective   Rui Shah is a 68 y.o. male who presents for Diabetes. Still having bouts of nausea and vomiting. Had an appointment last month with GI. Seeing them again in February.     HPI  PMH significant for T2DM, HTN, GERD, depression, asthma, HLD, and MARIELA. .  Patient has no known history of medullary thyroid cancer, pancreatitis, or complicated UTI.  Diagnosed 6-7 years ago. Known DM complications include peripheral neuropathy.  Special needs/barriers to therapy: none      Diabetes Management  Current Medications: Metformin XR 500mg 2 tabs in the morning and 2 tabs in the evening, Jardiance 25 mg daily, glimepiride 2mg every morning  Previous Medications: Trulicity (vomiting)     Adherence: Takes medication as directed and reports no missed doses  Adverse Effects: None    Risk Reducing Meds  On ACEi/ARB: Yes   On SGLT2i: Yes   On GLP1-RA: No (vomiting)  On Statin: Yes     Glucose Monitoring  Glucometer/CGM Type: One Touch Ultra 2     Previous home BG readings: (3/8/24)  -180  Current home BG readings: FBG , typically 90's, BG in 70's ~1x per week    Hypoglycemia:  No known BG readings <70, but patient notes occasionally experiencing racing heart in the evening. Has not been able to check BG at these times.  Hyperglycemia: Denies signs and symptoms    Diabetic Eye Exam: Needs completed, last summer 2022  Monofilament Foot Exam: Following with podiatry    Lifestyle  Diet: 3 meals/day. Trying to watch food labels. No recent changes.  Physical Activity: Minimal. Would like to exercise, but has significant pain/aches after excessive movement.    Secondary Prevention  ASCVD Risk:   The 10-year ASCVD risk score (Lamin LORENZ, et al., 2019) is: 19.8%    Values used to calculate the score:      Age: 68 years      Sex: Male      Is Non- : No      Diabetic: Yes      Tobacco smoker: No      Systolic Blood  Pressure: 110 mmHg      Is BP treated: Yes      HDL Cholesterol: 57.8 mg/dL      Total Cholesterol: 162 mg/dL  On Statin?: Yes, rosuvastatin 10 mg    Immunizations Needed: Annual Flu, RSV, Prevnar, Shingrix, Tdap, and Hepatitis B Series  Tobacco Use: non-smoker      Objective   Vitals  BP Readings from Last 2 Encounters:   03/01/24 110/80   02/22/24 137/76     BMI Readings from Last 1 Encounters:   03/01/24 30.52 kg/m²      Labs  A1C  Lab Results   Component Value Date    HGBA1C 9.3 (A) 03/01/2024    HGBA1C 7.3 (H) 10/10/2023    HGBA1C 9.6 (A) 06/26/2023     BMP  Lab Results   Component Value Date    CALCIUM 9.6 10/10/2023     10/10/2023    K 4.0 10/10/2023    CO2 22 10/10/2023     10/10/2023    BUN 13 10/10/2023    CREATININE 0.79 10/10/2023    GFRMALE 85 06/26/2023     LFTs  Lab Results   Component Value Date    ALT 24 10/10/2023    AST 22 10/10/2023    ALKPHOS 135 10/10/2023    BILITOT 0.4 10/10/2023     FLP  Lab Results   Component Value Date    TRIG 96 11/28/2023    CHOL 162 11/28/2023    LDLF 69 12/19/2022    HDL 57.8 11/28/2023     Urine Microalbumin  Lab Results   Component Value Date    MICROALBCREA 201.6 (H) 11/28/2023     Vitamin B12  Lab Results   Component Value Date    TDQJHTXH11 350 11/28/2023         Assessment/Plan   Problem List Items Addressed This Visit       Type 2 diabetes mellitus without complication, without long-term current use of insulin (CMS/[facility])     Diabetes: Uncontrolled with last A1c 9.3% on 3/1/24, increased from previous. Current regimen includes Metformin XR 500mg 2 tabs in the morning and 2 tabs in the evening, Jardiance 25 mg daily, glimepiride 2mg daily every morning. Home BG readings FBG ranges , typically 90's. No known hypoglycemia, but patient notes racing heart in evening occasionally. Advised to check BG at these times to determine if related to hypoglycemia. Due to FBG mostly in goal range, plan to continue current regimen and follow-up in 4  weeks.  Continue taking Metformin XR 500mg 2 tabs in the morning and 2 tabs in the evening, Jardiance 25 mg daily, glimepiride 2mg daily every morning.  Continue to monitor and record FBG daily.  Check BG if experiencing any signs of hypoglycemia, including racing heart.         Relevant Orders    Follow Up In Advanced Primary Care - Pharmacy     Patient Education:  Counseled patient on relevant MOA, expectations, side effects, duration of therapy, contraindications, administration, and monitoring parameters.  All questions and concerns addressed. Contact pharmacist with any further questions or concerns prior to next appointment.  Reviewed BG goals: Fasting BG goal , Postprandial BG goal <180 mg/dL, A1C goal <7%  Reviewed signs, symptoms, and treatment of hypoglycemia.    Clinical Pharmacist follow-up: 4/25/24 at 1:30pm, Telehealth visit  Next PCP appointment: Not scheduled    Gini Lockhart, PharmD  Clinical Pharmacist  03/28/24    Continue all meds under the continuation of care with the referring provider and clinical pharmacy team.  Verbal consent to manage patient's drug therapy was obtained from the patient. They were informed they may decline to participate or withdraw from participation in pharmacy services at any time.

## 2024-03-28 NOTE — ASSESSMENT & PLAN NOTE
Diabetes: Uncontrolled with last A1c 9.3% on 3/1/24, increased from previous. Current regimen includes Metformin XR 500mg 2 tabs in the morning and 2 tabs in the evening, Jardiance 25 mg daily, glimepiride 2mg daily every morning. Home BG readings FBG ranges , typically 90's. No known hypoglycemia, but patient notes racing heart in evening occasionally. Advised to check BG at these times to determine if related to hypoglycemia. Due to FBG mostly in goal range, plan to continue current regimen and follow-up in 4 weeks.  Continue taking Metformin XR 500mg 2 tabs in the morning and 2 tabs in the evening, Jardiance 25 mg daily, glimepiride 2mg daily every morning.  Continue to monitor and record FBG daily.  Check BG if experiencing any signs of hypoglycemia, including racing heart.

## 2024-04-25 ENCOUNTER — TELEMEDICINE (OUTPATIENT)
Dept: PHARMACY | Facility: HOSPITAL | Age: 69
End: 2024-04-25
Payer: MEDICARE

## 2024-04-25 DIAGNOSIS — E11.9 TYPE 2 DIABETES MELLITUS WITHOUT COMPLICATION, WITHOUT LONG-TERM CURRENT USE OF INSULIN (MULTI): ICD-10-CM

## 2024-04-25 NOTE — ASSESSMENT & PLAN NOTE
Diabetes: Uncontrolled with last A1c 9.3% on 3/1/24. Current regimen includes Metformin XR 500mg 2 tabs in the morning and 2 tabs in the evening, Jardiance 25 mg daily, and glimepiride 2mg every morning. Home BG readings FBG . Due to FBG at goal, plan to continue current regimen and follow-up in 3 months.  Continue taking Metformin XR 500mg 2 tabs in the morning and 2 tabs in the evening, Jardiance 25 mg daily, glimepiride 2mg daily every morning.  Continue to monitor and record FBG daily.  Check BG if experiencing any signs of hypoglycemia, including racing heart.

## 2024-04-25 NOTE — PROGRESS NOTES
[facility] Pharmacist Visit - Diabetes Management  Rui Shah is a 68 y.o. male was referred to Clinical Pharmacy Team for Diabetes.    Referring Provider: Juliane Guzman MD  PCP: Juliane Guzman MD - last visit: 3/1/24, next visit: Not scheduled    Subjective   HPI  PMH significant for T2DM, HTN, GERD, depression, asthma, HLD, and MARIELA.   Patient has no known history of medullary thyroid cancer, pancreatitis, or complicated UTI.  Known DM complications include peripheral neuropathy.  Special needs/barriers to therapy: None identified      DIABETES ASSESSMENT  Medication Therapy  Current Medications: Metformin XR 500mg 2 tabs in the morning and 2 tabs in the evening, Jardiance 25 mg daily, glimepiride 2mg every morning   Previous Medications: Trulicity (vomiting)     Adherence: Takes medication as directed and reports no missed doses  Adverse Effects: None    Risk Reducing Meds  On GLP1-RA: No (vomiting)   On SGLT2i: Yes   On ACEi/ARB: Yes   On Statin: Yes     Glucose Monitoring  Glucometer/CGM Type: One Touch Ultra 2      Previous home BG readings: (3/28/24) FBG , typically 90's, BG in 70's ~1x per week   Current home BG readings: FBG      Hypoglycemia: No BG readings < 80 mg/dL, reports one instance of heart racing but was unable to check BG at that time.  Hyperglycemia: Denies signs and symptoms    Diabetic Eye Exam: Needs completed, last summer 2022  Monofilament Foot Exam:  Following with podiatry    Lifestyle  Diet: 3 meals/day. Trying to watch food labels. No recent changes.  Physical Activity: Has been going outside and walking in yard recently      Secondary Prevention  ASCVD Risk:   The 10-year ASCVD risk score (Lamin LORENZ, et al., 2019) is: 19.8%    Values used to calculate the score:      Age: 68 years      Sex: Male      Is Non- : No      Diabetic: Yes      Tobacco smoker: No      Systolic Blood Pressure: 110 mmHg      Is BP treated: Yes      HDL Cholesterol: 57.8 mg/dL       Total Cholesterol: 162 mg/dL  On Statin?: Yes, rosuvastatin 10 mg    Immunizations Needed: RSV, Shingrix, and Tdap  Tobacco Use: non-smoker      Objective   Vitals  BP Readings from Last 2 Encounters:   03/01/24 110/80   02/22/24 137/76     BMI Readings from Last 1 Encounters:   03/01/24 30.52 kg/m²      Labs  A1C  Lab Results   Component Value Date    HGBA1C 9.3 (A) 03/01/2024    HGBA1C 7.3 (H) 10/10/2023    HGBA1C 9.6 (A) 06/26/2023     BMP  Lab Results   Component Value Date    CALCIUM 9.6 10/10/2023     10/10/2023    K 4.0 10/10/2023    CO2 22 10/10/2023     10/10/2023    BUN 13 10/10/2023    CREATININE 0.79 10/10/2023    EGFR >90 10/10/2023     LFTs  Lab Results   Component Value Date    ALT 24 10/10/2023    AST 22 10/10/2023    ALKPHOS 135 10/10/2023    BILITOT 0.4 10/10/2023     FLP  Lab Results   Component Value Date    TRIG 96 11/28/2023    CHOL 162 11/28/2023    LDLF 69 12/19/2022    LDLCALC 85 11/28/2023    HDL 57.8 11/28/2023     Urine Microalbumin  Lab Results   Component Value Date    MICROALBCREA 201.6 (H) 11/28/2023     Vitamin B12  Lab Results   Component Value Date    FPRBIFBY49 350 11/28/2023         Assessment/Plan   Problem List Items Addressed This Visit       Type 2 diabetes mellitus without complication, without long-term current use of insulin (Multi)     Diabetes: Uncontrolled with last A1c 9.3% on 3/1/24. Current regimen includes Metformin XR 500mg 2 tabs in the morning and 2 tabs in the evening, Jardiance 25 mg daily, and glimepiride 2mg every morning. Home BG readings FBG . Due to FBG at goal, plan to continue current regimen and follow-up in 3 months.  Continue taking Metformin XR 500mg 2 tabs in the morning and 2 tabs in the evening, Jardiance 25 mg daily, glimepiride 2mg daily every morning.  Continue to monitor and record FBG daily.  Check BG if experiencing any signs of hypoglycemia, including racing heart.         Relevant Orders    Follow Up In [facility] - Pharmacy     Patient Education:  All questions and concerns addressed. Contact pharmacist with any further questions or concerns prior to next appointment.    Clinical Pharmacist follow-up: 7/25/24 at 1:30pm, Telehealth visit    Gini Lockhart PharmD  Clinical Pharmacist  04/25/24    Continue all meds under the continuation of care with the referring provider and clinical pharmacy team.  Verbal consent to manage patient's drug therapy was obtained from the patient. They were informed they may decline to participate or withdraw from participation in pharmacy services at any time.

## 2024-05-06 NOTE — OP NOTE
PREOPERATIVE DIAGNOSIS:  1. Status post right ankle arthroplasty.  2. Malalignment of right distal tibia, 14-degree varus and  approximately 15-degree apex anterior deformity with contracture of  right ankle arthroplasty.   3. History of diabetes with peripheral neuropathy.    POSTOPERATIVE DIAGNOSIS:  1. Status post right ankle arthroplasty.  2. Malalignment of right distal tibia, 14-degree varus and  approximately 15-degree apex anterior deformity with contracture of  right ankle arthroplasty.   3. History of diabetes with peripheral neuropathy.    OPERATION/PROCEDURE:  Right distal tibia and fibular osteotomy with anterolateral closing  wedge and plating through separate incisions.     SURGEON:  Keon Kaur MD.    ASSISTANT(S):  1. Viktoria Baeza, physician assistant.  Please note that Viktoria was  required for this entire case as the resident, Dr. Gunn, was  unavailable for the first half of the case.   2. Dr. Clark Gunn.    ANESTHESIA:  General.    ESTIMATED BLOOD LOSS:  75 cc.    PREOPERATIVE INDICATION:  This is a 67-year-old male with a history of non-insulin-dependent  diabetes and peripheral neuropathy who has seen me several times in  the past.  He had a right total ankle replacement back in 2018 by Dr. Keon Tripp at [facility] and he had problems.  He was  unable to get to neutral dorsiflexion and he had contracture.  He had  a release done by Dr. Tripp and revision, but it still did not help  him completely.  He still had at least a 10-degree equinus  contracture and he was walking on the lateral portion of his right  foot.  Please note that he does have a left ankle problem.  He has an  ankle fusion on that side which he does not particularly like the  positioning, but it is much better than the right side.  I talked to  him in length.  I believe he has about a 14-degree apex anterior  deformity and about a 12-degree varus deformity of the distal tibia  and a partially  malaligned ankle causing him this problem.  Of  course, he also has a contracture of the ankle which is unrelated to  the position, but the idea would be to get him into a better position  so that even though the ankle does not move much, it would be in a  more neutral dorsiflexed position.  We talked about using a plate and  screws to hold the closing wedge together.  Risks and benefits were  discussed and he did agree to proceed.     DESCRIPTION OF PROCEDURE:  After a full Huddle was performed, the patient had adequate general  anesthesia.  He was given 2 g of Ancef IV and appropriate TXA.  A  tourniquet was placed on proximal right lower extremity and the  patient was placed supine in such a way to point the patella on the  right side directly anteriorly.  Bumps were placed underneath the  right leg in order to elevate the right leg, so good lateral x-rays  could be taken.  The entire right lower extremity was then prepped  and draped in usual sterile fashion.  A surgical pause was then  performed.     Attention was first turned to performing a fibular osteotomy at the  level about 10 cm above the ankle.  A small incision was made over  the fibula laterally.  The incision was taken down anterior to the  peroneal muscle and tendons and the fibula was exposed with 2 baby  Hohmann retractors.  Using a small oscillating saw, the oblique  osteotomy was created in the fibula and attention was then turned to  the tibia.     There was a previous anterior incision over the ankle, which I am  assuming went anterior direct, but not anterolateral.  We used the  scar as the same incision anteriorly and extended the incision not as  far distally, but more proximally.  Once we went through the skin, we  moved the skin laterally and went next to the peroneus tertius  tendons laterally to elevate the entire neurovascular bundle  anteriorly and the tendons of the anterior compartment.  We never saw  the ankle replacement itself.   We exposed right above that and we  gently released the tissue connected to the periosteum on the distal  tibia, but did not take the periosteum down.  We then extended  proximally over the anterior compartment, elevating the muscle  compartment, exposing enough of the tibia to perform the osteotomy.  Using preoperative planning, the oblique plane deformity would be  directed 40 degrees off the AP plane to the lateral side, directed  anterolateral to posteromedial.  The size of the wedge planned was  about 7 mm on the anterolateral side.  We planned the osteotomy to be  far enough from the arthroplasty that we could put the Synthes  anterolateral plate and have plenty room for this, so this was placed  at the metaphyseal-diaphyseal junction which would allow for better  healing as well.  We used first an osteotome and then a small  oscillating saw to create a transverse osteotomy to the medial cortex  of the metaphysis of the tibia, but not through it.  We then placed  approximately a 15-degree wedge with a 7 mm maximum wedge from  anterolateral to posteromedial to the medial cortex.  We removed that  bone closing wedge and we then went to close down anterolaterally to  correct the varus and apex anterior deformity that the patient had.  We did have to go back to the fibula and take a little bit more bone  off the fibula using an oscillating saw as this was impingement on  the fibula.  Once we did this, we easily could correct the deformity  and we really liked the AP and lateral alignment.  We were able to  get the ankle into neutral dorsiflexion and the hindfoot had about 5  degrees of valgus.  The overall distal tibial angle of the  arthroplasty with the long axis of the tibia measured 90 degrees on  the AP.  We then cross-pinned it temporarily.  We did use the bone  from the wedge and packed it around the osteotomy for better healing  of the tibia, and then, we obtained a 7-hole anterolateral Synthes  3.5 distal  tibial plate.  We had to bend it slightly to contour for  the osteotomy, but this was quite easily, was very minor.  We then  put the plate distally from anterolateral to anteromedial at a level  high enough above the arthroplasty, but below the osteotomy to get  good purchase and fit well.  We put 1 nonlocking screw from anterior  to posterior in the distal tibia, bringing the plate down to the  bone.  This was a 3.5 screw and then we filled the rest distally with  3.5 locking screws.  We then went proximally and used 2 non-locking  screws initially in the lateral portion of this plate from lateral to  medial across the tibia.  We compressed using the first screw,  putting the screw in an oblique fashion and very proximal in the hole  so that when we brought the 3.5 screw down, it further compressed the  osteotomy.  We did remove the cross pins at this time.  We then used  2 additional nonlocking screws proximally.  The most proximal was  placed through a percutaneous site and we used 1 locking screw just  proximal to the fracture as well through the side-plate.  We also  exchanged the nonlocking screw we put in earlier with a locking screw  in the distal tibia from anterior to posterior.  AP and lateral  revealed a corrected deformity on the tibia with a neutral of 90  degrees on AP with the arthroplasty, and on the lateral, there was 10  degrees of apex posterior, which is the normal alignment.  The  tourniquet was released.  There was no major bleeding.  There was a  little bleeding from the peroneal vein, which was quickly coagulated.   We copiously irrigated out and closed with 0 Vicryl suture, 2-0  Vicryl, and 3-0 nylon.  Sterile dressings were applied.  The patient  was placed in a short-leg splint for now.  The patient will be  nonweightbearing.  The patient will most likely be admitted for  observation.  The patient receive aspirin 81 mg b.i.d. for DVT  prophylaxis.  The patient tolerated this procedure  well.       Keon Kaur MD    DD:  06/30/2023 10:05:22 EST  DT:  06/30/2023 10:53:01 EST  DICTATION NUMBER:  [id number]  INTERNAL JOB NUMBER:  [id number]    CC:  Keon Kaur MD        Electronic Signatures:  Keon Kaur) (Signed on 30-Jun-2023 13:23)   Authored  Unsigned, Draft (SYS GENERATED) (Entered on 30-Jun-2023 10:53)   Entered    Last Updated: 30-Jun-2023 13:23 by Keon Kaur)

## 2024-05-14 ENCOUNTER — HOSPITAL ENCOUNTER (OUTPATIENT)
Dept: RADIOLOGY | Facility: EXTERNAL LOCATION | Age: 69
Discharge: HOME | End: 2024-05-14
Payer: MEDICARE

## 2024-05-14 DIAGNOSIS — M25.572 LEFT ANKLE PAIN, UNSPECIFIED CHRONICITY: ICD-10-CM

## 2024-06-05 ENCOUNTER — HOSPITAL ENCOUNTER (EMERGENCY)
Facility: HOSPITAL | Age: 69
Discharge: HOME | End: 2024-06-05
Attending: INTERNAL MEDICINE
Payer: MEDICARE

## 2024-06-05 ENCOUNTER — HOSPITAL ENCOUNTER (OUTPATIENT)
Dept: CARDIOLOGY | Facility: HOSPITAL | Age: 69
Discharge: HOME | End: 2024-06-05
Payer: MEDICARE

## 2024-06-05 ENCOUNTER — APPOINTMENT (OUTPATIENT)
Dept: RADIOLOGY | Facility: HOSPITAL | Age: 69
End: 2024-06-05
Payer: MEDICARE

## 2024-06-05 VITALS
BODY MASS INDEX: 32.51 KG/M2 | RESPIRATION RATE: 16 BRPM | SYSTOLIC BLOOD PRESSURE: 139 MMHG | TEMPERATURE: 97.8 F | WEIGHT: 240 LBS | DIASTOLIC BLOOD PRESSURE: 92 MMHG | OXYGEN SATURATION: 94 % | HEART RATE: 79 BPM | HEIGHT: 72 IN

## 2024-06-05 DIAGNOSIS — R00.2 HEART PALPITATIONS: ICD-10-CM

## 2024-06-05 DIAGNOSIS — I47.10 SVT (SUPRAVENTRICULAR TACHYCARDIA) (CMS-HCC): ICD-10-CM

## 2024-06-05 DIAGNOSIS — I47.19 NARROW COMPLEX TACHYCARDIA (CMS-HCC): ICD-10-CM

## 2024-06-05 DIAGNOSIS — I47.19 NARROW COMPLEX TACHYCARDIA (CMS-HCC): Primary | ICD-10-CM

## 2024-06-05 LAB
ALBUMIN SERPL BCP-MCNC: 4.1 G/DL (ref 3.4–5)
ALP SERPL-CCNC: 79 U/L (ref 33–136)
ALT SERPL W P-5'-P-CCNC: 17 U/L (ref 10–52)
ANION GAP SERPL CALC-SCNC: 17 MMOL/L (ref 10–20)
AST SERPL W P-5'-P-CCNC: 17 U/L (ref 9–39)
BASOPHILS # BLD AUTO: 0.03 X10*3/UL (ref 0–0.1)
BASOPHILS NFR BLD AUTO: 0.5 %
BILIRUB SERPL-MCNC: 0.4 MG/DL (ref 0–1.2)
BNP SERPL-MCNC: 149 PG/ML (ref 0–99)
BUN SERPL-MCNC: 24 MG/DL (ref 6–23)
CALCIUM SERPL-MCNC: 8.8 MG/DL (ref 8.6–10.3)
CARDIAC TROPONIN I PNL SERPL HS: 8 NG/L (ref 0–20)
CARDIAC TROPONIN I PNL SERPL HS: 9 NG/L (ref 0–20)
CHLORIDE SERPL-SCNC: 104 MMOL/L (ref 98–107)
CO2 SERPL-SCNC: 20 MMOL/L (ref 21–32)
CREAT SERPL-MCNC: 1.21 MG/DL (ref 0.5–1.3)
EGFRCR SERPLBLD CKD-EPI 2021: 66 ML/MIN/1.73M*2
EOSINOPHIL # BLD AUTO: 0.07 X10*3/UL (ref 0–0.7)
EOSINOPHIL NFR BLD AUTO: 1.1 %
ERYTHROCYTE [DISTWIDTH] IN BLOOD BY AUTOMATED COUNT: 15.4 % (ref 11.5–14.5)
GLUCOSE SERPL-MCNC: 183 MG/DL (ref 74–99)
HCT VFR BLD AUTO: 44.7 % (ref 41–52)
HGB BLD-MCNC: 14.4 G/DL (ref 13.5–17.5)
IMM GRANULOCYTES # BLD AUTO: 0.01 X10*3/UL (ref 0–0.7)
IMM GRANULOCYTES NFR BLD AUTO: 0.2 % (ref 0–0.9)
LYMPHOCYTES # BLD AUTO: 2.02 X10*3/UL (ref 1.2–4.8)
LYMPHOCYTES NFR BLD AUTO: 32.4 %
MAGNESIUM SERPL-MCNC: 1.8 MG/DL (ref 1.6–2.4)
MCH RBC QN AUTO: 27.1 PG (ref 26–34)
MCHC RBC AUTO-ENTMCNC: 32.2 G/DL (ref 32–36)
MCV RBC AUTO: 84 FL (ref 80–100)
MONOCYTES # BLD AUTO: 0.57 X10*3/UL (ref 0.1–1)
MONOCYTES NFR BLD AUTO: 9.1 %
NEUTROPHILS # BLD AUTO: 3.53 X10*3/UL (ref 1.2–7.7)
NEUTROPHILS NFR BLD AUTO: 56.7 %
NRBC BLD-RTO: 0 /100 WBCS (ref 0–0)
PLATELET # BLD AUTO: 219 X10*3/UL (ref 150–450)
POTASSIUM SERPL-SCNC: 4.2 MMOL/L (ref 3.5–5.3)
PROT SERPL-MCNC: 7.1 G/DL (ref 6.4–8.2)
RBC # BLD AUTO: 5.31 X10*6/UL (ref 4.5–5.9)
SODIUM SERPL-SCNC: 137 MMOL/L (ref 136–145)
TSH SERPL-ACNC: 1.65 MIU/L (ref 0.44–3.98)
WBC # BLD AUTO: 6.2 X10*3/UL (ref 4.4–11.3)

## 2024-06-05 PROCEDURE — 80053 COMPREHEN METABOLIC PANEL: CPT | Performed by: INTERNAL MEDICINE

## 2024-06-05 PROCEDURE — 99291 CRITICAL CARE FIRST HOUR: CPT | Performed by: INTERNAL MEDICINE

## 2024-06-05 PROCEDURE — 83735 ASSAY OF MAGNESIUM: CPT | Performed by: INTERNAL MEDICINE

## 2024-06-05 PROCEDURE — 84443 ASSAY THYROID STIM HORMONE: CPT | Performed by: INTERNAL MEDICINE

## 2024-06-05 PROCEDURE — 99223 1ST HOSP IP/OBS HIGH 75: CPT | Performed by: INTERNAL MEDICINE

## 2024-06-05 PROCEDURE — 71045 X-RAY EXAM CHEST 1 VIEW: CPT | Mod: FOREIGN READ | Performed by: RADIOLOGY

## 2024-06-05 PROCEDURE — 96374 THER/PROPH/DIAG INJ IV PUSH: CPT

## 2024-06-05 PROCEDURE — 36415 COLL VENOUS BLD VENIPUNCTURE: CPT | Performed by: INTERNAL MEDICINE

## 2024-06-05 PROCEDURE — 2500000004 HC RX 250 GENERAL PHARMACY W/ HCPCS (ALT 636 FOR OP/ED)

## 2024-06-05 PROCEDURE — 83880 ASSAY OF NATRIURETIC PEPTIDE: CPT | Performed by: INTERNAL MEDICINE

## 2024-06-05 PROCEDURE — 85025 COMPLETE CBC W/AUTO DIFF WBC: CPT | Performed by: INTERNAL MEDICINE

## 2024-06-05 PROCEDURE — 84484 ASSAY OF TROPONIN QUANT: CPT | Mod: 91 | Performed by: INTERNAL MEDICINE

## 2024-06-05 PROCEDURE — 99284 EMERGENCY DEPT VISIT MOD MDM: CPT | Mod: 25

## 2024-06-05 PROCEDURE — 84484 ASSAY OF TROPONIN QUANT: CPT | Performed by: INTERNAL MEDICINE

## 2024-06-05 PROCEDURE — 71045 X-RAY EXAM CHEST 1 VIEW: CPT

## 2024-06-05 PROCEDURE — 93246 EXT ECG>7D<15D RECORDING: CPT

## 2024-06-05 RX ORDER — ADENOSINE 3 MG/ML
INJECTION, SOLUTION INTRAVENOUS
Status: COMPLETED
Start: 2024-06-05 | End: 2024-06-05

## 2024-06-05 RX ORDER — DILTIAZEM HYDROCHLORIDE 120 MG/1
120 TABLET, FILM COATED ORAL 4 TIMES DAILY PRN
Qty: 120 TABLET | Refills: 0 | Status: SHIPPED | OUTPATIENT
Start: 2024-06-05 | End: 2025-06-05

## 2024-06-05 RX ORDER — ADENOSINE 3 MG/ML
6 INJECTION, SOLUTION INTRAVENOUS ONCE
Status: COMPLETED | OUTPATIENT
Start: 2024-06-05 | End: 2024-06-05

## 2024-06-05 RX ADMIN — ADENOSINE 6 MG: 3 INJECTION, SOLUTION INTRAVENOUS at 14:08

## 2024-06-05 ASSESSMENT — PAIN DESCRIPTION - LOCATION: LOCATION: CHEST

## 2024-06-05 ASSESSMENT — PAIN DESCRIPTION - DESCRIPTORS: DESCRIPTORS: PRESSURE

## 2024-06-05 ASSESSMENT — CHADS2 SCORE
CHF: NO
PRIOR STROKE OR TIA OR THROMBOEMBOLISM: NO
DIABETES: YES
CHADS2 SCORE: 2
HYPERTENSION: YES
AGE GREATER THAN OR EQUAL TO 75: NO

## 2024-06-05 ASSESSMENT — PAIN - FUNCTIONAL ASSESSMENT: PAIN_FUNCTIONAL_ASSESSMENT: 0-10

## 2024-06-05 ASSESSMENT — ACTIVITIES OF DAILY LIVING (ADL): LACK_OF_TRANSPORTATION: NO

## 2024-06-05 ASSESSMENT — COLUMBIA-SUICIDE SEVERITY RATING SCALE - C-SSRS
1. IN THE PAST MONTH, HAVE YOU WISHED YOU WERE DEAD OR WISHED YOU COULD GO TO SLEEP AND NOT WAKE UP?: NO
6. HAVE YOU EVER DONE ANYTHING, STARTED TO DO ANYTHING, OR PREPARED TO DO ANYTHING TO END YOUR LIFE?: NO
2. HAVE YOU ACTUALLY HAD ANY THOUGHTS OF KILLING YOURSELF?: NO

## 2024-06-05 ASSESSMENT — PAIN SCALES - GENERAL: PAINLEVEL_OUTOF10: 2

## 2024-06-05 NOTE — PROGRESS NOTES
06/05/24 1417   [facility] Disability Status   Are you deaf or do you have serious difficulty hearing? N   Are you blind or do you have serious difficulty seeing, even when wearing glasses? N   Because of a physical, mental, or emotional condition, do you have serious difficulty concentrating, remembering, or making decisions? (5 years old or older) N   Do you have serious difficulty walking or climbing stairs? N   Do you have serious difficulty dressing or bathing? N   Because of a physical, mental, or emotional condition, do you have serious difficulty doing errands alone such as visiting the doctor? N

## 2024-06-05 NOTE — ED TRIAGE NOTES
Pt. Arrived to the ED via EMS after they were called to urgent care. Pt went to urgent care this morning because he was experience chest pressure a 4/10 that radiated up to his right jaw. Pt. Also states he has been feeling more SOB. EMS gave 1 nitroglycerin and 324 of Asprin prior to arrival and pt. Rating his pain currently a 2/10

## 2024-06-05 NOTE — CONSULTS
Inpatient consult to Cardiology  Consult performed by: Husam Quigley DO  Consult ordered by: Anna Marie Reyes MD  Reason for consult: SVT, chest pain  Assessment/Recommendations: Narrow Complex Short RP Tachycardia. 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 He converted with 6 mg IV Adenosine during his presentation in the ER.  The Elevated BNP is likley related to stretch from the Atrial Arrhythmia and not indicative of heart Failure  -- Continue supportive care   -- Ensure adequate hydration as well  -- Limited by blood pressure at this time for further up-titration of his Diltiazem which is appropriate for controlling his heart rates.  I did speak with him about increasing compliance in making sure that he continues his medications.  -- Would use as needed Diltiazem 120 mg IR PO Q 6 hours PRN Palpitations for home use. Instructed on Vagal maneuvers.   -- We will obtain a transthoracic echocardiogram for structural evaluation including ejection fraction, assessment of regional wall motion abnormalities or valvular disease, and further evaluation of hemodynamics. This can be completed in the O/P setting and has been ordered   -- The patient will benefit from discharge with a Zio or Preventice Patch event monitor for 2 weeks.  Zio or Preventice Patch can only be placed by [facility] nursing during regular business hours (9AM-4PM Monday-Friday) after the discharge order has been placed.  The results will be discussed at the patient's followup visit. This has also been ordered.                   History Of Present Illness:    Rui Shah is a 68 y.o. male with a pertinent medical history notable for Type 2 diabetes mellitus, essential hypertension, dyslipidemia, nonobstructive coronary artery disease by cardiac catheterization with suspicion for microvascular dysfunction by cardiac catheterization 12/2021, gastroesophageal reflux disease, depression, presenting to the emergency department with chest pain, heart  "palpitations.  Cardiology has been consulted for \"SVT, Chest Pain\"     Patient states that over the last 3 days, he has had increased heart palpitations and feeling lightheaded, weak.  Symptoms last anywhere from 20 minutes up to 2 hours.  On day of presentation, he felt like his heart was racing and he felt cold, L.  Took his blood pressure which was in the 80s over 60s.  His heart rate was elevated.  This lasted for several hours and then resolved.  He went to urgent care who then referred them on to the emergency department.     On arrival to the emergency department, he was afebrile 36.6 blood pressure 144/89 heart rate 84 respiratory rate 16 without supplemental oxygen use saturating 95%.  At roughly 1318, his heart rate increased to 135 bpm EKG at that time shows short RP tachycardia that was terminated with 6 mg IV adenosine EKG thereafter shows sinus rhythm without any ischemia FL noted to be 178 on post adenosine EKG.  Metabolic panel shows serum sodium 137 potassium 4.2 BUN 24, creatinine 1.21.  Magnesium level 1.8.  CBC shows white count 6.2 hemoglobin over hematocrit 14.4-44.7 platelet count 219.  Brain atretic peptide modestly elevated at 149 high-sensitivity cardiac troponins negative at 9.  Thyroid panel is currently pending.    Patient denies chest pain and angina after resolution of heart palpitations.  Pt denies shortness of breath, dyspnea on exertion, orthopnea, and paroxysmal nocturnal dyspnea.  Pt denies worsening lower extremity edema.  Pt reports palpitations but no syncope.  No recent falls.  No fever or chills.  No cough.  No change in bowel or bladder habits.  No travel.  No sick contacts.  No recent travel    12 point review of systems was performed and is otherwise negative.  Past medical history:  As above.  Medications:  Reviewed.  Allergies:  Reviewed.  Social history:  Patient denies smoking, alcohol abuse, or illicit drug use.  Retired , avid fisherman  Family history:  " No sudden cardiac death or premature coronary artery disease.       Last Recorded Vitals:  Vitals:    06/05/24 1318 06/05/24 1334 06/05/24 1405 06/05/24 1410   BP: 109/88  113/81 144/89   Pulse: (!) 135 (!) 125 (!) 134 84   Resp: 16 18 18 16   Temp:       SpO2: 96% 95% 95% 95%   Weight:       Height:           Last Labs:  CBC - 6/5/2024:  1:27 PM  6.2 14.4 219    44.7      CMP - 6/5/2024:  1:27 PM  8.8 7.1 17 --- 0.4   _ 4.1 17 79      PTT - No results in last year.  _   _ _     Troponin I, High Sensitivity   Date/Time Value Ref Range Status   06/05/2024 01:27 PM 9 0 - 20 ng/L Final     BNP   Date/Time Value Ref Range Status   06/05/2024 01:27  (H) 0 - 99 pg/mL Final   12/01/2021 06:41 PM 21 0 - 99 pg/mL Final     Comment:     .  <100 pg/mL - Heart failure unlikely  100-299 pg/mL - Intermediate probability of acute heart  .               failure exacerbation. Correlate with clinical  .               context and patient history.    >=300 pg/mL - Heart Failure likely. Correlate with clinical  .               context and patient history.  BNP testing is performed using different testing   methodology at [facility] than at other   [facility]. Direct result comparisons should   only be made within the same method.       POC HEMOGLOBIN A1c   Date/Time Value Ref Range Status   03/01/2024 09:30 AM 9.3 (A) 4.2 - 6.5 % Final     Hemoglobin A1C   Date/Time Value Ref Range Status   10/10/2023 03:47 PM 7.3 (H) see below % Final   06/26/2023 01:47 PM 9.6 (A) % Final     Comment:          Diagnosis of Diabetes-Adults   Non-Diabetic: < or = 5.6%   Increased risk for developing diabetes: 5.7-6.4%   Diagnostic of diabetes: > or = 6.5%  .       Monitoring of Diabetes                Age (y)     Therapeutic Goal (%)   Adults:          >18           <7.0   Pediatrics:    13-18           <7.5                   7-12           <8.0                   0- 6            7.5-8.5   American Diabetes Association. Diabetes  "Care 33(S1), Jan 2010.     04/02/2019 08:40 AM 6.7 % Final     Comment:          Diagnosis of Diabetes-Adults   Non-Diabetic: < or = 5.6%   Increased risk for developing diabetes: 5.7-6.4%   Diagnostic of diabetes: > or = 6.5%  .       Monitoring of Diabetes                Age (y)     Therapeutic Goal (%)   Adults:          >18           <7.0   Pediatrics:    13-18           <7.5                   7-12           <8.0                   0- 6            7.5-8.5   American Diabetes Association. Diabetes Care 33(S1), Jan 2010.       LDL Calculated   Date/Time Value Ref Range Status   11/28/2023 12:02 PM 85 <=99 mg/dL Final     Comment:                                 Near   Borderline      AGE      Desirable  Optimal    High     High     Very High     0-19 Y     0 - 109     ---    110-129   >/= 130     ----    20-24 Y     0 - 119     ---    120-159   >/= 160     ----      >24 Y     0 -  99   100-129  130-159   160-189     >/=190       VLDL   Date/Time Value Ref Range Status   11/28/2023 12:02 PM 19 0 - 40 mg/dL Final   12/19/2022 10:31 AM 27 0 - 40 mg/dL Final   12/02/2021 02:51 AM 35 0 - 40 mg/dL Final   05/05/2021 11:17 AM 18 0 - 40 mg/dL Final      Last I/O:  No intake/output data recorded.    Past Cardiology Tests (Last 3 Years):  EKG:  EKG 06/05/24@ 14:10        EKG 06/05/24 @ 13:18 Short RP Tachycardia with retrograde P waves           Echo:12/2021  1. The left ventricular systolic function is normal with a 55-60% estimated ejection fraction.   2. Spectral Doppler shows an impaired relaxation pattern of left ventricular diastolic filling.   3. There is mild aortic valve regurgitation.   4. Aortic root mildly enlarged at 4.1 cm.       Ejection Fractions:  No results found for: \"EF\"  Cath:  Coronary Angiography:  The coronary circulation is co-dominant.     Left Main Coronary Artery:  The left main coronary artery is a normal caliber vessel. The left main arises normally from the left coronary sinus of Valsalva and " bifurcates into the LAD and circumflex coronary arteries. The left main coronary artery showed no significant disease or stenosis greater than 30%.     Left Anterior Descending Coronary Artery Distribution:  The left anterior descending coronary artery is a normal caliber vessel. The LAD arises normally from the left main coronary artery. The LAD demonstrated no significant disease or stenosis greater than 30%. The 1st diagonal branch showed no significant disease or stenosis greater than 30%. The 2nd diagonal branch demonstrated no significant disease or stenosis greater than 30%.     Circumflex Coronary Artery Distribution:  The circumflex coronary artery is a normal caliber vessel. The circumflex arises normally from the left main coronary artery and terminates in the AV groove. The circumflex revealed no significant disease or stenosis greater than 30%. The 1st obtuse marginal branch showed no significant disease or stenosis greater than 30%. The 2nd obtuse marginal branch demonstrated no significant disease or stenosis greater than 30%. The 3rd obtuse marginal branch revealed no significant disease or stenosis greater than 30%. The left posterior descending artery showed no significant disease or stenosis greater than 30%.     Right Coronary Artery Distribution:     The right coronary artery is a normal caliber vessel. The RCA arises normally from the right sinus of Valsalva. The RCA showed no significant disease or stenosis greater than 30%. The acute marginal branch showed no significant disease or stenosis greater than 30%.  The right posterolateral branch showed no significant disease or stenosis greater than 30%. The right posterior descending artery showed no significant disease or stenosis greater than 30%.        Left Ventriculography:  Normal LVEDP at 15 mmHg.    Stress Test:  No results found for this or any previous visit from the past 1095 days.    Cardiac Imaging:  CXR 06/05/24        Past Medical  History:  He has a past medical history of Abdominal pain (02/06/2023), Acute sinusitis, unspecified (02/06/2023), Chronic pansinusitis (02/06/2023), Diabetes (Multi), Facial pressure (02/06/2023), Persistent vomiting (02/06/2023), Personal history of other diseases of the circulatory system (12/20/2019), Personal history of other diseases of the respiratory system (12/20/2019), Refractory sinusitis (02/06/2023), Routine general medical examination at [facility] care facility (11/28/2023), and Snoring (03/06/2023).    Past Surgical History:  He has a past surgical history that includes Ankle surgery (07/24/2018); Shoulder surgery (07/24/2018); Back surgery (07/24/2018); and Sinus surgery (07/24/2018).      Social History:  He reports that he has never smoked. He has never been exposed to tobacco smoke. He has never used smokeless tobacco. He reports that he does not drink alcohol and does not use drugs.    Family History:  Family History   Problem Relation Name Age of Onset    Arthritis Mother          Allergies:  Gabapentin    Inpatient Medications:  Scheduled medications   Medication Dose Route Frequency    adenosine        adenosine  6 mg intravenous Once     PRN medications   Medication    adenosine     Continuous Medications   Medication Dose Last Rate     Outpatient Medications:  Current Outpatient Medications   Medication Instructions    albuterol 90 mcg/actuation inhaler 2 puffs, inhalation, Every 4 hours PRN    azelastine (Astelin) 137 mcg (0.1 %) nasal spray 1 spray, Each Nostril, 2 times daily, Use in each nostril as directed    blood sugar diagnostic strip Use to check blood glucose up to three times daily as directed    budesonide-formoteroL (Symbicort) 160-4.5 mcg/actuation inhaler 2 puffs, inhalation, 2 times daily    buPROPion XL (WELLBUTRIN XL) 150 mg, oral, Every morning, Do not crush, chew, or split.    dilTIAZem ER (Tiazac) 240 mg 24 hr capsule 1 capsule, oral, Daily    esomeprazole (NEXIUM) 40 mg,  oral, Daily before breakfast    glimepiride (AMARYL) 2 mg, oral, Daily before breakfast    glucose 16 g, oral, As needed    Jardiance 25 mg, oral, Daily    losartan (COZAAR) 25 mg, oral, Daily    metFORMIN XR (GLUCOPHAGE-XR) 1,000 mg, oral, 2 times daily, TAKE 1 TABLET DAILY WITH BREAKFAST, AND TAKE 2 TABLETS DAILY WITH DINNER    nitrofurantoin, macrocrystal-monohydrate, (Macrobid) 100 mg capsule     ondansetron (ZOFRAN) 4 mg, oral, Every 8 hours PRN    rosuvastatin (CRESTOR) 10 mg, oral, Daily    sodium,potassium,mag sulfates (Suprep Bowel Prep Kit) 17.5-3.13-1.6 gram recon soln solution Take one bottle twice as directed by the prep instructions    tamsulosin (FLOMAX) 0.8 mg, oral, Nightly    venlafaxine XR (Effexor-XR) 75 mg 24 hr capsule TAKE 3 CAPSULES (225 MG) BY MOUTH EVERY DAY       Physical Exam:  Documented Vital Signs   Heart Rate:  []   Temperature:  [36.6 °C (97.8 °F)]   Respirations:  [16-18]   BP: (109-144)/(76-90)   Height:  [182.9 cm (6')]   Weight:  [109 kg (240 lb)]   Pulse Ox:  [95 %-97 %]   Temperature:  [36.6 °C (97.8 °F)] 36.6 °C (97.8 °F)  Heart Rate:  [] 73  Respirations:  [16-18] 18  BP: (109-144)/(76-90) 128/76     Documented Fluid Status   No intake or output data in the 24 hours ending 06/05/24 1506  Net IO Since Admission: No IO data has been entered for this period [06/05/24 1506]    PHYSICAL EXAMINATION  /76   Pulse 73   Temp 36.6 °C (97.8 °F)   Resp 18   Ht 1.829 m (6')   Wt 109 kg (240 lb)   SpO2 97%   BMI 32.55 kg/m²   GENERAL APPEARANCE: Well developed, well nourished, in no acute distress.  VITAL SIGNS: reviewed and confirmed.   SKIN: Inspection of the skin reveals no rashes, ulcerations or petechiae. He has some solar lesions and Xerosis on his knees  HEENT: The sclerae were anicteric and conjunctivae were pink and moist. Extraocular movements were intact and pupils were equal, round, and reactive to light with normal accommodation. External inspection of  "the ears and nose showed no scars, lesions, or masses.   NECK: Supple and symmetric. There was no thyroid enlargement, and no tenderness, or masses were felt. Neck veins flat at 45 degrees HOB  CHEST: Normal AP diameter and normal contour without any kyphoscoliosis.  LUNGS: Auscultation of the lungs revealed normal breath sounds without any other adventitious sounds or rubs.  CARDIOVASCULAR: There was a regular rate and rhythm without any murmurs, gallops, rubs. The carotid pulses were normal and 2+ bilaterally without bruits. Peripheral pulses were 2+ and symmetric.  ABDOMEN: Soft and nontender with normal bowel sounds.  MUSCULOSKELETAL: Gait was normal. There was no tenderness or effusions noted. Muscle strength and tone were normal.  EXTREMITIES: No cyanosis, clubbing or edema.  NEUROLOGIC: Alert and oriented x 3. Normal affect. Gait was normal. Normal deep tendon reflexes with no pathological reflexes. Sensation to touch was normal.       Assessment/Plan   Rui Shah is a 68 y.o. male with a pertinent medical history notable for Type 2 diabetes mellitus, essential hypertension, dyslipidemia, nonobstructive coronary artery disease by cardiac catheterization with suspicion for microvascular dysfunction by cardiac catheterization 12/2021, gastroesophageal reflux disease, depression, presenting to the emergency department with chest pain, heart palpitations.  Cardiology has been consulted for \"SVT, Chest Pain\"     Narrow Complex Short RP Tachycardia. 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 He converted with 6 mg IV Adenosine during his presentation in the ER.  The Elevated BNP is likley related to stretch from the Atrial Arrhythmia and not indicative of heart Failure  -- Continue supportive care   -- Ensure adequate hydration as well  -- Limited by blood pressure at this time for further up-titration of his Diltiazem which is appropriate for controlling his heart rates.  I did speak with him about increasing compliance " in making sure that he continues his medications.  -- Would use as needed Diltiazem 120 mg IR PO Q 6 hours PRN Palpitations for home use. Instructed on Vagal maneuvers.   -- We will obtain a transthoracic echocardiogram for structural evaluation including ejection fraction, assessment of regional wall motion abnormalities or valvular disease, and further evaluation of hemodynamics. This can be completed in the O/P setting and has been ordered   -- The patient will benefit from discharge with a Zio or Preventice Patch event monitor for 2 weeks.  Zio or Preventice Patch can only be placed by [facility] nursing during regular business hours (9AM-4PM Monday-Friday) after the discharge order has been placed.  The results will be discussed at the patient's followup visit. This has also been ordered.     The patient will benefit from follow-up in Cardiology clinic within 4-6 weeks of discharge.  The patient or their family should call the [facility] at [phone] for [facility].  If the care team is assisting in scheduling a follow up appointment prior to their discharge, please ensure that the patient has committed to attending the scheduled date and time.    Thank you for allowing me to participate in their care.  Please feel free to call me with any further questions or concerns.      Code Status:  No Order      Husam Quigley DO   Division of Cardiovascular Medicine  [facility]

## 2024-06-05 NOTE — DISCHARGE INSTRUCTIONS
"What are vagal maneuvers?  Vagal maneuvers are ways to change a person's heartbeat without using medicines. For some vagal maneuvers, a doctor will press on a certain part of your body. For others, they will have you make a certain movement with your body.    Vagal maneuvers cause changes in your nervous system. Your nervous system then sends signals to your heart. These signals cause a response that affects your heartbeat.    Normally, a heartbeat happens when an electrical signal starts in a certain area in the heart. This electrical signal follows a set path to spread across the heart. As it spreads, the signal causes the heart muscle to squeeze in a normal way and at a regular rate. This causes the heartbeat.    Vagal maneuvers slow down the electrical signals that start a heartbeat. They can also slow down the electrical signals that spread across the heart. Both of these things can slow down the heartbeat.    What are examples of vagal maneuvers?  There are many different vagal maneuvers. The 2 that doctors use most often are \"carotid sinus massage\" and the \"Valsalva maneuver.\"    What does carotid sinus massage involve?  To do carotid sinus massage:    ?You will lie down.    ?The doctor will press on a specific area in your neck. When your doctor does this, they are pressing on your carotid sinus. The carotid sinus lies under your skin in 1 of the carotid arteries (figure 1). The carotid arteries are the main blood vessels that bring blood to the brain. The doctor will press on your carotid sinus for about 5 to 10 seconds.    ?During carotid sinus massage, the doctor will monitor your blood pressure and heart activity. To monitor your heart activity, they will do an electrocardiogram (\"ECG\") (figure 2).    ?Carotid sinus massage slows down the heartbeat and lowers your blood pressure. This can make you feel lightheaded or like you might pass out.    Doctors usually don't do carotid sinus massage in people who " "have certain medical conditions, including carotid artery disease. In carotid artery disease, fatty clumps called \"plaques\" build up in the carotid arteries.    What does the Valsalva maneuver involve?  For the Valsalva maneuver:    ?You might lie down on an exam table or bed. Your head and upper body will be partly raised.    ?The doctor will have you breathe in and then try to breathe out hard, keeping your mouth and nose closed. This can feel like you are \"bearing down\" and trying to have a bowel movement.    ?You will hold this position for up to 10 seconds. Then, you can breathe normally again.    ?After holding your breath, the doctor might lower your head and upper body to flat. Then, they will have someone hold your legs up for 15 seconds.    You were seen today for fast heart rate.  You have something called SVT.  We gave you instructions on vagal maneuvers as above.  Follow-up with cardiology Dr. Quigley as soon as possible.  Continue your current medications.  You can take diltiazem 120 mg every 6 hours as needed palpitations. We placed a holter monitor which cardiology will follow. Your evaluation was not concerning for an emergency at this time. Please see the attached information sheet for information about your condition, how to care for your condition at home, and reasons to return to the emergency department. Take any prescriptions written today as prescribed. You should call your primary care provider within 24 hours to tell them about today's visit, including any new medications or medication changes, as he or she may want to see you in the office for further evaluation. If you do not have a primary care provider, call  [phone] for an appointment. We offer in-person office visits as well as virtual options. Please do not hesitate to call  [phone] or return to the emergency department with any new or unresolved concerns or symptoms. Thank you for choosing [facility] for your care.  " How Severe Is Your Acne?: moderate Is This A New Presentation, Or A Follow-Up?: Follow Up Acne

## 2024-06-05 NOTE — ED PROVIDER NOTES
"HPI     CC: Chest Pain and Jaw Pain     HPI: Rui Shah is a 68 y.o. male with a history of HTN, HLD, DM, depression, presents with chest pain and palpitations.  Patient states he has been \"feeling weird\" for the past 3 days off-and-on.  Day before yesterday he was driving and felt very weak \"like I wanted to pass out.\"  This lasted about 20 minutes and then subsided.  He had associated chest pain radiating to the jaw at that time.  Yesterday he felt okay, maybe a little weak.  Then at 9 AM this morning he felt cold and clammy, felt like his heart racing so he checked his blood pressure and it was low, in the 80s/60s.  Heart rate was noted to be elevated 147.  Symptoms lasted about 2 and half hours and then subsided.  He went to urgent care and they referred him to the ED by squad.  He again had chest pain during this episode radiating to the jaw.  He has had a similar episode in the past for which she did not seek medical treatment.  He only drinks alcohol occasionally, does not use toxic substances    ROS: 10-point review of systems was performed and is otherwise negative except as noted in HPI.    Limitations to history: N/A    Independent Historians: Wife at bedside    External Records Reviewed: Outpatient notes in EMR    Past Medical History: Noncontributory except per HPI     Past Surgical History: Noncontributory except per HPI     Family History: Reviewed and noncontributory     Social History:  Denies tobacco.  Occasional ETOH. Denies illicit drugs.    Social Determinants Affecting Care: N/A    Allergies   Allergen Reactions    Gabapentin Angioedema and Swelling       Home Meds:   Current Outpatient Medications   Medication Instructions    albuterol 90 mcg/actuation inhaler 2 puffs, inhalation, Every 4 hours PRN    azelastine (Astelin) 137 mcg (0.1 %) nasal spray 1 spray, Each Nostril, 2 times daily, Use in each nostril as directed    blood sugar diagnostic strip Use to check blood glucose up to three " times daily as directed    budesonide-formoteroL (Symbicort) 160-4.5 mcg/actuation inhaler 2 puffs, inhalation, 2 times daily    buPROPion XL (WELLBUTRIN XL) 150 mg, oral, Every morning, Do not crush, chew, or split.    dilTIAZem (CARDIZEM) 120 mg, oral, 4 times daily PRN    dilTIAZem ER (Tiazac) 240 mg 24 hr capsule 1 capsule, oral, Daily    esomeprazole (NEXIUM) 40 mg, oral, Daily before breakfast    glimepiride (AMARYL) 2 mg, oral, Daily before breakfast    glucose 16 g, oral, As needed    Jardiance 25 mg, oral, Daily    losartan (COZAAR) 25 mg, oral, Daily    metFORMIN XR (GLUCOPHAGE-XR) 1,000 mg, oral, 2 times daily, TAKE 1 TABLET DAILY WITH BREAKFAST, AND TAKE 2 TABLETS DAILY WITH DINNER    nitrofurantoin, macrocrystal-monohydrate, (Macrobid) 100 mg capsule     ondansetron (ZOFRAN) 4 mg, oral, Every 8 hours PRN    rosuvastatin (CRESTOR) 10 mg, oral, Daily    sodium,potassium,mag sulfates (Suprep Bowel Prep Kit) 17.5-3.13-1.6 gram recon soln solution Take one bottle twice as directed by the prep instructions    tamsulosin (FLOMAX) 0.8 mg, oral, Nightly    venlafaxine XR (Effexor-XR) 75 mg 24 hr capsule TAKE 3 CAPSULES (225 MG) BY MOUTH EVERY DAY        Physical Exam     ED Triage Vitals   Temperature Heart Rate Respirations BP   06/05/24 1258 06/05/24 1258 06/05/24 1258 06/05/24 1258   36.6 °C (97.8 °F) 95 16 111/90      Pulse Ox Temp src Heart Rate Source Patient Position   06/05/24 1258 -- 06/05/24 1318 --   95 %  Monitor       BP Location FiO2 (%)     -- --               Heart Rate:  []   Temperature:  [36.6 °C (97.8 °F)]   Respirations:  [16-18]   BP: (109-144)/(76-92)   Height:  [182.9 cm (6')]   Weight:  [109 kg (240 lb)]   Pulse Ox:  [94 %-97 %]      Physical Exam  Vitals and nursing note reviewed.     CONSTITUTIONAL: Well appearing, well nourished, in no acute distress.   HENMT: Head atraumatic. Airway patent. Nasal mucosa clear. Mouth with normal mucosa, clear oropharynx. Uvula midline. Neck  supple.    EYES: Clear bilaterally, pupils equally round and reactive to light.   CARDIOVASCULAR: Tachycardic, regular rhythm.  Heart sounds S1, S2.  No murmurs, rubs or gallops. Normal pulses. Capillary refill < 2 sec.   RESPIRATORY: No increased work of breathing. Breath sounds clear and equal bilaterally.  GASTROINTESTINAL: Abdomen soft, non-distended, non-tender. No rebound, no guarding. Normal bowel sounds. No palpable masses.  GENITOURINARY:  No CVA tenderness.  MUSCULOSKELETAL: Spine appears normal, range of motion is not limited, no muscle or joint tenderness. No edema.   NEUROLOGICAL: Alert and oriented, no asymmetry, moving all extremities equally.  SKIN: Warm, dry and intact. No rash or notable lesions.  PSYCHIATRIC: Normal mood and affect.  HEME/LYMPH: No adenopathy or splenomegaly.    Diagnostic Results      ECG: ECGs read and interpreted by me. See ED Course, below, for interpretation.    Labs Reviewed   CBC WITH AUTO DIFFERENTIAL - Abnormal       Result Value    WBC 6.2      nRBC 0.0      RBC 5.31      Hemoglobin 14.4      Hematocrit 44.7      MCV 84      MCH 27.1      MCHC 32.2      RDW 15.4 (*)     Platelets 219      Neutrophils % 56.7      Immature Granulocytes %, Automated 0.2      Lymphocytes % 32.4      Monocytes % 9.1      Eosinophils % 1.1      Basophils % 0.5      Neutrophils Absolute 3.53      Immature Granulocytes Absolute, Automated 0.01      Lymphocytes Absolute 2.02      Monocytes Absolute 0.57      Eosinophils Absolute 0.07      Basophils Absolute 0.03     COMPREHENSIVE METABOLIC PANEL - Abnormal    Glucose 183 (*)     Sodium 137      Potassium 4.2      Chloride 104      Bicarbonate 20 (*)     Anion Gap 17      Urea Nitrogen 24 (*)     Creatinine 1.21      eGFR 66      Calcium 8.8      Albumin 4.1      Alkaline Phosphatase 79      Total Protein 7.1      AST 17      Bilirubin, Total 0.4      ALT 17     B-TYPE NATRIURETIC PEPTIDE - Abnormal     (*)     Narrative:        <100 pg/mL  - Heart failure unlikely  100-299 pg/mL - Intermediate probability of acute heart                  failure exacerbation. Correlate with clinical                  context and patient history.    >=300 pg/mL - Heart Failure likely. Correlate with clinical                  context and patient history.    BNP testing is performed using different testing methodology at [facility] than at other [facility]. Direct result comparisons should only be made within the same method.      MAGNESIUM - Normal    Magnesium 1.80     SERIAL TROPONIN-INITIAL - Normal    Troponin I, High Sensitivity 9      Narrative:     Less than 99th percentile of normal range cutoff-  Female and children under 18 years old <14 ng/L; Male <21 ng/L: Negative  Repeat testing should be performed if clinically indicated.     Female and children under 18 years old 14-50 ng/L; Male 21-50 ng/L:  Consistent with possible cardiac damage and possible increased clinical   risk. Serial measurements may help to assess extent of myocardial damage.     >50 ng/L: Consistent with cardiac damage, increased clinical risk and  myocardial infarction. Serial measurements may help assess extent of   myocardial damage.      NOTE: Children less than 1 year old may have higher baseline troponin   levels and results should be interpreted in conjunction with the overall   clinical context.     NOTE: Troponin I testing is performed using a different   testing methodology at [facility] than at other   [facility]. Direct result comparisons should only   be made within the same method.   SERIAL TROPONIN, 1 HOUR - Normal    Troponin I, High Sensitivity 8      Narrative:     Less than 99th percentile of normal range cutoff-  Female and children under 18 years old <14 ng/L; Male <21 ng/L: Negative  Repeat testing should be performed if clinically indicated.     Female and children under 18 years old 14-50 ng/L; Male 21-50 ng/L:  Consistent with  possible cardiac damage and possible increased clinical   risk. Serial measurements may help to assess extent of myocardial damage.     >50 ng/L: Consistent with cardiac damage, increased clinical risk and  myocardial infarction. Serial measurements may help assess extent of   myocardial damage.      NOTE: Children less than 1 year old may have higher baseline troponin   levels and results should be interpreted in conjunction with the overall   clinical context.     NOTE: Troponin I testing is performed using a different   testing methodology at [facility] than at other   [facility]. Direct result comparisons should only   be made within the same method.   TSH WITH REFLEX TO FREE T4 IF ABNORMAL - Normal    Thyroid Stimulating Hormone 1.65      Narrative:     TSH testing is performed using different testing methodology at [facility] than at other [facility]. Direct result comparisons should only be made within the same method.     TROPONIN SERIES- (INITIAL, 1 HR)    Narrative:     The following orders were created for panel order Troponin I Series, High Sensitivity (0, 1 HR).  Procedure                               Abnormality         Status                     ---------                               -----------         ------                     Troponin I, High Sensiti...[[id number]]  Normal              Final result               Troponin, High Sensitivi...[[id number]]  Normal              Final result                 Please view results for these tests on the individual orders.         XR chest 1 view   Final Result   Normal heart size with no radiographic signs of active pulmonary   parenchymal infiltration.  Findings similar to the prior.   Signed by Candy Vines DO      Holter Or Event Cardiac Monitor    (Results Pending)   Transthoracic echo (TTE) complete    (Results Pending)         CHADS2 Score: 2       [place] Coma Scale Score: 15                   Procedure  Critical Care    Performed by: Anna Marie Reyes MD  Authorized by: Anna Marie Reyes MD    Critical care provider statement:     Critical care time (minutes):  35    Critical care time was exclusive of:  Separately billable procedures and treating other patients and teaching time    Critical care was necessary to treat or prevent imminent or life-threatening deterioration of the following conditions: arrhythmia.    Critical care was time spent personally by me on the following activities:  Development of treatment plan with patient or surrogate, discussions with consultants, evaluation of patient's response to treatment, examination of patient, obtaining history from patient or surrogate, ordering and performing treatments and interventions, ordering and review of laboratory studies, ordering and review of radiographic studies, pulse oximetry, re-evaluation of patient's condition and review of old charts    Care discussed with: admitting provider        ED Course & MDM   Assessment/Plan:   Rui Shah is a 68 y.o. male with a history of HTN, HLD, DM, depression, presents with chest pain and palpitations.  He is notably tachycardic, SVT versus atrial flutter on ECG.  I attempted a modified Valsalva maneuver with him but heart rate incremented immediately following this back to the 130s.  He is otherwise hemodynamically stable.  He was given 6 mg IV adenosine and converted back to sinus rhythm making SVT more likely.  I am concerned about the recurrent episodes as well as chest pain he has been having over the past 3 days so will engage cardiology.  Metabolic and cardiac workup was sent with labs, TSH, chest x-ray. See below for details of ED course and ultimate disposition.    Medications   adenosine (Adenocard) injection 6 mg (6 mg intravenous Given 6/5/24 1408)        ED Course as of 06/05/24 2119 Wed Jun 05, 2024   1307 ECG read interpreted by me.  Normal sinus rhythm, rate 94.  Leftward  axis.  Normal intervals.  No ST or T wave derangements. [CG]   1318 ECG read interpreted by me.  SVT, rate 134.  Left axis deviation.  Normal intervals.  No ST or T wave derangements. [CG]   1549 ECG read interpreted by me (post adenosine): Normal sinus rhythm, rate 81.  Leftward axis.  Normal intervals.  No ST or T wave derangements. [CG]   1550 Labs are notable for CBC without leukocytosis, normal H&H, normal platelets, CMP with mildly low bicarb 20, mildly elevated BUN 24 with mildly elevated creatinine 1.21, normal LFTs, negative troponin x 2, normal TSH, mildly elevated , normal magnesium. [CG]   1550 Chest x-ray shows normal heart size, no acute cardiopulmonary process [CG]   1550 Patient seen by Dr. Quigley cardiology who cleared the patient for discharge.  We set him up for a holter monitor. They advised him on vagal maneuvers and PRN diltiazem.   He will follow-up with cardiology within 6 weeks.  A holter monitor was placed for cardiology to follow. He will continue his current medications.  Patient was discharged home with anticipatory guidance and strict return precautions. [CG]      ED Course User Index  [CG] Anna Marie Reyes MD         Diagnoses as of 06/05/24 2119   SVT (supraventricular tachycardia) (CMS-[facility])       Disposition:   DISCHARGE.  The patient was discharged with both verbal and written anticipatory guidance and strict return precautions. Discharge diagnosis, instructions and plan were discussed and understood. I emphasized the importance of following up with their primary care provider within 24-48 hours and with any referrals in the timeframe recommended. At the time of discharge the patient was comfortable and was in no apparent distress. Patient is aware of diagnostic uncertainty and was notified though testing is negative here, there is a very small chance that pathology may be missed.  The patient understands these risks and the patient/family understood to call 911 or return  immediately to the emergency department if the symptoms worsen or if they have any additional concerns.      FOLLOW UP  Primary care provider in 1-2 days.      ED Prescriptions       Medication Sig Dispense Start Date End Date Auth. Provider    dilTIAZem (Cardizem) 120 mg immediate release tablet Take 1 tablet (120 mg) by mouth 4 times a day as needed (Palpitations). 120 tablet 6/5/2024 6/5/2025 MD Anna Marie Figueredo MD  EM/IM/Peds    This note was dictated by speech recognition. Minor errors in transcription may be present.     Anna Marie Reyes MD  06/05/24 7522

## 2024-06-05 NOTE — PROGRESS NOTES
Transitional Care Coordination Progress Note:  Plan per Medical/Surgical team: treatment of CP, ?new afib with ASA, nitrostat  Status: ED  Payor source: [employer] medicare ADV  Discharge disposition: Home with wife   Potential Barriers: anticoagulation plan  trop neg x 1,  to 84 with adenosine  ADOD: 6/6/2024   AQUILINO Alana Crouch RN, BSN Transitional Care Coordinator ED# [phone]      06/05/24 1417   Discharge Planning   Living Arrangements Spouse/significant other   Support Systems Spouse/significant other   Assistance Needed cardio work up   Type of Residence Private residence   Number of Stairs to Enter Residence 2   Number of Stairs Within Residence 0   Home or Post Acute Services None   Patient expects to be discharged to: Home with wife   Does the patient need discharge transport arranged? No   Financial Resource Strain   How hard is it for you to pay for the very basics like food, housing, medical care, and heating? Not hard   Housing Stability   In the last 12 months, was there a time when you were not able to pay the mortgage or rent on time? N   In the last 12 months, how many places have you lived? 1   In the last 12 months, was there a time when you did not have a steady place to sleep or slept in a shelter (including now)? N   Transportation Needs   In the past 12 months, has lack of transportation kept you from medical appointments or from getting medications? no   In the past 12 months, has lack of transportation kept you from meetings, work, or from getting things needed for daily living? No

## 2024-06-05 NOTE — PROGRESS NOTES
Home with wife      06/05/24 7993   Current Planned Discharge Disposition   Current Planned Discharge Disposition Home

## 2024-06-06 LAB — BODY SURFACE AREA: 2.35 M2

## 2024-06-14 ENCOUNTER — HOSPITAL ENCOUNTER (OUTPATIENT)
Dept: CARDIOLOGY | Facility: HOSPITAL | Age: 69
Discharge: HOME | End: 2024-06-14
Payer: MEDICARE

## 2024-06-14 DIAGNOSIS — R00.2 HEART PALPITATIONS: ICD-10-CM

## 2024-06-14 DIAGNOSIS — I47.19 NARROW COMPLEX TACHYCARDIA (CMS-HCC): ICD-10-CM

## 2024-06-14 LAB
EJECTION FRACTION APICAL 4 CHAMBER: 58.1
LEFT ATRIUM VOLUME AREA LENGTH INDEX BSA: 20.3 ML/M2
LEFT VENTRICLE INTERNAL DIMENSION DIASTOLE: 5.41 CM (ref 3.5–6)
LEFT VENTRICULAR OUTFLOW TRACT DIAMETER: 2.62 CM
LV EJECTION FRACTION BIPLANE: 57 %
MITRAL VALVE E/A RATIO: 0.86
RIGHT VENTRICLE FREE WALL PEAK S': 12.2 CM/S
RIGHT VENTRICLE PEAK SYSTOLIC PRESSURE: 23.8 MMHG
TRICUSPID ANNULAR PLANE SYSTOLIC EXCURSION: 1.8 CM

## 2024-06-14 PROCEDURE — 93306 TTE W/DOPPLER COMPLETE: CPT

## 2024-06-17 ENCOUNTER — APPOINTMENT (OUTPATIENT)
Dept: PODIATRY | Facility: CLINIC | Age: 69
End: 2024-06-17
Payer: MEDICARE

## 2024-06-24 DIAGNOSIS — E11.40 TYPE 2 DIABETES MELLITUS WITH DIABETIC NEUROPATHY, WITHOUT LONG-TERM CURRENT USE OF INSULIN (MULTI): ICD-10-CM

## 2024-06-24 RX ORDER — EMPAGLIFLOZIN 25 MG/1
25 TABLET, FILM COATED ORAL DAILY
Qty: 90 TABLET | Refills: 0 | Status: SHIPPED | OUTPATIENT
Start: 2024-06-24

## 2024-07-01 LAB — BODY SURFACE AREA: 2.35 M2

## 2024-07-05 DIAGNOSIS — N40.0 BENIGN PROSTATIC HYPERPLASIA, UNSPECIFIED WHETHER LOWER URINARY TRACT SYMPTOMS PRESENT: ICD-10-CM

## 2024-07-08 LAB — BODY SURFACE AREA: 2.35 M2

## 2024-07-08 RX ORDER — TAMSULOSIN HYDROCHLORIDE 0.4 MG/1
0.8 CAPSULE ORAL NIGHTLY
Qty: 180 CAPSULE | Refills: 1 | Status: SHIPPED | OUTPATIENT
Start: 2024-07-08

## 2024-07-11 PROBLEM — K21.9 GASTRIC REFLUX: Status: ACTIVE | Noted: 2024-07-11

## 2024-07-11 PROBLEM — R53.83 FATIGUE: Status: ACTIVE | Noted: 2024-07-11

## 2024-07-11 PROBLEM — I10 HYPERTENSION: Status: ACTIVE | Noted: 2024-07-11

## 2024-07-12 ENCOUNTER — OFFICE VISIT (OUTPATIENT)
Dept: CARDIOLOGY | Facility: HOSPITAL | Age: 69
End: 2024-07-12
Payer: MEDICARE

## 2024-07-12 VITALS
SYSTOLIC BLOOD PRESSURE: 132 MMHG | HEIGHT: 72 IN | DIASTOLIC BLOOD PRESSURE: 92 MMHG | WEIGHT: 230 LBS | BODY MASS INDEX: 31.15 KG/M2 | HEART RATE: 75 BPM

## 2024-07-12 DIAGNOSIS — Z01.818 PREOP TESTING: ICD-10-CM

## 2024-07-12 DIAGNOSIS — I47.10 SVT (SUPRAVENTRICULAR TACHYCARDIA) (CMS-HCC): ICD-10-CM

## 2024-07-12 DIAGNOSIS — R00.2 HEART PALPITATIONS: ICD-10-CM

## 2024-07-12 DIAGNOSIS — I47.19 NARROW COMPLEX TACHYCARDIA (CMS-HCC): ICD-10-CM

## 2024-07-12 LAB
ATRIAL RATE: 89 BPM
P AXIS: 53 DEGREES
P OFFSET: 179 MS
P ONSET: 114 MS
PR INTERVAL: 188 MS
Q ONSET: 208 MS
QRS COUNT: 15 BEATS
QRS DURATION: 106 MS
QT INTERVAL: 380 MS
QTC CALCULATION(BAZETT): 462 MS
QTC FREDERICIA: 433 MS
R AXIS: -35 DEGREES
T AXIS: 59 DEGREES
T OFFSET: 398 MS
VENTRICULAR RATE: 89 BPM

## 2024-07-12 PROCEDURE — 4010F ACE/ARB THERAPY RXD/TAKEN: CPT | Performed by: INTERNAL MEDICINE

## 2024-07-12 PROCEDURE — 3080F DIAST BP >= 90 MM HG: CPT | Performed by: INTERNAL MEDICINE

## 2024-07-12 PROCEDURE — 99205 OFFICE O/P NEW HI 60 MIN: CPT | Performed by: INTERNAL MEDICINE

## 2024-07-12 PROCEDURE — 99215 OFFICE O/P EST HI 40 MIN: CPT | Performed by: INTERNAL MEDICINE

## 2024-07-12 PROCEDURE — 93005 ELECTROCARDIOGRAM TRACING: CPT | Performed by: INTERNAL MEDICINE

## 2024-07-12 PROCEDURE — 1159F MED LIST DOCD IN RCRD: CPT | Performed by: INTERNAL MEDICINE

## 2024-07-12 PROCEDURE — 3075F SYST BP GE 130 - 139MM HG: CPT | Performed by: INTERNAL MEDICINE

## 2024-07-12 ASSESSMENT — COLUMBIA-SUICIDE SEVERITY RATING SCALE - C-SSRS
2. HAVE YOU ACTUALLY HAD ANY THOUGHTS OF KILLING YOURSELF?: NO
1. IN THE PAST MONTH, HAVE YOU WISHED YOU WERE DEAD OR WISHED YOU COULD GO TO SLEEP AND NOT WAKE UP?: NO
6. HAVE YOU EVER DONE ANYTHING, STARTED TO DO ANYTHING, OR PREPARED TO DO ANYTHING TO END YOUR LIFE?: NO

## 2024-07-12 ASSESSMENT — PATIENT HEALTH QUESTIONNAIRE - PHQ9
SUM OF ALL RESPONSES TO PHQ9 QUESTIONS 1 & 2: 0
2. FEELING DOWN, DEPRESSED OR HOPELESS: NOT AT ALL
1. LITTLE INTEREST OR PLEASURE IN DOING THINGS: NOT AT ALL

## 2024-07-12 NOTE — PROGRESS NOTES
I had the pleasure seeing Rui Shah     Chief Complaint   Patient presents with    SVT (Supraventricular Tachycardia)    VT (Ventricular Tachycardia)     OUTPATIENT CONSULTATION: Cardiac Electrophysiology  DOS: July 12, 2024  REASON: Sustained SVT  REFERRING:  Husam Quigley MD ([facility])         Current Outpatient Medications   Medication Instructions    albuterol 90 mcg/actuation inhaler 2 puffs, inhalation, Every 4 hours PRN    azelastine (Astelin) 137 mcg (0.1 %) nasal spray 1 spray, Each Nostril, 2 times daily, Use in each nostril as directed    blood sugar diagnostic strip Use to check blood glucose up to three times daily as directed    budesonide-formoteroL (Symbicort) 160-4.5 mcg/actuation inhaler 2 puffs, inhalation, 2 times daily    buPROPion XL (WELLBUTRIN XL) 150 mg, oral, Every morning, Do not crush, chew, or split.    dilTIAZem (CARDIZEM) 120 mg, oral, 4 times daily PRN    dilTIAZem ER (Tiazac) 240 mg 24 hr capsule 1 capsule, oral, Daily    esomeprazole (NEXIUM) 40 mg, oral, Daily before breakfast    glimepiride (AMARYL) 2 mg, oral, Daily before breakfast    glucose 16 g, oral, As needed    Jardiance 25 mg, oral, Daily    losartan (COZAAR) 25 mg, oral, Daily    metFORMIN XR (GLUCOPHAGE-XR) 1,000 mg, oral, 2 times daily, TAKE 1 TABLET DAILY WITH BREAKFAST, AND TAKE 2 TABLETS DAILY WITH DINNER    nitrofurantoin, macrocrystal-monohydrate, (Macrobid) 100 mg capsule     ondansetron (ZOFRAN) 4 mg, oral, Every 8 hours PRN    rosuvastatin (CRESTOR) 10 mg, oral, Daily    sodium,potassium,mag sulfates (Suprep Bowel Prep Kit) 17.5-3.13-1.6 gram recon soln solution Take one bottle twice as directed by the prep instructions    tamsulosin (FLOMAX) 0.8 mg, oral, Nightly    venlafaxine XR (Effexor-XR) 75 mg 24 hr capsule TAKE 3 CAPSULES (225 MG) BY MOUTH EVERY DAY      Rui Shah is a 68 y.o. with:     HTN, DMT2, GERD  Asthma, MARIELA  Palpitations  Narrow complex tachycardia / SVT  Non-sustained VT       [place]  2024:  (6/5) Pt arrived to [facility] with tachycardia.  EKG: Narrow Complex Short RP Tachycardia. 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 He converted with 6 mg IV Adenosine.        TESTING    -ECHO/ TTE:  (6/14/24) LVEF 50-55%.  LV impaired relaxation pattern.  Mild-mod AR.      -Monitor:  Cardiac Diagnostics 14 days (June 2024) showed Tachycardia ([place] 17%), 25 runs SVT longest lasting 41m 16s and 5 runs VT longest lasting 15.5s.  HR range  bpm with Avg HR 80 bpm.  Rare SVEs and VEs ([place] <1%).          Objective   Physical Exam  BP (!) 132/92 (BP Location: Right arm)   Pulse 75   Ht 1.829 m (6')   Wt 104 kg (230 lb)   BMI 31.19 kg/m²     General Appearance:  Alert, cooperative, no distress, appears stated age   Head:  Normocephalic, without obvious abnormality, atraumatic   Eyes:  PERRL, conjunctiva/corneas clear, EOM's intact, fundi benign, both eyes   Ears:  Normal TM's and external ear canals, both ears   Nose: Nares normal, septum midline, mucosa normal, no drainage or sinus tenderness   Throat: Lips, mucosa, and tongue normal; teeth and gums normal   Neck: Supple, symmetrical, trachea midline, no adenopathy, thyroid: not enlarged, symmetric, no tenderness/mass/nodules, no carotid bruit or JVD   Back:   Symmetric, no curvature, ROM normal, no CVA tenderness   Lungs:   Clear to auscultation bilaterally, respirations unlabored   Chest Wall:  No tenderness or deformity   Heart:  Regular rate and rhythm, S1, S2 normal, no murmur, rub or gallop   Abdomen:   Soft, non-tender, bowel sounds active all four quadrants,  no masses, no organomegaly   Genitalia:  Normal male   Rectal:  Normal tone, normal prostate, no masses or tenderness;  guaiac negative stool   Extremities: Extremities normal, atraumatic, no cyanosis or edema   Pulses: 2+ and symmetric   Skin: Skin color, texture, turgor normal, no rashes or lesions   Lymph nodes: Cervical, supraclavicular, and axillary nodes normal   Neurologic: Normal            Assessment/Plan   SVT - he has had several episodes so far. They are associated with lightheadedness, dizziness and presyncope. Does not like taking the Diltiazem.     I did discuss with him the episodes of SVT. I discussed with him option of 1. Observation 2. Medical therapy and 3. RFA of SVT. He prefers definitive solution. We are proceeding with RFA on September 24th, 2024

## 2024-07-16 DIAGNOSIS — E11.40 TYPE 2 DIABETES MELLITUS WITH DIABETIC NEUROPATHY, WITHOUT LONG-TERM CURRENT USE OF INSULIN (MULTI): ICD-10-CM

## 2024-07-16 RX ORDER — BLOOD SUGAR DIAGNOSTIC
STRIP MISCELLANEOUS
Qty: 200 STRIP | Refills: 3 | Status: SHIPPED | OUTPATIENT
Start: 2024-07-16

## 2024-07-24 DIAGNOSIS — E11.9 TYPE 2 DIABETES MELLITUS WITHOUT COMPLICATION, WITHOUT LONG-TERM CURRENT USE OF INSULIN (MULTI): ICD-10-CM

## 2024-07-24 DIAGNOSIS — I47.19 NARROW COMPLEX TACHYCARDIA (CMS-HCC): Primary | ICD-10-CM

## 2024-07-25 ENCOUNTER — APPOINTMENT (OUTPATIENT)
Dept: PHARMACY | Facility: HOSPITAL | Age: 69
End: 2024-07-25
Payer: MEDICARE

## 2024-07-25 DIAGNOSIS — E11.9 TYPE 2 DIABETES MELLITUS WITHOUT COMPLICATION, WITHOUT LONG-TERM CURRENT USE OF INSULIN (MULTI): ICD-10-CM

## 2024-07-25 RX ORDER — METFORMIN HYDROCHLORIDE 500 MG/1
1000 TABLET, EXTENDED RELEASE ORAL 2 TIMES DAILY
Qty: 360 TABLET | Refills: 1 | Status: SHIPPED | OUTPATIENT
Start: 2024-07-25 | End: 2024-07-26

## 2024-07-25 RX ORDER — DILTIAZEM HYDROCHLORIDE 240 MG/1
240 CAPSULE, EXTENDED RELEASE ORAL DAILY
Qty: 90 CAPSULE | Refills: 1 | Status: SHIPPED | OUTPATIENT
Start: 2024-07-25

## 2024-07-26 DIAGNOSIS — E11.9 TYPE 2 DIABETES MELLITUS WITHOUT COMPLICATION, WITHOUT LONG-TERM CURRENT USE OF INSULIN (MULTI): ICD-10-CM

## 2024-07-26 RX ORDER — METFORMIN HYDROCHLORIDE 500 MG/1
TABLET, EXTENDED RELEASE ORAL
Qty: 360 TABLET | Refills: 1 | Status: SHIPPED | OUTPATIENT
Start: 2024-07-26

## 2024-07-26 RX ORDER — METFORMIN HYDROCHLORIDE 500 MG/1
TABLET, EXTENDED RELEASE ORAL
Qty: 360 TABLET | Refills: 1 | Status: SHIPPED | OUTPATIENT
Start: 2024-07-26 | End: 2024-07-26

## 2024-08-29 ENCOUNTER — TELEPHONE (OUTPATIENT)
Dept: PREADMISSION TESTING | Facility: HOSPITAL | Age: 69
End: 2024-08-29
Payer: MEDICARE

## 2024-09-03 ENCOUNTER — LAB (OUTPATIENT)
Dept: LAB | Facility: LAB | Age: 69
End: 2024-09-03
Payer: MEDICARE

## 2024-09-03 DIAGNOSIS — I47.10 SVT (SUPRAVENTRICULAR TACHYCARDIA) (CMS-HCC): ICD-10-CM

## 2024-09-03 DIAGNOSIS — Z01.818 PREOP TESTING: ICD-10-CM

## 2024-09-03 DIAGNOSIS — E11.40 TYPE 2 DIABETES MELLITUS WITH DIABETIC NEUROPATHY, WITHOUT LONG-TERM CURRENT USE OF INSULIN (MULTI): ICD-10-CM

## 2024-09-03 DIAGNOSIS — N30.01 ACUTE CYSTITIS WITH HEMATURIA: ICD-10-CM

## 2024-09-03 LAB
ANION GAP SERPL CALC-SCNC: 17 MMOL/L (ref 10–20)
APPEARANCE UR: CLEAR
BILIRUB UR STRIP.AUTO-MCNC: NEGATIVE MG/DL
BUN SERPL-MCNC: 19 MG/DL (ref 6–23)
CALCIUM SERPL-MCNC: 8.2 MG/DL (ref 8.6–10.3)
CHLORIDE SERPL-SCNC: 101 MMOL/L (ref 98–107)
CO2 SERPL-SCNC: 25 MMOL/L (ref 21–32)
COLOR UR: ABNORMAL
CREAT SERPL-MCNC: 0.93 MG/DL (ref 0.5–1.3)
EGFRCR SERPLBLD CKD-EPI 2021: 89 ML/MIN/1.73M*2
ERYTHROCYTE [DISTWIDTH] IN BLOOD BY AUTOMATED COUNT: 15.4 % (ref 11.5–14.5)
EST. AVERAGE GLUCOSE BLD GHB EST-MCNC: 154 MG/DL
GLUCOSE SERPL-MCNC: 140 MG/DL (ref 74–99)
GLUCOSE UR STRIP.AUTO-MCNC: ABNORMAL MG/DL
HBA1C MFR BLD: 7 %
HCT VFR BLD AUTO: 44.6 % (ref 41–52)
HGB BLD-MCNC: 14.3 G/DL (ref 13.5–17.5)
KETONES UR STRIP.AUTO-MCNC: NEGATIVE MG/DL
LEUKOCYTE ESTERASE UR QL STRIP.AUTO: NEGATIVE
MCH RBC QN AUTO: 27.2 PG (ref 26–34)
MCHC RBC AUTO-ENTMCNC: 32.1 G/DL (ref 32–36)
MCV RBC AUTO: 85 FL (ref 80–100)
NITRITE UR QL STRIP.AUTO: NEGATIVE
NRBC BLD-RTO: 0 /100 WBCS (ref 0–0)
PH UR STRIP.AUTO: 6.5 [PH]
PLATELET # BLD AUTO: 228 X10*3/UL (ref 150–450)
POTASSIUM SERPL-SCNC: 4.5 MMOL/L (ref 3.5–5.3)
PROT UR STRIP.AUTO-MCNC: ABNORMAL MG/DL
RBC # BLD AUTO: 5.25 X10*6/UL (ref 4.5–5.9)
RBC # UR STRIP.AUTO: ABNORMAL /UL
RBC #/AREA URNS AUTO: >20 /HPF
SODIUM SERPL-SCNC: 138 MMOL/L (ref 136–145)
SP GR UR STRIP.AUTO: 1.03
UROBILINOGEN UR STRIP.AUTO-MCNC: NORMAL MG/DL
WBC # BLD AUTO: 5.6 X10*3/UL (ref 4.4–11.3)
WBC #/AREA URNS AUTO: ABNORMAL /HPF

## 2024-09-03 PROCEDURE — 80048 BASIC METABOLIC PNL TOTAL CA: CPT

## 2024-09-03 PROCEDURE — 36415 COLL VENOUS BLD VENIPUNCTURE: CPT

## 2024-09-03 PROCEDURE — 81001 URINALYSIS AUTO W/SCOPE: CPT

## 2024-09-03 PROCEDURE — 85027 COMPLETE CBC AUTOMATED: CPT

## 2024-09-03 PROCEDURE — 83036 HEMOGLOBIN GLYCOSYLATED A1C: CPT

## 2024-09-20 DIAGNOSIS — F32.0 DEPRESSION, MAJOR, SINGLE EPISODE, MILD (CMS-HCC): ICD-10-CM

## 2024-09-20 DIAGNOSIS — E11.40 TYPE 2 DIABETES MELLITUS WITH DIABETIC NEUROPATHY, WITHOUT LONG-TERM CURRENT USE OF INSULIN: ICD-10-CM

## 2024-09-20 RX ORDER — VENLAFAXINE HYDROCHLORIDE 75 MG/1
150 CAPSULE, EXTENDED RELEASE ORAL DAILY
Qty: 180 CAPSULE | Refills: 1 | Status: SHIPPED | OUTPATIENT
Start: 2024-09-20

## 2024-09-20 RX ORDER — EMPAGLIFLOZIN 25 MG/1
25 TABLET, FILM COATED ORAL DAILY
Qty: 90 TABLET | Refills: 0 | Status: SHIPPED | OUTPATIENT
Start: 2024-09-20

## 2024-09-23 NOTE — H&P
History Of Present Illness  Rui Shah is a 69 y.o. male presenting with PMH of    HTN, DMT2, GERD  Asthma, MARIELA  Palpitations  Narrow complex tachycardia / SVT  Non-sustained VT      June 2024:  (6/5) Pt arrived to [facility] with tachycardia.  EKG: Narrow Complex Short RP Tachycardia. 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 He converted with 6 mg IV Adenosine.     TESTING  - ECHO/ TTE:  (6/14/24) LVEF 50-55%.  LV impaired relaxation pattern.  Mild-mod AR.    - Monitor:  Cardiac Diagnostics 14 days (June 2024) showed Tachycardia ([place] 17%), 25 runs SVT longest lasting 41m 16s and 5 runs VT longest lasting 15.5s.  HR range  bpm with Avg HR 80 bpm.  Rare SVEs and VEs ([place] <1%).       Past Medical History  Past Medical History:   Diagnosis Date    Abdominal pain 02/06/2023    Acute sinusitis, unspecified 02/06/2023    Chronic pansinusitis 02/06/2023    Diabetes (Multi)     Facial pressure 02/06/2023    Persistent vomiting 02/06/2023    Personal history of other diseases of the circulatory system 12/20/2019    History of hypertension    Personal history of other diseases of the respiratory system 12/20/2019    History of asthma    Refractory sinusitis 02/06/2023    Routine general medical examination at health care facility 11/28/2023    Snoring 03/06/2023       Surgical History  Past Surgical History:   Procedure Laterality Date    ANKLE SURGERY  07/24/2018    Ankle Surgery    BACK SURGERY  07/24/2018    Back Surgery    SHOULDER SURGERY  07/24/2018    Shoulder Surgery    SINUS SURGERY  07/24/2018    Sinus Surgery        Social History  He reports that he has never smoked. He has never been exposed to tobacco smoke. He has never used smokeless tobacco. He reports that he does not drink alcohol and does not use drugs.    Family History  Family History   Problem Relation Name Age of Onset    Arthritis Mother          Allergies  Gabapentin    Review of Systems   All other systems reviewed and are negative.        Physical Exam  Vitals and nursing note reviewed.   Cardiovascular:      Rate and Rhythm: Normal rate and regular rhythm.      Pulses: Normal pulses.      Heart sounds: Normal heart sounds.   Pulmonary:      Effort: Pulmonary effort is normal.      Breath sounds: Normal breath sounds.   Skin:     General: Skin is warm.   Neurological:      General: No focal deficit present.      Mental Status: He is oriented to person, place, and time. Mental status is at baseline.          Last Recorded Vitals  There were no vitals taken for this visit.    Relevant Results         Assessment/Plan   Assessment & Plan      Short RP' tachycardia terminated by Adenosine IV       I spent 60 minutes in the professional and overall care of this patient.      Boni Francis MD

## 2024-09-24 ENCOUNTER — HOSPITAL ENCOUNTER (OUTPATIENT)
Facility: HOSPITAL | Age: 69
Discharge: HOME | End: 2024-09-25
Attending: INTERNAL MEDICINE | Admitting: INTERNAL MEDICINE
Payer: MEDICARE

## 2024-09-24 DIAGNOSIS — E11.9 CONTROLLED TYPE 2 DIABETES MELLITUS WITHOUT COMPLICATION, WITHOUT LONG-TERM CURRENT USE OF INSULIN (MULTI): ICD-10-CM

## 2024-09-24 DIAGNOSIS — I47.10 SVT (SUPRAVENTRICULAR TACHYCARDIA) (CMS-HCC): ICD-10-CM

## 2024-09-24 PROBLEM — J44.9 CHRONIC OBSTRUCTIVE PULMONARY DISEASE (MULTI): Status: ACTIVE | Noted: 2024-09-24

## 2024-09-24 PROBLEM — I47.19 AVNRT (AV NODAL RE-ENTRY TACHYCARDIA) (CMS-HCC): Status: ACTIVE | Noted: 2024-09-24

## 2024-09-24 LAB — GLUCOSE BLD MANUAL STRIP-MCNC: 98 MG/DL (ref 74–99)

## 2024-09-24 PROCEDURE — C1730 CATH, EP, 19 OR FEW ELECT: HCPCS | Performed by: INTERNAL MEDICINE

## 2024-09-24 PROCEDURE — 2500000004 HC RX 250 GENERAL PHARMACY W/ HCPCS (ALT 636 FOR OP/ED): Performed by: INTERNAL MEDICINE

## 2024-09-24 PROCEDURE — 99153 MOD SED SAME PHYS/QHP EA: CPT | Performed by: INTERNAL MEDICINE

## 2024-09-24 PROCEDURE — 93653 COMPRE EP EVAL TX SVT: CPT | Performed by: INTERNAL MEDICINE

## 2024-09-24 PROCEDURE — 93005 ELECTROCARDIOGRAM TRACING: CPT | Mod: 59

## 2024-09-24 PROCEDURE — C1732 CATH, EP, DIAG/ABL, 3D/VECT: HCPCS | Performed by: INTERNAL MEDICINE

## 2024-09-24 PROCEDURE — 99152 MOD SED SAME PHYS/QHP 5/>YRS: CPT | Performed by: INTERNAL MEDICINE

## 2024-09-24 PROCEDURE — 93623 PRGRMD STIMJ&PACG IV RX NFS: CPT | Performed by: INTERNAL MEDICINE

## 2024-09-24 PROCEDURE — 93010 ELECTROCARDIOGRAM REPORT: CPT | Performed by: INTERNAL MEDICINE

## 2024-09-24 PROCEDURE — 7100000011 HC EXTENDED STAY RECOVERY HOURLY - NURSING UNIT

## 2024-09-24 PROCEDURE — 82947 ASSAY GLUCOSE BLOOD QUANT: CPT

## 2024-09-24 PROCEDURE — 2720000007 HC OR 272 NO HCPCS: Performed by: INTERNAL MEDICINE

## 2024-09-24 PROCEDURE — 2780000003 HC OR 278 NO HCPCS: Performed by: INTERNAL MEDICINE

## 2024-09-24 PROCEDURE — G0269 OCCLUSIVE DEVICE IN VEIN ART: HCPCS | Performed by: INTERNAL MEDICINE

## 2024-09-24 PROCEDURE — C1760 CLOSURE DEV, VASC: HCPCS | Performed by: INTERNAL MEDICINE

## 2024-09-24 PROCEDURE — 2500000001 HC RX 250 WO HCPCS SELF ADMINISTERED DRUGS (ALT 637 FOR MEDICARE OP): Performed by: STUDENT IN AN ORGANIZED HEALTH CARE EDUCATION/TRAINING PROGRAM

## 2024-09-24 PROCEDURE — 93620 COMP EP EVL R AT VEN PAC&REC: CPT | Performed by: INTERNAL MEDICINE

## 2024-09-24 PROCEDURE — C1887 CATHETER, GUIDING: HCPCS | Performed by: INTERNAL MEDICINE

## 2024-09-24 PROCEDURE — 76937 US GUIDE VASCULAR ACCESS: CPT | Performed by: INTERNAL MEDICINE

## 2024-09-24 RX ORDER — BUPROPION HYDROCHLORIDE 150 MG/1
150 TABLET ORAL EVERY MORNING
Status: DISCONTINUED | OUTPATIENT
Start: 2024-09-25 | End: 2024-09-25 | Stop reason: HOSPADM

## 2024-09-24 RX ORDER — ALBUTEROL SULFATE 90 UG/1
2 INHALANT RESPIRATORY (INHALATION) EVERY 4 HOURS PRN
Status: DISCONTINUED | OUTPATIENT
Start: 2024-09-24 | End: 2024-09-25 | Stop reason: HOSPADM

## 2024-09-24 RX ORDER — GLIMEPIRIDE 2 MG/1
2 TABLET ORAL
Status: DISCONTINUED | OUTPATIENT
Start: 2024-09-25 | End: 2024-09-25 | Stop reason: HOSPADM

## 2024-09-24 RX ORDER — ACETAMINOPHEN 500 MG
5 TABLET ORAL ONCE
Status: COMPLETED | OUTPATIENT
Start: 2024-09-24 | End: 2024-09-24

## 2024-09-24 RX ORDER — ROSUVASTATIN CALCIUM 20 MG/1
10 TABLET, COATED ORAL DAILY
Status: DISCONTINUED | OUTPATIENT
Start: 2024-09-25 | End: 2024-09-25 | Stop reason: HOSPADM

## 2024-09-24 RX ORDER — BUPIVACAINE HYDROCHLORIDE 5 MG/ML
INJECTION, SOLUTION EPIDURAL; INTRACAUDAL AS NEEDED
Status: DISCONTINUED | OUTPATIENT
Start: 2024-09-24 | End: 2024-09-24 | Stop reason: HOSPADM

## 2024-09-24 RX ORDER — FENTANYL CITRATE 50 UG/ML
INJECTION, SOLUTION INTRAMUSCULAR; INTRAVENOUS AS NEEDED
Status: DISCONTINUED | OUTPATIENT
Start: 2024-09-24 | End: 2024-09-24 | Stop reason: HOSPADM

## 2024-09-24 RX ORDER — DEXTROSE 50 % IN WATER (D50W) INTRAVENOUS SYRINGE
25
Status: DISCONTINUED | OUTPATIENT
Start: 2024-09-24 | End: 2024-09-25 | Stop reason: HOSPADM

## 2024-09-24 RX ORDER — METFORMIN HYDROCHLORIDE 500 MG/1
500 TABLET, EXTENDED RELEASE ORAL
Status: DISCONTINUED | OUTPATIENT
Start: 2024-09-25 | End: 2024-09-25 | Stop reason: HOSPADM

## 2024-09-24 RX ORDER — FLUTICASONE FUROATE AND VILANTEROL 200; 25 UG/1; UG/1
1 POWDER RESPIRATORY (INHALATION)
Status: DISCONTINUED | OUTPATIENT
Start: 2024-09-25 | End: 2024-09-25 | Stop reason: HOSPADM

## 2024-09-24 RX ORDER — DEXTROSE 50 % IN WATER (D50W) INTRAVENOUS SYRINGE
12.5
Status: DISCONTINUED | OUTPATIENT
Start: 2024-09-24 | End: 2024-09-25 | Stop reason: HOSPADM

## 2024-09-24 RX ORDER — MIDAZOLAM HYDROCHLORIDE 1 MG/ML
INJECTION, SOLUTION INTRAMUSCULAR; INTRAVENOUS AS NEEDED
Status: DISCONTINUED | OUTPATIENT
Start: 2024-09-24 | End: 2024-09-24 | Stop reason: HOSPADM

## 2024-09-24 RX ORDER — AZELASTINE 1 MG/ML
1 SPRAY, METERED NASAL 2 TIMES DAILY
Status: DISCONTINUED | OUTPATIENT
Start: 2024-09-24 | End: 2024-09-25 | Stop reason: HOSPADM

## 2024-09-24 RX ORDER — VENLAFAXINE HYDROCHLORIDE 150 MG/1
150 CAPSULE, EXTENDED RELEASE ORAL DAILY
Status: DISCONTINUED | OUTPATIENT
Start: 2024-09-25 | End: 2024-09-25 | Stop reason: HOSPADM

## 2024-09-24 RX ORDER — LOSARTAN POTASSIUM 25 MG/1
25 TABLET ORAL DAILY
Status: DISCONTINUED | OUTPATIENT
Start: 2024-09-25 | End: 2024-09-25 | Stop reason: HOSPADM

## 2024-09-24 RX ORDER — TAMSULOSIN HYDROCHLORIDE 0.4 MG/1
0.8 CAPSULE ORAL NIGHTLY
Status: DISCONTINUED | OUTPATIENT
Start: 2024-09-24 | End: 2024-09-25 | Stop reason: HOSPADM

## 2024-09-24 RX ORDER — ROSUVASTATIN CALCIUM 10 MG/1
10 TABLET, COATED ORAL DAILY
Qty: 90 TABLET | Refills: 3 | Status: SHIPPED | OUTPATIENT
Start: 2024-09-24

## 2024-09-24 RX ORDER — PANTOPRAZOLE SODIUM 40 MG/1
40 TABLET, DELAYED RELEASE ORAL
Status: DISCONTINUED | OUTPATIENT
Start: 2024-09-25 | End: 2024-09-25 | Stop reason: HOSPADM

## 2024-09-24 SDOH — SOCIAL STABILITY: SOCIAL INSECURITY: HAS ANYONE EVER THREATENED TO HURT YOUR FAMILY OR YOUR PETS?: NO

## 2024-09-24 SDOH — SOCIAL STABILITY: SOCIAL INSECURITY: DOES ANYONE TRY TO KEEP YOU FROM HAVING/CONTACTING OTHER FRIENDS OR DOING THINGS OUTSIDE YOUR HOME?: NO

## 2024-09-24 SDOH — SOCIAL STABILITY: SOCIAL INSECURITY: ARE THERE ANY APPARENT SIGNS OF INJURIES/BEHAVIORS THAT COULD BE RELATED TO ABUSE/NEGLECT?: NO

## 2024-09-24 SDOH — SOCIAL STABILITY: SOCIAL INSECURITY: DO YOU FEEL ANYONE HAS EXPLOITED OR TAKEN ADVANTAGE OF YOU FINANCIALLY OR OF YOUR PERSONAL PROPERTY?: NO

## 2024-09-24 SDOH — SOCIAL STABILITY: SOCIAL INSECURITY: HAVE YOU HAD ANY THOUGHTS OF HARMING ANYONE ELSE?: NO

## 2024-09-24 SDOH — SOCIAL STABILITY: SOCIAL INSECURITY: HAVE YOU HAD THOUGHTS OF HARMING ANYONE ELSE?: NO

## 2024-09-24 SDOH — SOCIAL STABILITY: SOCIAL INSECURITY: DO YOU FEEL UNSAFE GOING BACK TO THE PLACE WHERE YOU ARE LIVING?: NO

## 2024-09-24 SDOH — SOCIAL STABILITY: SOCIAL INSECURITY: ARE YOU OR HAVE YOU BEEN THREATENED OR ABUSED PHYSICALLY, EMOTIONALLY, OR SEXUALLY BY ANYONE?: NO

## 2024-09-24 SDOH — SOCIAL STABILITY: SOCIAL INSECURITY: ABUSE: ADULT

## 2024-09-24 SDOH — SOCIAL STABILITY: SOCIAL INSECURITY: WERE YOU ABLE TO COMPLETE ALL THE BEHAVIORAL HEALTH SCREENINGS?: YES

## 2024-09-24 ASSESSMENT — COGNITIVE AND FUNCTIONAL STATUS - GENERAL
WALKING IN HOSPITAL ROOM: A LITTLE
DAILY ACTIVITIY SCORE: 24
MOBILITY SCORE: 22
CLIMB 3 TO 5 STEPS WITH RAILING: A LITTLE
PATIENT BASELINE BEDBOUND: NO

## 2024-09-24 ASSESSMENT — ACTIVITIES OF DAILY LIVING (ADL)
JUDGMENT_ADEQUATE_SAFELY_COMPLETE_DAILY_ACTIVITIES: YES
DRESSING YOURSELF: INDEPENDENT
LACK_OF_TRANSPORTATION: NO
FEEDING YOURSELF: INDEPENDENT
BATHING: INDEPENDENT
GROOMING: INDEPENDENT
HEARING - RIGHT EAR: FUNCTIONAL
WALKS IN HOME: INDEPENDENT
PATIENT'S MEMORY ADEQUATE TO SAFELY COMPLETE DAILY ACTIVITIES?: YES
TOILETING: INDEPENDENT
ADEQUATE_TO_COMPLETE_ADL: YES
HEARING - LEFT EAR: FUNCTIONAL

## 2024-09-24 ASSESSMENT — ENCOUNTER SYMPTOMS
DIZZINESS: 0
EYE REDNESS: 0
SHORTNESS OF BREATH: 0
DIARRHEA: 0
FREQUENCY: 0
CHILLS: 0
CONSTIPATION: 0
DYSURIA: 0
CONFUSION: 0
SINUS PAIN: 0
HEADACHES: 0
FEVER: 0
AGITATION: 0
PALPITATIONS: 1
EYE PAIN: 0
WHEEZING: 0
HEMATURIA: 0

## 2024-09-24 ASSESSMENT — LIFESTYLE VARIABLES
HOW OFTEN DO YOU HAVE 6 OR MORE DRINKS ON ONE OCCASION: NEVER
AUDIT-C TOTAL SCORE: 2
HOW OFTEN DO YOU HAVE A DRINK CONTAINING ALCOHOL: 2-4 TIMES A MONTH
SKIP TO QUESTIONS 9-10: 1
AUDIT-C TOTAL SCORE: 2
HOW MANY STANDARD DRINKS CONTAINING ALCOHOL DO YOU HAVE ON A TYPICAL DAY: 1 OR 2

## 2024-09-24 ASSESSMENT — COLUMBIA-SUICIDE SEVERITY RATING SCALE - C-SSRS
1. IN THE PAST MONTH, HAVE YOU WISHED YOU WERE DEAD OR WISHED YOU COULD GO TO SLEEP AND NOT WAKE UP?: NO
2. HAVE YOU ACTUALLY HAD ANY THOUGHTS OF KILLING YOURSELF?: NO
6. HAVE YOU EVER DONE ANYTHING, STARTED TO DO ANYTHING, OR PREPARED TO DO ANYTHING TO END YOUR LIFE?: NO

## 2024-09-24 ASSESSMENT — PATIENT HEALTH QUESTIONNAIRE - PHQ9
2. FEELING DOWN, DEPRESSED OR HOPELESS: NOT AT ALL
SUM OF ALL RESPONSES TO PHQ9 QUESTIONS 1 & 2: 0
1. LITTLE INTEREST OR PLEASURE IN DOING THINGS: NOT AT ALL

## 2024-09-24 NOTE — H&P
History Of Present Illness  Rui Shah is a 69 y.o. male presenting with PMH:   HTN, DMT2, GERD  Asthma, MARIELA  Palpitations  Narrow complex tachycardia / SVT  Non-sustained VT     He reports episodes of palpitations once a month. No chest pain, SOB or SWARTZ.     TESTING     -ECHO/ TTE:  (6/14/24) LVEF 50-55%.  LV impaired relaxation pattern.  Mild-mod AR.       -Monitor:  Cardiac Diagnostics 14 days (June 2024) showed Tachycardia ([place] 17%), 25 runs SVT longest lasting 41m 16s and 5 runs VT longest lasting 15.5s.  HR range  bpm with Avg HR 80 bpm.  Rare SVEs and VEs ([place] <1%).       Past Medical History  Past Medical History:   Diagnosis Date    Abdominal pain 02/06/2023    Acute sinusitis, unspecified 02/06/2023    Chronic pansinusitis 02/06/2023    Diabetes (Multi)     Facial pressure 02/06/2023    Persistent vomiting 02/06/2023    Personal history of other diseases of the circulatory system 12/20/2019    History of hypertension    Personal history of other diseases of the respiratory system 12/20/2019    History of asthma    Refractory sinusitis 02/06/2023    Routine general medical examination at health care facility 11/28/2023    Snoring 03/06/2023       Surgical History  Past Surgical History:   Procedure Laterality Date    ANKLE SURGERY  07/24/2018    Ankle Surgery    BACK SURGERY  07/24/2018    Back Surgery    SHOULDER SURGERY  07/24/2018    Shoulder Surgery    SINUS SURGERY  07/24/2018    Sinus Surgery        Social History  He reports that he has never smoked. He has never been exposed to tobacco smoke. He has never used smokeless tobacco. He reports that he does not drink alcohol and does not use drugs.    Family History  Family History   Problem Relation Name Age of Onset    Arthritis Mother          Allergies  Gabapentin    Review of Systems   Constitutional:  Negative for chills and fever.   HENT:  Negative for congestion and sinus pain.    Eyes:  Negative for pain and redness.    Respiratory:  Negative for shortness of breath and wheezing.    Cardiovascular:  Positive for palpitations. Negative for chest pain.   Gastrointestinal:  Negative for constipation and diarrhea.   Genitourinary:  Negative for dysuria, frequency and hematuria.   Neurological:  Negative for dizziness and headaches.   Psychiatric/Behavioral:  Negative for agitation and confusion.         Physical Exam  Gen: awake and alert, AAOx3  HEENT: normocephalic.  CV: RRR. No murmurs, gallops, rubs  Pulm: clear to auscultation bilaterally. No coarse lung sounds  Abd: soft, non-distended. Normal active bowel sounds  Ext: warm and well-perfused. No LE edema  Psych: appropriate affect  Neuro: grossly intact sensation and motor functions.      Last Recorded Vitals  There were no vitals taken for this visit.    Relevant Results         Assessment/Plan   Assessment & Plan  Chronic obstructive pulmonary disease (Multi)      Rui Shah is a 69 y.o. male presenting with PMH of HTN, DM and SVT who is here for SVT ablation.     PLAN for EP study and SVT ablation under moderate sedation.     I spent 30 minutes in the professional and overall care of this patient.      Mimi Garcia MD

## 2024-09-25 ENCOUNTER — APPOINTMENT (OUTPATIENT)
Dept: CARDIOLOGY | Facility: HOSPITAL | Age: 69
End: 2024-09-25
Payer: MEDICARE

## 2024-09-25 VITALS
HEIGHT: 73 IN | OXYGEN SATURATION: 98 % | RESPIRATION RATE: 18 BRPM | TEMPERATURE: 98.1 F | WEIGHT: 238.1 LBS | SYSTOLIC BLOOD PRESSURE: 136 MMHG | DIASTOLIC BLOOD PRESSURE: 86 MMHG | BODY MASS INDEX: 31.56 KG/M2 | HEART RATE: 69 BPM

## 2024-09-25 LAB
ATRIAL RATE: 87 BPM
GLUCOSE BLD MANUAL STRIP-MCNC: 57 MG/DL (ref 74–99)
P AXIS: 59 DEGREES
P OFFSET: 189 MS
P ONSET: 126 MS
PR INTERVAL: 168 MS
Q ONSET: 210 MS
QRS COUNT: 14 BEATS
QRS DURATION: 100 MS
QT INTERVAL: 390 MS
QTC CALCULATION(BAZETT): 469 MS
QTC FREDERICIA: 441 MS
R AXIS: -16 DEGREES
T AXIS: 62 DEGREES
T OFFSET: 405 MS
VENTRICULAR RATE: 87 BPM

## 2024-09-25 PROCEDURE — 7100000011 HC EXTENDED STAY RECOVERY HOURLY - NURSING UNIT

## 2024-09-25 PROCEDURE — 99238 HOSP IP/OBS DSCHRG MGMT 30/<: CPT | Performed by: INTERNAL MEDICINE

## 2024-09-25 PROCEDURE — 93005 ELECTROCARDIOGRAM TRACING: CPT

## 2024-09-25 PROCEDURE — 2500000001 HC RX 250 WO HCPCS SELF ADMINISTERED DRUGS (ALT 637 FOR MEDICARE OP): Performed by: STUDENT IN AN ORGANIZED HEALTH CARE EDUCATION/TRAINING PROGRAM

## 2024-09-25 PROCEDURE — 2500000002 HC RX 250 W HCPCS SELF ADMINISTERED DRUGS (ALT 637 FOR MEDICARE OP, ALT 636 FOR OP/ED): Performed by: STUDENT IN AN ORGANIZED HEALTH CARE EDUCATION/TRAINING PROGRAM

## 2024-09-25 RX ORDER — ACETAMINOPHEN 650 MG/1
650 SUPPOSITORY RECTAL EVERY 4 HOURS PRN
Status: DISCONTINUED | OUTPATIENT
Start: 2024-09-25 | End: 2024-09-25 | Stop reason: HOSPADM

## 2024-09-25 RX ORDER — ACETAMINOPHEN 325 MG/1
650 TABLET ORAL EVERY 4 HOURS PRN
Status: DISCONTINUED | OUTPATIENT
Start: 2024-09-25 | End: 2024-09-25 | Stop reason: HOSPADM

## 2024-09-25 RX ORDER — ACETAMINOPHEN 160 MG/5ML
650 SOLUTION ORAL EVERY 4 HOURS PRN
Status: DISCONTINUED | OUTPATIENT
Start: 2024-09-25 | End: 2024-09-25 | Stop reason: HOSPADM

## 2024-09-25 SDOH — ECONOMIC STABILITY: FOOD INSECURITY: WITHIN THE PAST 12 MONTHS, YOU WORRIED THAT YOUR FOOD WOULD RUN OUT BEFORE YOU GOT MONEY TO BUY MORE.: NEVER TRUE

## 2024-09-25 SDOH — SOCIAL STABILITY: SOCIAL INSECURITY
WITHIN THE LAST YEAR, HAVE YOU BEEN KICKED, HIT, SLAPPED, OR OTHERWISE PHYSICALLY HURT BY YOUR PARTNER OR EX-PARTNER?: NO

## 2024-09-25 SDOH — ECONOMIC STABILITY: INCOME INSECURITY: IN THE LAST 12 MONTHS, WAS THERE A TIME WHEN YOU WERE NOT ABLE TO PAY THE MORTGAGE OR RENT ON TIME?: NO

## 2024-09-25 SDOH — ECONOMIC STABILITY: HOUSING INSECURITY: AT ANY TIME IN THE PAST 12 MONTHS, WERE YOU HOMELESS OR LIVING IN A SHELTER (INCLUDING NOW)?: NO

## 2024-09-25 SDOH — SOCIAL STABILITY: SOCIAL INSECURITY: WITHIN THE LAST YEAR, HAVE YOU BEEN AFRAID OF YOUR PARTNER OR EX-PARTNER?: NO

## 2024-09-25 SDOH — SOCIAL STABILITY: SOCIAL INSECURITY: WITHIN THE LAST YEAR, HAVE YOU BEEN HUMILIATED OR EMOTIONALLY ABUSED IN OTHER WAYS BY YOUR PARTNER OR EX-PARTNER?: NO

## 2024-09-25 SDOH — ECONOMIC STABILITY: FOOD INSECURITY: WITHIN THE PAST 12 MONTHS, THE FOOD YOU BOUGHT JUST DIDN'T LAST AND YOU DIDN'T HAVE MONEY TO GET MORE.: NEVER TRUE

## 2024-09-25 SDOH — ECONOMIC STABILITY: INCOME INSECURITY: IN THE PAST 12 MONTHS, HAS THE ELECTRIC, GAS, OIL, OR WATER COMPANY THREATENED TO SHUT OFF SERVICE IN YOUR HOME?: NO

## 2024-09-25 SDOH — SOCIAL STABILITY: SOCIAL INSECURITY
WITHIN THE LAST YEAR, HAVE TO BEEN RAPED OR FORCED TO HAVE ANY KIND OF SEXUAL ACTIVITY BY YOUR PARTNER OR EX-PARTNER?: NO

## 2024-09-25 SDOH — ECONOMIC STABILITY: TRANSPORTATION INSECURITY
IN THE PAST 12 MONTHS, HAS THE LACK OF TRANSPORTATION KEPT YOU FROM MEDICAL APPOINTMENTS OR FROM GETTING MEDICATIONS?: NO

## 2024-09-25 SDOH — ECONOMIC STABILITY: TRANSPORTATION INSECURITY
IN THE PAST 12 MONTHS, HAS LACK OF TRANSPORTATION KEPT YOU FROM MEETINGS, WORK, OR FROM GETTING THINGS NEEDED FOR DAILY LIVING?: NO

## 2024-09-25 SDOH — ECONOMIC STABILITY: INCOME INSECURITY: HOW HARD IS IT FOR YOU TO PAY FOR THE VERY BASICS LIKE FOOD, HOUSING, MEDICAL CARE, AND HEATING?: NOT HARD AT ALL

## 2024-09-25 ASSESSMENT — PAIN DESCRIPTION - ORIENTATION: ORIENTATION: LEFT

## 2024-09-25 ASSESSMENT — COGNITIVE AND FUNCTIONAL STATUS - GENERAL
CLIMB 3 TO 5 STEPS WITH RAILING: A LITTLE
DAILY ACTIVITIY SCORE: 24
WALKING IN HOSPITAL ROOM: A LITTLE
MOBILITY SCORE: 22

## 2024-09-25 ASSESSMENT — PAIN DESCRIPTION - LOCATION: LOCATION: GROIN

## 2024-09-25 ASSESSMENT — PAIN SCALES - GENERAL
PAINLEVEL_OUTOF10: 3
PAINLEVEL_OUTOF10: 0 - NO PAIN
PAINLEVEL_OUTOF10: 0 - NO PAIN

## 2024-09-25 NOTE — DISCHARGE SUMMARY
Discharge Diagnosis  AVNRT (AV maura re-entry tachycardia) (CMS-HCC)    Issues Requiring Follow-Up  Kelin Rangel CNP ( Electrophysiology) 1 month after ablation   will notify you of appointment. If you haven't heard in 1 week, please call [phone]    Hospital Course  68 yo M with PMH of HTN, NIDDM2, OSDA, and SVT, who successfully underwent typical AVNRT RFA without immediate complications. All RFV/LFV accesses were closed by Perclose x3 and Vascade x1. Due to the alte case, patient was admitted and his hospital course was uncomplicated. Patient will be discharged home with stable condition.    Pertinent Physical Exam At Time of Discharge  Physical Exam  Vitals and nursing note reviewed.   Cardiovascular:      Rate and Rhythm: Normal rate and regular rhythm.      Pulses: Normal pulses.      Heart sounds: Normal heart sounds.   Pulmonary:      Effort: Pulmonary effort is normal.      Breath sounds: Normal breath sounds.   Skin:     General: Skin is warm.   Neurological:      General: No focal deficit present.      Mental Status: He is oriented to person, place, and time. Mental status is at baseline.     Akin: NO tenderness, bleeding, oozing, or hematoma at bilateral femoral sites    Home Medications     Medication List      CHANGE how you take these medications     dilTIAZem  mg 24 hr capsule; Commonly known as: Tiazac; Take 1   capsule (240 mg) by mouth once daily.; What changed: Another medication   with the same name was removed. Continue taking this medication, and   follow the directions you see here.     CONTINUE taking these medications     albuterol 90 mcg/actuation inhaler   azelastine 137 mcg (0.1 %) nasal spray; Commonly known as: Astelin;   Administer 1 spray into each nostril 2 times a day. Use in each nostril as   directed   budesonide-formoteroL 160-4.5 mcg/actuation inhaler; Commonly known as:   Symbicort   buPROPion  mg 24 hr tablet; Commonly known as: Wellbutrin XL;  TAKE   1 TABLET (150 MG) BY MOUTH ONCE DAILY IN THE MORNING. DO NOT CRUSH, CHEW,   OR SPLIT.   esomeprazole 40 mg DR capsule; Commonly known as: NexIUM; Take 1 capsule   (40 mg) by mouth once daily in the morning. Take before meals.   glimepiride 2 mg tablet; Commonly known as: AmaryL; Take 1 tablet (2 mg)   by mouth once daily in the morning. Take before meals.   glucose 4 gram chewable tablet; Chew 4 tablets (16 g) if needed for low   blood sugar - see comments.   Jardiance 25 mg; Generic drug: empagliflozin; TAKE 1 TABLET BY MOUTH   EVERY DAY   losartan 25 mg tablet; Commonly known as: Cozaar   metFORMIN  mg 24 hr tablet; Commonly known as: Glucophage-XR; Take   1 tablet with breakfast and 2 tablets with dinner   OneTouch Ultra Test strip; Generic drug: blood sugar diagnostic; USE TO   CHECK BLOOD GLUCOSE UP TO THREE TIMES DAILY AS DIRECTED   rosuvastatin 10 mg tablet; Commonly known as: Crestor; TAKE 1 TABLET BY   MOUTH EVERY DAY   tamsulosin 0.4 mg 24 hr capsule; Commonly known as: Flomax; TAKE 2   CAPSULES (0.8 MG) BY MOUTH ONCE DAILY AT BEDTIME.   venlafaxine XR 75 mg 24 hr capsule; Commonly known as: Effexor-XR; Take   2 capsules (150 mg) by mouth once daily.     ASK your doctor about these medications     ondansetron 4 mg tablet; Commonly known as: Zofran; Take 1 tablet (4 mg)   by mouth every 8 hours if needed for nausea or vomiting.   sodium,potassium,mag sulfates 17.5-3.13-1.6 gram recon soln solution;   Commonly known as: Suprep Bowel Prep Kit; Take one bottle twice as   directed by the prep instructions       Outpatient Follow-Up  Future Appointments   Date Time Provider Department Center   12/4/2024  9:30 AM Juliane Guzman MD [id number]       Boni Francis MD

## 2024-09-25 NOTE — DISCHARGE INSTRUCTIONS
INSTRUCTIONS AFTER ABLATION PROCEDURE:    * All other medications will generally remain the same unless you are told otherwise.  Resume taking your home medications today (including blood thinner) as listed on the discharge instructions.    * In the first week post-ablation you should take it easy. No heavy lifting or heavy exercise, no treadmill. You can use the stairs if needed but go slowly and minimize the number of times up and down.    * Some minor bruising is common at each groin access site with minor soreness as if you had banged the area. Bruising may occasionally be seen to extend down the leg. This is normal as is an occasional small quarter sized bump in the area. If larger swelling or more significant pain occurs at the area, please contact the office or go the nearest Emergency Room.    * You may have some minor chest pain for the next week or so. The pain will often worsen with a deep breath and be better when leaning forward. This is pericardial chest pain from the ablation and is generally not of concern. It should resolve within a week although it might increase for a day or so after the ablation.    * If you develop unexplained fevers exceeding 100 degrees anytime within the first 3 weeks post-ablation, you need to contact the office. Low grade fevers of around 99 degrees are common in the first day or so post-ablation.    * Continue to follow up with your primary care physician, primary cardiologist, and any other specialists you normally see.    * No driving for 2 days post procedure (IF you were driving prior to procedure)    *Diet: Heart healthy    Call Provider If:  Breathing faster than normal.     Fever of 100.4 F (38 C) or higher.     Chills.     Any new concerning symptoms.     Passing out.     Patient Instructions, Next 24 hours:  DO NOT drive a car, operate machinery or power tools.  It is recommended that a responsible adult be with you for the first 24 hours.     DO NOT drink any  alcoholic drinks or take any non-prescriptive medications that contain alcohol for the first 24 hours.     DO NOT make any important decisions for the first 24 hours.    Activity:  You are advised to go directly home from the hospital.     DO NOT lift anything heavier than 10 pounds for one week, this allows for proper healing of the groin.     No excessive exercise or treadmill use for one week. You may walk and do stairs, slowly.     No sexual activities for 24 hours after you arrive home.    Wound Care:  If slight bleeding should occur at groin site, lie down and have someone apply firm pressure just above the puncture site for 5 minutes.  If it continues or is profuse, call 911. Always notify your doctor if bleeding occurs.     Keep site clean and dry. Let air dry or you may use a simple bandaid.     Gently cleanse the puncture site in your groin with soap and water only.     You may experience some tenderness, bruising or minimal inflammation.  If you have any concerns, you may contact the EP Lab or if any of these symptoms become excessive, contact your electrophysiologist or go to the emergency room.     No tub baths, soaking, hot tubs, or swimming for one week.     May shower the next day after your procedure.    Other Instructions:  If you have any questions about the effects of the sedative drugs or groin care, call the physician who performed your procedure.    FOLLOW UP:  1) Primary care physician 2 weeks--call to schedule    2) Kelin Rangel CNP ( Electrophysiology) 1 month after ablation   will notify you of appointment. If you haven't heard in 1 week, please call [phone]

## 2024-09-25 NOTE — NURSING NOTE
9/25/24 Nursing Discharge Note  AVS reviewed with patient and spouse. All questions answered. IV and tele removed. Patient left the floor with all belongings.    Zoya Post RN

## 2024-09-25 NOTE — CARE PLAN
Problem: Pain - Adult  Goal: Verbalizes/displays adequate comfort level or baseline comfort level  Outcome: Progressing     Problem: Safety - Adult  Goal: Free from fall injury  Outcome: Progressing     Problem: Discharge Planning  Goal: Discharge to home or other facility with appropriate resources  Outcome: Progressing     Problem: Chronic Conditions and Co-morbidities  Goal: Patient's chronic conditions and co-morbidity symptoms are monitored and maintained or improved  Outcome: Progressing       The clinical goals for the shift include pt will remain HDS throughout shift

## 2024-09-25 NOTE — CARE PLAN
The patient's goals for the shift include      The clinical goals for the shift include Patient will remain HDS throughout shift      Problem: Pain - Adult  Goal: Verbalizes/displays adequate comfort level or baseline comfort level  Outcome: Progressing     Problem: Safety - Adult  Goal: Free from fall injury  Outcome: Progressing     Problem: Discharge Planning  Goal: Discharge to home or other facility with appropriate resources  Outcome: Progressing     Problem: Chronic Conditions and Co-morbidities  Goal: Patient's chronic conditions and co-morbidity symptoms are monitored and maintained or improved  Outcome: Progressing

## 2024-09-26 LAB
ATRIAL RATE: 70 BPM
P AXIS: 55 DEGREES
P OFFSET: 201 MS
P ONSET: 138 MS
PR INTERVAL: 174 MS
Q ONSET: 225 MS
QRS COUNT: 11 BEATS
QRS DURATION: 106 MS
QT INTERVAL: 422 MS
QTC CALCULATION(BAZETT): 455 MS
QTC FREDERICIA: 444 MS
R AXIS: -27 DEGREES
T AXIS: 24 DEGREES
T OFFSET: 436 MS
VENTRICULAR RATE: 70 BPM

## 2024-12-04 ENCOUNTER — APPOINTMENT (OUTPATIENT)
Dept: PRIMARY CARE | Facility: CLINIC | Age: 69
End: 2024-12-04
Payer: MEDICARE

## 2024-12-04 VITALS
SYSTOLIC BLOOD PRESSURE: 133 MMHG | DIASTOLIC BLOOD PRESSURE: 70 MMHG | HEIGHT: 73 IN | WEIGHT: 241.41 LBS | BODY MASS INDEX: 31.99 KG/M2

## 2024-12-04 DIAGNOSIS — I47.19 AVNRT (AV NODAL RE-ENTRY TACHYCARDIA) (CMS-HCC): ICD-10-CM

## 2024-12-04 DIAGNOSIS — K21.9 GASTROESOPHAGEAL REFLUX DISEASE WITHOUT ESOPHAGITIS: ICD-10-CM

## 2024-12-04 DIAGNOSIS — J45.30 MILD PERSISTENT ASTHMA WITHOUT COMPLICATION (HHS-HCC): Primary | ICD-10-CM

## 2024-12-04 DIAGNOSIS — Z00.00 ROUTINE GENERAL MEDICAL EXAMINATION AT HEALTH CARE FACILITY: Primary | ICD-10-CM

## 2024-12-04 DIAGNOSIS — I10 BENIGN ESSENTIAL HYPERTENSION: ICD-10-CM

## 2024-12-04 DIAGNOSIS — E78.2 MIXED HYPERLIPIDEMIA: ICD-10-CM

## 2024-12-04 DIAGNOSIS — M79.644 BILATERAL THUMB PAIN: ICD-10-CM

## 2024-12-04 DIAGNOSIS — E11.9 TYPE 2 DIABETES MELLITUS WITHOUT COMPLICATION, WITHOUT LONG-TERM CURRENT USE OF INSULIN (MULTI): ICD-10-CM

## 2024-12-04 DIAGNOSIS — M79.645 BILATERAL THUMB PAIN: ICD-10-CM

## 2024-12-04 DIAGNOSIS — K21.9 GASTROESOPHAGEAL REFLUX DISEASE, UNSPECIFIED WHETHER ESOPHAGITIS PRESENT: ICD-10-CM

## 2024-12-04 PROBLEM — R53.83 FATIGUE: Status: RESOLVED | Noted: 2024-07-11 | Resolved: 2024-12-04

## 2024-12-04 PROBLEM — S82.891D CLOSED FRACTURE OF RIGHT ANKLE WITH ROUTINE HEALING: Status: RESOLVED | Noted: 2023-11-30 | Resolved: 2024-12-04

## 2024-12-04 PROBLEM — M79.671 PAIN IN BOTH FEET: Status: RESOLVED | Noted: 2023-03-06 | Resolved: 2024-12-04

## 2024-12-04 PROBLEM — J01.10 ACUTE FRONTAL SINUSITIS: Status: RESOLVED | Noted: 2024-01-11 | Resolved: 2024-12-04

## 2024-12-04 PROBLEM — M25.571 RIGHT ANKLE PAIN: Status: RESOLVED | Noted: 2023-03-06 | Resolved: 2024-12-04

## 2024-12-04 PROBLEM — M79.672 PAIN IN BOTH FEET: Status: RESOLVED | Noted: 2023-03-06 | Resolved: 2024-12-04

## 2024-12-04 PROBLEM — M79.673 FOOT PAIN: Status: RESOLVED | Noted: 2023-02-06 | Resolved: 2024-12-04

## 2024-12-04 PROBLEM — R00.2 HEART PALPITATIONS: Status: RESOLVED | Noted: 2024-06-05 | Resolved: 2024-12-04

## 2024-12-04 LAB
ANION GAP SERPL CALC-SCNC: 13 MMOL/L (ref 10–20)
BUN SERPL-MCNC: 18 MG/DL (ref 6–23)
CALCIUM SERPL-MCNC: 8.7 MG/DL (ref 8.6–10.6)
CHLORIDE SERPL-SCNC: 106 MMOL/L (ref 98–107)
CHOLEST SERPL-MCNC: 167 MG/DL (ref 0–199)
CHOLESTEROL/HDL RATIO: 3.7
CO2 SERPL-SCNC: 23 MMOL/L (ref 21–32)
CREAT SERPL-MCNC: 0.87 MG/DL (ref 0.5–1.3)
EGFRCR SERPLBLD CKD-EPI 2021: >90 ML/MIN/1.73M*2
EST. AVERAGE GLUCOSE BLD GHB EST-MCNC: 154 MG/DL
GLUCOSE SERPL-MCNC: 148 MG/DL (ref 74–99)
HBA1C MFR BLD: 7 %
HDLC SERPL-MCNC: 45.3 MG/DL
LDLC SERPL CALC-MCNC: 88 MG/DL
NON HDL CHOLESTEROL: 122 MG/DL (ref 0–149)
POTASSIUM SERPL-SCNC: 4.4 MMOL/L (ref 3.5–5.3)
SODIUM SERPL-SCNC: 138 MMOL/L (ref 136–145)
TRIGL SERPL-MCNC: 171 MG/DL (ref 0–149)
VLDL: 34 MG/DL (ref 0–40)

## 2024-12-04 PROCEDURE — 4010F ACE/ARB THERAPY RXD/TAKEN: CPT | Performed by: FAMILY MEDICINE

## 2024-12-04 PROCEDURE — 83036 HEMOGLOBIN GLYCOSYLATED A1C: CPT

## 2024-12-04 PROCEDURE — 3075F SYST BP GE 130 - 139MM HG: CPT | Performed by: FAMILY MEDICINE

## 2024-12-04 PROCEDURE — 1170F FXNL STATUS ASSESSED: CPT | Performed by: FAMILY MEDICINE

## 2024-12-04 PROCEDURE — 80048 BASIC METABOLIC PNL TOTAL CA: CPT

## 2024-12-04 PROCEDURE — 3051F HG A1C>EQUAL 7.0%<8.0%: CPT | Performed by: FAMILY MEDICINE

## 2024-12-04 PROCEDURE — 99397 PER PM REEVAL EST PAT 65+ YR: CPT | Performed by: FAMILY MEDICINE

## 2024-12-04 PROCEDURE — 1036F TOBACCO NON-USER: CPT | Performed by: FAMILY MEDICINE

## 2024-12-04 PROCEDURE — G0439 PPPS, SUBSEQ VISIT: HCPCS | Performed by: FAMILY MEDICINE

## 2024-12-04 PROCEDURE — 99214 OFFICE O/P EST MOD 30 MIN: CPT | Performed by: FAMILY MEDICINE

## 2024-12-04 PROCEDURE — 3008F BODY MASS INDEX DOCD: CPT | Performed by: FAMILY MEDICINE

## 2024-12-04 PROCEDURE — 3078F DIAST BP <80 MM HG: CPT | Performed by: FAMILY MEDICINE

## 2024-12-04 PROCEDURE — 1159F MED LIST DOCD IN RCRD: CPT | Performed by: FAMILY MEDICINE

## 2024-12-04 PROCEDURE — 1123F ACP DISCUSS/DSCN MKR DOCD: CPT | Performed by: FAMILY MEDICINE

## 2024-12-04 PROCEDURE — 80061 LIPID PANEL: CPT

## 2024-12-04 RX ORDER — ESOMEPRAZOLE MAGNESIUM 40 MG/1
40 CAPSULE, DELAYED RELEASE ORAL
Qty: 90 CAPSULE | Refills: 3 | Status: SHIPPED | OUTPATIENT
Start: 2024-12-04

## 2024-12-04 RX ORDER — BUDESONIDE AND FORMOTEROL FUMARATE DIHYDRATE 160; 4.5 UG/1; UG/1
2 AEROSOL RESPIRATORY (INHALATION)
Qty: 10.2 G | Refills: 3 | Status: SHIPPED | OUTPATIENT
Start: 2024-12-04

## 2024-12-04 ASSESSMENT — ACTIVITIES OF DAILY LIVING (ADL)
TAKING_MEDICATION: INDEPENDENT
DRESSING: INDEPENDENT
DOING_HOUSEWORK: INDEPENDENT
BATHING: INDEPENDENT
MANAGING_FINANCES: INDEPENDENT
GROCERY_SHOPPING: INDEPENDENT

## 2024-12-04 ASSESSMENT — PATIENT HEALTH QUESTIONNAIRE - PHQ9
SUM OF ALL RESPONSES TO PHQ9 QUESTIONS 1 AND 2: 0
2. FEELING DOWN, DEPRESSED OR HOPELESS: NOT AT ALL
1. LITTLE INTEREST OR PLEASURE IN DOING THINGS: NOT AT ALL

## 2024-12-04 ASSESSMENT — ENCOUNTER SYMPTOMS
DEPRESSION: 0
OCCASIONAL FEELINGS OF UNSTEADINESS: 1
LOSS OF SENSATION IN FEET: 1

## 2024-12-04 NOTE — PROGRESS NOTES
"Subjective   Reason for Visit: Rui Shah is an 69 y.o. male here for a Medicare Wellness visit.          Reviewed all medications by prescribing practitioner or clinical pharmacist (such as prescriptions, OTCs, herbal therapies and supplements) and documented in the medical record.    HPI  DMII: doing well, no abnormal sugars, taking medications without any issues    HTN: no problems or issues    Had ablation done in September, not sure if noticing any difference, still occasional chest pressure.  No hypotension or tachycardic episodes.    Dealing with significant bilateral thumb pain and wrist pain.    Patient Care Team:  Juliane Guzman MD as PCP - General (Family Medicine)  Juliane Guzman MD as PCP - [employer] PCP  Gini Lockhart PharmD as Pharmacist (Family Medicine)     Review of Systems  Negative unless noted in HPI    Objective   Vitals:  /70   Ht 1.854 m (6' 0.99\")   Wt 110 kg (241 lb 6.6 oz)   BMI 31.86 kg/m²       Physical Exam  Constitutional:       Appearance: Normal appearance.   HENT:      Right Ear: Tympanic membrane, ear canal and external ear normal.      Left Ear: Tympanic membrane, ear canal and external ear normal.      Nose: Nose normal.      Mouth/Throat:      Mouth: Mucous membranes are moist.   Eyes:      Conjunctiva/sclera: Conjunctivae normal.   Cardiovascular:      Rate and Rhythm: Normal rate and regular rhythm.   Pulmonary:      Effort: Pulmonary effort is normal.      Breath sounds: Normal breath sounds.   Abdominal:      General: Abdomen is flat.      Palpations: Abdomen is soft.   Musculoskeletal:         General: Normal range of motion.      Cervical back: Neck supple.   Neurological:      General: No focal deficit present.      Mental Status: He is alert.   Psychiatric:         Mood and Affect: Mood normal.         Assessment & Plan  Routine general medical examination at health care facility  Recommended screening guidelines addressed and orders placed " as indicated by age and chronic conditions  Screening labs ordered, will call with results  Immunizations up to date  Colonoscopy up to date  Continue to work on healthy lifestyle including well balanced diet, regular activity, limit alcohol, no tobacco products  Follow up annually           Benign essential hypertension  Well controlled  Continue current regimen   Check labs    Orders:    Basic Metabolic Panel    Hemoglobin A1C    Type 2 diabetes mellitus without complication, without long-term current use of insulin (Multi)  Due for A1C check  Recommend optho follow up  Follow up in 3 months  Up to date with podiatry  Orders:    Basic Metabolic Panel    Hemoglobin A1C    Mixed hyperlipidemia    Orders:    Lipid Panel    Bilateral thumb pain  Discussed preventative measures  Follow up with orthopedics, referral provided    Orders:    Referral to Orthopaedic Surgery; Future    AVNRT (AV maura re-entry tachycardia) (CMS-HCC)  Status post ablation  Doing well  Encourage pt to follow up with electrophysiology         Gastroesophageal reflux disease without esophagitis  Stable, continue current regimen  EGD done 2/24

## 2024-12-04 NOTE — ASSESSMENT & PLAN NOTE
Well controlled  Continue current regimen   Check labs    Orders:    Basic Metabolic Panel    Hemoglobin A1C

## 2024-12-04 NOTE — ASSESSMENT & PLAN NOTE
Recommended screening guidelines addressed and orders placed as indicated by age and chronic conditions  Screening labs ordered, will call with results  Immunizations up to date  Colonoscopy up to date  Continue to work on healthy lifestyle including well balanced diet, regular activity, limit alcohol, no tobacco products  Follow up annually            Dermal Autograft Text: The defect edges were debeveled with a #15 scalpel blade.  Given the location of the defect, shape of the defect and the proximity to free margins a dermal autograft was deemed most appropriate.  Using a sterile surgical marker, the primary defect shape was transferred to the donor site. The area thus outlined was incised deep to adipose tissue with a #15 scalpel blade.  The harvested graft was then trimmed of adipose and epidermal tissue until only dermis was left.  The skin graft was then placed in the primary defect and oriented appropriately.

## 2024-12-04 NOTE — ASSESSMENT & PLAN NOTE
Due for A1C check  Recommend optho follow up  Follow up in 3 months  Up to date with podiatry  Orders:    Basic Metabolic Panel    Hemoglobin A1C

## 2024-12-04 NOTE — ASSESSMENT & PLAN NOTE
Discussed preventative measures  Follow up with orthopedics, referral provided    Orders:    Referral to Orthopaedic Surgery; Future

## 2024-12-15 DIAGNOSIS — E11.40 TYPE 2 DIABETES MELLITUS WITH DIABETIC NEUROPATHY, WITHOUT LONG-TERM CURRENT USE OF INSULIN: ICD-10-CM

## 2024-12-17 RX ORDER — EMPAGLIFLOZIN 25 MG/1
25 TABLET, FILM COATED ORAL DAILY
Qty: 90 TABLET | Refills: 0 | Status: SHIPPED | OUTPATIENT
Start: 2024-12-17 | End: 2024-12-18

## 2024-12-18 DIAGNOSIS — E11.40 TYPE 2 DIABETES MELLITUS WITH DIABETIC NEUROPATHY, WITHOUT LONG-TERM CURRENT USE OF INSULIN: ICD-10-CM

## 2024-12-18 RX ORDER — EMPAGLIFLOZIN 25 MG/1
25 TABLET, FILM COATED ORAL DAILY
Qty: 90 TABLET | Refills: 0 | Status: SHIPPED | OUTPATIENT
Start: 2024-12-18

## 2024-12-20 NOTE — PROGRESS NOTES
[facility]  Hand and Upper Extremity Service  Initial evaluation / Consultation      Consult requested by Referring Physician: Dr. Guzman     Chief Complaint: Bilateral thumbs        Rui Shah is a 69 y.o retired , right hand dominant with diabetes referred by his primary care physician for longstanding bilateral thumb and wrist pain. left side is more bothersome than the right. He does not recall any prior injury. Symptoms have been present for several years, but seemed to have gotten much worse over the last year. He manages his symptoms with activity modifications, uh anti-inflammatory medications and THC gummies.Otherwise, he is not had any formal workup or treatment for his symptoms.        Please refer to New Patient Intake Form scanned into patient's electronic record for self reported past medical history, past surgical history, medications, allergies, family history, social history and 10 point review of systems    Examination:  Constitutional: Oriented to person, place, and time.  Appears well-developed and well-nourished.  Head: Normocephalic and atraumatic.  Eyes: Pupils are equal, round, and reactive to light.  Cardiovascular: Intact distal pulses.  Pulmonary/Chest/Breast: Effort normal. No respiratory distress.  Neurological: Alert and oriented to person, place, and time.  Skin: Skin is warm and dry.  Psychiatric: normal mood and affect.  Behavior is normal.  Musculoskeletal: Examination of his bilateral hands reveal diffused arthritic changes primarily involving the fingers at the PIP joints. He has, some swelling dorsally over the right wrist and at the base of his bilateral thumbs. He has positive CMC grind tests bilaterally with crepitus, no MP joint hyperextension stability, mild tenderness dorsally over his wrist joints, worse on the right than on the left.        Personal Interpretation of Diagnostic studies: X-rays of his bilateral hands,  obtained today demonstrate diffuse arthritic changes. He has advanced arthritis at the thumb CMC joints bilaterally. On the left side, he has some narrowing at his capitate and lunate articulation. On the right side he has findings consistent with stage 3 SLAC arthritis.      Impression:  Bilateral thumb and wrist arthritis      Plan: It appears that most of his symptoms are coming from his thumb joints. We have provided him comfort cool splints today, which he found very comfortable and he can wear these as needed. And we've also given a bilateral thumb CMC joints injections. He'll monitor his blood sugars after this injection, but his hemoglobin A1C was stable at 7% earlier this year. Return to see me as  needed for recurrence or worsening of his symptoms for consideration of possible repeat injections in the future.        In Office Procedures Performed: bilateral thumb CMC joints injections.  S Inj/Asp: bilateral thumb CMC on 12/24/2024 12:02 PM  Indications: pain  Details: 25 G needle, dorsal approach  Medications (Right): 20 mg triamcinolone acetonide 40 mg/mL; 0.5 mL lidocaine 10 mg/mL (1 %)  Medications (Left): 20 mg triamcinolone acetonide 40 mg/mL; 0.5 mL lidocaine 10 mg/mL (1 %)  Outcome: tolerated well, no immediate complications  Procedure, treatment alternatives, risks and benefits explained, specific risks discussed. Consent was given by the patient. Immediately prior to procedure a time out was called to verify the correct patient, procedure, equipment, support staff and site/side marked as required. Patient was prepped and draped in the usual sterile fashion.         Patient was prescribed a Comfort Cool for CMC arthritis. The patient has weakness, instability and/or deformity of their bilateral thumbs which requires stabilization from this orthosis to improve their function.      Verbal and written instructions for the use, wear schedule, cleaning and application of this item were given.  Patient  was instructed that should the brace result in increased pain, decreased sensation, increased swelling, or an overall worsening of their medical condition, to please contact our office immediately.     Orthotic management and training was provided for skin care, modifications due to healing tissues, edema changes, interruption in skin integrity, and safety precautions with the orthosis.        Follow up: PRN basis           Aaron Salinas MD  [facility]  Department of Orthopaedic Surgery  Hand and Upper Extremity Reconstruction      Scribe Attestation  By signing my name below, IYobany Scribe   attest that this documentation has been prepared under the direction and in the presence of Dr. Aaron Salinas.    Scribe Attestation  By signing my name below, IJewell Scribe   attest that this documentation has been prepared under the direction and in the presence of Aaron Salinas MD.    Dictation performed with the use of voice recognition software.  Syntax and grammatical errors may exist.

## 2024-12-24 ENCOUNTER — HOSPITAL ENCOUNTER (OUTPATIENT)
Dept: RADIOLOGY | Facility: HOSPITAL | Age: 69
Discharge: HOME | End: 2024-12-24
Payer: MEDICARE

## 2024-12-24 ENCOUNTER — APPOINTMENT (OUTPATIENT)
Dept: ORTHOPEDIC SURGERY | Facility: HOSPITAL | Age: 69
End: 2024-12-24
Payer: MEDICARE

## 2024-12-24 VITALS — BODY MASS INDEX: 32.64 KG/M2 | HEIGHT: 72 IN | WEIGHT: 241 LBS

## 2024-12-24 DIAGNOSIS — M79.641 BILATERAL HAND PAIN: ICD-10-CM

## 2024-12-24 DIAGNOSIS — M79.641 BILATERAL HAND PAIN: Primary | ICD-10-CM

## 2024-12-24 DIAGNOSIS — M79.642 BILATERAL HAND PAIN: Primary | ICD-10-CM

## 2024-12-24 DIAGNOSIS — M79.645 BILATERAL THUMB PAIN: ICD-10-CM

## 2024-12-24 DIAGNOSIS — M79.644 BILATERAL THUMB PAIN: ICD-10-CM

## 2024-12-24 DIAGNOSIS — M19.049 CMC ARTHRITIS: ICD-10-CM

## 2024-12-24 DIAGNOSIS — M79.642 BILATERAL HAND PAIN: ICD-10-CM

## 2024-12-24 PROCEDURE — L3924 HFO WITHOUT JOINTS PRE OTS: HCPCS | Performed by: ORTHOPAEDIC SURGERY

## 2024-12-24 PROCEDURE — 73130 X-RAY EXAM OF HAND: CPT | Mod: 50

## 2024-12-24 PROCEDURE — 99214 OFFICE O/P EST MOD 30 MIN: CPT | Performed by: ORTHOPAEDIC SURGERY

## 2024-12-24 PROCEDURE — 2500000004 HC RX 250 GENERAL PHARMACY W/ HCPCS (ALT 636 FOR OP/ED): Performed by: ORTHOPAEDIC SURGERY

## 2024-12-24 PROCEDURE — 20600 DRAIN/INJ JOINT/BURSA W/O US: CPT | Mod: 50 | Performed by: ORTHOPAEDIC SURGERY

## 2024-12-24 RX ORDER — LIDOCAINE HYDROCHLORIDE 10 MG/ML
0.5 INJECTION, SOLUTION INFILTRATION; PERINEURAL
Status: COMPLETED | OUTPATIENT
Start: 2024-12-24 | End: 2024-12-24

## 2024-12-24 RX ORDER — TRIAMCINOLONE ACETONIDE 40 MG/ML
20 INJECTION, SUSPENSION INTRA-ARTICULAR; INTRAMUSCULAR
Status: COMPLETED | OUTPATIENT
Start: 2024-12-24 | End: 2024-12-24

## 2024-12-24 ASSESSMENT — PAIN SCALES - GENERAL: PAINLEVEL_OUTOF10: 5 - MODERATE PAIN

## 2024-12-24 ASSESSMENT — PAIN - FUNCTIONAL ASSESSMENT: PAIN_FUNCTIONAL_ASSESSMENT: 0-10

## 2024-12-24 ASSESSMENT — PAIN DESCRIPTION - DESCRIPTORS: DESCRIPTORS: ACHING;SORE

## 2024-12-24 NOTE — LETTER
December 24, 2024     Juliane Guzmna MD  [address] [facility]  [address]  [facility] [address]    Patient: Rui Shah   YOB: 1955   Date of Visit: 12/24/2024       Dear Dr. Juliane Guzman MD:    Thank you for referring Rui Shah to me for evaluation. Below are my notes for this consultation.  If you have questions, please do not hesitate to call me. I look forward to following your patient along with you.       Sincerely,     Aaron Salinas MD      CC: No Recipients  ______________________________________________________________________________________    [facility]  Hand and Upper Extremity Service  Initial evaluation / Consultation      Consult requested by Referring Physician: Dr. Guzman     Chief Complaint: Bilateral thumbs        Rui Shah is a 69 y.o retired , right hand dominant with diabetes referred by his primary care physician for longstanding bilateral thumb and wrist pain. left side is more bothersome than the right. He does not recall any prior injury. Symptoms have been present for several years, but seemed to have gotten much worse over the last year. He manages his symptoms with activity modifications, uh anti-inflammatory medications and THC gummies.Otherwise, he is not had any formal workup or treatment for his symptoms.        Please refer to New Patient Intake Form scanned into patient's electronic record for self reported past medical history, past surgical history, medications, allergies, family history, social history and 10 point review of systems    Examination:  Constitutional: Oriented to person, place, and time.  Appears well-developed and well-nourished.  Head: Normocephalic and atraumatic.  Eyes: Pupils are equal, round, and reactive to light.  Cardiovascular: Intact distal pulses.  Pulmonary/Chest/Breast: Effort normal. No respiratory distress.  Neurological: Alert and oriented to person, place, and time.  Skin: Skin is warm and  dry.  Psychiatric: normal mood and affect.  Behavior is normal.  Musculoskeletal: Examination of his bilateral hands reveal diffused arthritic changes primarily involving the fingers at the PIP joints. He has, some swelling dorsally over the right wrist and at the base of his bilateral thumbs. He has positive CMC grind tests bilaterally with crepitus, no MP joint hyperextension stability, mild tenderness dorsally over his wrist joints, worse on the right than on the left.        Personal Interpretation of Diagnostic studies: X-rays of his bilateral hands, obtained today demonstrate diffuse arthritic changes. He has advanced arthritis at the thumb CMC joints bilaterally. On the left side, he has some narrowing at his capitate and lunate articulation. On the right side he has findings consistent with stage 3 SLAC arthritis.      Impression:  Bilateral thumb and wrist arthritis      Plan: It appears that most of his symptoms are coming from his thumb joints. We have provided him comfort cool splints today, which he found very comfortable and he can wear these as needed. And we've also given a bilateral thumb CMC joints injections. He'll monitor his blood sugars after this injection, but his hemoglobin A1C was stable at 7% earlier this year. Return to see me as  needed for recurrence or worsening of his symptoms for consideration of possible repeat injections in the future.        In Office Procedures Performed: bilateral thumb CMC joints injections.  S Inj/Asp: bilateral thumb CMC on 12/24/2024 12:02 PM  Indications: pain  Details: 25 G needle, dorsal approach  Medications (Right): 20 mg triamcinolone acetonide 40 mg/mL; 0.5 mL lidocaine 10 mg/mL (1 %)  Medications (Left): 20 mg triamcinolone acetonide 40 mg/mL; 0.5 mL lidocaine 10 mg/mL (1 %)  Outcome: tolerated well, no immediate complications  Procedure, treatment alternatives, risks and benefits explained, specific risks discussed. Consent was given by the patient.  Immediately prior to procedure a time out was called to verify the correct patient, procedure, equipment, support staff and site/side marked as required. Patient was prepped and draped in the usual sterile fashion.         Patient was prescribed a Comfort Cool for CMC arthritis. The patient has weakness, instability and/or deformity of their bilateral thumbs which requires stabilization from this orthosis to improve their function.      Verbal and written instructions for the use, wear schedule, cleaning and application of this item were given.  Patient was instructed that should the brace result in increased pain, decreased sensation, increased swelling, or an overall worsening of their medical condition, to please contact our office immediately.     Orthotic management and training was provided for skin care, modifications due to healing tissues, edema changes, interruption in skin integrity, and safety precautions with the orthosis.        Follow up: PRN basis           Aaron Salinas MD  [facility]  Department of Orthopaedic Surgery  Hand and Upper Extremity Reconstruction      Scribe Attestation  By signing my name below, IYobany Scribe   attest that this documentation has been prepared under the direction and in the presence of Dr. Aaron Salinas.    Scribe Attestation  By signing my name below, IJewell Scribe   attest that this documentation has been prepared under the direction and in the presence of Araon Salinas MD.    Dictation performed with the use of voice recognition software.  Syntax and grammatical errors may exist.

## 2025-01-03 DIAGNOSIS — N40.0 BENIGN PROSTATIC HYPERPLASIA, UNSPECIFIED WHETHER LOWER URINARY TRACT SYMPTOMS PRESENT: ICD-10-CM

## 2025-01-03 RX ORDER — TAMSULOSIN HYDROCHLORIDE 0.4 MG/1
0.8 CAPSULE ORAL NIGHTLY
Qty: 180 CAPSULE | Refills: 1 | Status: SHIPPED | OUTPATIENT
Start: 2025-01-03

## 2025-01-23 DIAGNOSIS — F32.0 DEPRESSION, MAJOR, SINGLE EPISODE, MILD (CMS-HCC): ICD-10-CM

## 2025-01-23 DIAGNOSIS — I47.19 NARROW COMPLEX TACHYCARDIA (CMS-HCC): ICD-10-CM

## 2025-01-23 RX ORDER — DILTIAZEM HYDROCHLORIDE 240 MG/1
240 CAPSULE, EXTENDED RELEASE ORAL DAILY
Qty: 90 CAPSULE | Refills: 1 | Status: SHIPPED | OUTPATIENT
Start: 2025-01-23

## 2025-01-23 RX ORDER — BUPROPION HYDROCHLORIDE 150 MG/1
150 TABLET ORAL EVERY MORNING
Qty: 90 TABLET | Refills: 4 | Status: SHIPPED | OUTPATIENT
Start: 2025-01-23 | End: 2026-01-23

## 2025-02-16 DIAGNOSIS — E11.9 TYPE 2 DIABETES MELLITUS WITHOUT COMPLICATION, WITHOUT LONG-TERM CURRENT USE OF INSULIN (MULTI): ICD-10-CM

## 2025-02-17 RX ORDER — METFORMIN HYDROCHLORIDE 500 MG/1
TABLET, EXTENDED RELEASE ORAL
Qty: 360 TABLET | Refills: 1 | Status: SHIPPED | OUTPATIENT
Start: 2025-02-17

## 2025-02-17 RX ORDER — GLIMEPIRIDE 2 MG/1
2 TABLET ORAL
Qty: 90 TABLET | Refills: 3 | Status: SHIPPED | OUTPATIENT
Start: 2025-02-17 | End: 2026-02-17

## 2025-02-18 DIAGNOSIS — F32.0 DEPRESSION, MAJOR, SINGLE EPISODE, MILD (CMS-HCC): ICD-10-CM

## 2025-02-18 RX ORDER — VENLAFAXINE HYDROCHLORIDE 75 MG/1
150 CAPSULE, EXTENDED RELEASE ORAL DAILY
Qty: 180 CAPSULE | Refills: 1 | Status: SHIPPED | OUTPATIENT
Start: 2025-02-18

## 2025-03-16 DIAGNOSIS — E11.40 TYPE 2 DIABETES MELLITUS WITH DIABETIC NEUROPATHY, WITHOUT LONG-TERM CURRENT USE OF INSULIN: ICD-10-CM

## 2025-03-17 RX ORDER — EMPAGLIFLOZIN 25 MG/1
25 TABLET, FILM COATED ORAL DAILY
Qty: 90 TABLET | Refills: 0 | Status: SHIPPED | OUTPATIENT
Start: 2025-03-17

## 2025-04-26 DIAGNOSIS — J45.30 MILD PERSISTENT ASTHMA WITHOUT COMPLICATION (HHS-HCC): ICD-10-CM

## 2025-04-28 RX ORDER — BUDESONIDE AND FORMOTEROL FUMARATE DIHYDRATE 160; 4.5 UG/1; UG/1
2 AEROSOL RESPIRATORY (INHALATION) 2 TIMES DAILY
Qty: 10.2 G | Refills: 11 | Status: SHIPPED | OUTPATIENT
Start: 2025-04-28

## 2025-06-13 DIAGNOSIS — E11.40 TYPE 2 DIABETES MELLITUS WITH DIABETIC NEUROPATHY, WITHOUT LONG-TERM CURRENT USE OF INSULIN: ICD-10-CM

## 2025-06-13 RX ORDER — EMPAGLIFLOZIN 25 MG/1
25 TABLET, FILM COATED ORAL DAILY
Qty: 90 TABLET | Refills: 0 | Status: SHIPPED | OUTPATIENT
Start: 2025-06-13

## 2025-06-25 DIAGNOSIS — N40.0 BENIGN PROSTATIC HYPERPLASIA, UNSPECIFIED WHETHER LOWER URINARY TRACT SYMPTOMS PRESENT: ICD-10-CM

## 2025-06-25 RX ORDER — TAMSULOSIN HYDROCHLORIDE 0.4 MG/1
0.8 CAPSULE ORAL NIGHTLY
Qty: 180 CAPSULE | Refills: 1 | Status: SHIPPED | OUTPATIENT
Start: 2025-06-25

## 2025-06-26 ENCOUNTER — APPOINTMENT (OUTPATIENT)
Dept: PRIMARY CARE | Facility: CLINIC | Age: 70
End: 2025-06-26
Payer: MEDICARE

## 2025-06-26 VITALS
HEART RATE: 70 BPM | DIASTOLIC BLOOD PRESSURE: 90 MMHG | HEIGHT: 72 IN | BODY MASS INDEX: 30.4 KG/M2 | OXYGEN SATURATION: 94 % | SYSTOLIC BLOOD PRESSURE: 120 MMHG | TEMPERATURE: 97.9 F | WEIGHT: 224.4 LBS

## 2025-06-26 DIAGNOSIS — R07.89 ATYPICAL CHEST PAIN: Primary | ICD-10-CM

## 2025-06-26 DIAGNOSIS — J44.9 CHRONIC OBSTRUCTIVE PULMONARY DISEASE, UNSPECIFIED COPD TYPE (MULTI): ICD-10-CM

## 2025-06-26 DIAGNOSIS — E11.9 TYPE 2 DIABETES MELLITUS WITHOUT COMPLICATION, WITHOUT LONG-TERM CURRENT USE OF INSULIN: ICD-10-CM

## 2025-06-26 DIAGNOSIS — E11.40 TYPE 2 DIABETES MELLITUS WITH DIABETIC NEUROPATHY, WITHOUT LONG-TERM CURRENT USE OF INSULIN: ICD-10-CM

## 2025-06-26 DIAGNOSIS — D56.1 BETA THALASSEMIA (MULTI): ICD-10-CM

## 2025-06-26 DIAGNOSIS — I49.9 IRREGULAR HEART BEAT: ICD-10-CM

## 2025-06-26 PROBLEM — N30.01 ACUTE CYSTITIS WITH HEMATURIA: Status: RESOLVED | Noted: 2024-02-13 | Resolved: 2025-06-26

## 2025-06-26 PROBLEM — M25.673 ANKLE STIFFNESS: Status: RESOLVED | Noted: 2023-02-06 | Resolved: 2025-06-26

## 2025-06-26 PROBLEM — M25.579 ANKLE PAIN: Status: RESOLVED | Noted: 2023-02-06 | Resolved: 2025-06-26

## 2025-06-26 LAB — POC HEMOGLOBIN A1C: 8.4 % (ref 4.2–6.5)

## 2025-06-26 PROCEDURE — 1123F ACP DISCUSS/DSCN MKR DOCD: CPT | Performed by: FAMILY MEDICINE

## 2025-06-26 PROCEDURE — 83036 HEMOGLOBIN GLYCOSYLATED A1C: CPT | Performed by: FAMILY MEDICINE

## 2025-06-26 PROCEDURE — 93000 ELECTROCARDIOGRAM COMPLETE: CPT | Performed by: FAMILY MEDICINE

## 2025-06-26 PROCEDURE — 3074F SYST BP LT 130 MM HG: CPT | Performed by: FAMILY MEDICINE

## 2025-06-26 PROCEDURE — 4010F ACE/ARB THERAPY RXD/TAKEN: CPT | Performed by: FAMILY MEDICINE

## 2025-06-26 PROCEDURE — 3052F HG A1C>EQUAL 8.0%<EQUAL 9.0%: CPT | Performed by: FAMILY MEDICINE

## 2025-06-26 PROCEDURE — 1036F TOBACCO NON-USER: CPT | Performed by: FAMILY MEDICINE

## 2025-06-26 PROCEDURE — 99214 OFFICE O/P EST MOD 30 MIN: CPT | Performed by: FAMILY MEDICINE

## 2025-06-26 PROCEDURE — 1159F MED LIST DOCD IN RCRD: CPT | Performed by: FAMILY MEDICINE

## 2025-06-26 PROCEDURE — 3008F BODY MASS INDEX DOCD: CPT | Performed by: FAMILY MEDICINE

## 2025-06-26 PROCEDURE — 3080F DIAST BP >= 90 MM HG: CPT | Performed by: FAMILY MEDICINE

## 2025-06-26 ASSESSMENT — PATIENT HEALTH QUESTIONNAIRE - PHQ9
1. LITTLE INTEREST OR PLEASURE IN DOING THINGS: NOT AT ALL
2. FEELING DOWN, DEPRESSED OR HOPELESS: NOT AT ALL
SUM OF ALL RESPONSES TO PHQ9 QUESTIONS 1 AND 2: 0

## 2025-06-26 ASSESSMENT — ENCOUNTER SYMPTOMS
OCCASIONAL FEELINGS OF UNSTEADINESS: 0
DEPRESSION: 0
LOSS OF SENSATION IN FEET: 0

## 2025-06-26 NOTE — ASSESSMENT & PLAN NOTE
EKG in office with sinus with PVCs and left axis which has been noted previously  Concern for recurrent arrythmia  No ischemic changes noted on EKG and nonischemic presentation with intermittent discomfort, nonexertion related  Pt with high risk secondary to multiple comorbidities  Check labs today  Extensive discussion regarding importance of ER evaluation if acute pain and abnormal rhythm noted on smartwatch  Will have pt also follow up with EP provider to reevaluate   Again, discussed at length ER recommendations if pain was recurrent, persistent, associated with new symptoms and he did voice understanding

## 2025-06-26 NOTE — PROGRESS NOTES
"Subjective   Patient ID: Rui Shah is a 69 y.o. male who presents for Chest Pain (\"Having pain around heart\").    HPI   Presents with one month of period chest discomfort.  Reports intermittent sharp pains around the left chest.  Happen at rest, no associated with Sob, diaphoresis or nausea.  No change in activity or exacerbation.  No radiation to left arm/jaw.  Last time almost 5 days ago.   No cough, SOB, or rash.  No GI symptoms.    Has history of tachycardic arrhythmia that required an ablation about 10 months ago.  Was doing ok afterwards but has not followed up with cardiology.  Does state that during the last month his watch has alerted him several times to elevated/atypical heart rate.    DMII: taking medications, trying not to eat as much, previous A1C was in 7s.  Review of Systems  Negative unless noted in HPI    Objective   /90 (BP Location: Right arm, Patient Position: Sitting, BP Cuff Size: Adult)   Pulse 70   Temp 36.6 °C (97.9 °F) (Temporal)   Ht 1.829 m (6')   Wt 102 kg (224 lb 6.4 oz)   SpO2 94%   BMI 30.43 kg/m²     Physical Exam  Constitutional:       Appearance: Normal appearance.   Eyes:      Conjunctiva/sclera: Conjunctivae normal.   Cardiovascular:      Rate and Rhythm: Normal rate. Rhythm irregular.   Pulmonary:      Effort: Pulmonary effort is normal.      Breath sounds: Normal breath sounds.   Neurological:      Mental Status: He is alert. Mental status is at baseline.   Psychiatric:         Mood and Affect: Mood normal.         Assessment/Plan   Problem List Items Addressed This Visit           ICD-10-CM    Type 2 diabetes mellitus without complication, without long-term current use of insulin E11.9    Relevant Orders    POCT glycosylated hemoglobin (Hb A1C) manually resulted (Completed)    Albumin-Creatinine Ratio, Urine Random    Atypical chest pain - Primary R07.89    EKG in office with sinus with PVCs and left axis which has been noted previously  Concern for recurrent " arrythmia  No ischemic changes noted on EKG and nonischemic presentation with intermittent discomfort, nonexertion related  Pt with high risk secondary to multiple comorbidities  Check labs today  Extensive discussion regarding importance of ER evaluation if acute pain and abnormal rhythm noted on smartwatch  Will have pt also follow up with EP provider to reevaluate   Again, discussed at length ER recommendations if pain was recurrent, persistent, associated with new symptoms and he did voice understanding           Relevant Orders    ECG 12 Lead (Completed)    Irregular heart beat I49.9    Relevant Orders    CBC    Comprehensive Metabolic Panel    TSH with reflex to Free T4 if abnormal

## 2025-06-27 LAB
ALBUMIN SERPL-MCNC: 4.5 G/DL (ref 3.6–5.1)
ALBUMIN/CREAT UR: 124 MG/G CREAT
ALP SERPL-CCNC: 91 U/L (ref 35–144)
ALT SERPL-CCNC: 21 U/L (ref 9–46)
ANION GAP SERPL CALCULATED.4IONS-SCNC: 12 MMOL/L (CALC) (ref 7–17)
AST SERPL-CCNC: 19 U/L (ref 10–35)
BILIRUB SERPL-MCNC: 0.3 MG/DL (ref 0.2–1.2)
BUN SERPL-MCNC: 27 MG/DL (ref 7–25)
CALCIUM SERPL-MCNC: 9.2 MG/DL (ref 8.6–10.3)
CHLORIDE SERPL-SCNC: 104 MMOL/L (ref 98–110)
CO2 SERPL-SCNC: 22 MMOL/L (ref 20–32)
CREAT SERPL-MCNC: 1.06 MG/DL (ref 0.7–1.35)
CREAT UR-MCNC: 54 MG/DL (ref 20–320)
EGFRCR SERPLBLD CKD-EPI 2021: 76 ML/MIN/1.73M2
ERYTHROCYTE [DISTWIDTH] IN BLOOD BY AUTOMATED COUNT: 16.2 % (ref 11–15)
GLUCOSE SERPL-MCNC: 198 MG/DL (ref 65–99)
HCT VFR BLD AUTO: 47.8 % (ref 38.5–50)
HGB BLD-MCNC: 15.3 G/DL (ref 13.2–17.1)
MCH RBC QN AUTO: 27.3 PG (ref 27–33)
MCHC RBC AUTO-ENTMCNC: 32 G/DL (ref 32–36)
MCV RBC AUTO: 85.2 FL (ref 80–100)
MICROALBUMIN UR-MCNC: 6.7 MG/DL
PLATELET # BLD AUTO: 230 THOUSAND/UL (ref 140–400)
PMV BLD REES-ECKER: 9.7 FL (ref 7.5–12.5)
POTASSIUM SERPL-SCNC: 4.6 MMOL/L (ref 3.5–5.3)
PROT SERPL-MCNC: 7.2 G/DL (ref 6.1–8.1)
RBC # BLD AUTO: 5.61 MILLION/UL (ref 4.2–5.8)
SODIUM SERPL-SCNC: 138 MMOL/L (ref 135–146)
TSH SERPL-ACNC: 1.28 MIU/L (ref 0.4–4.5)
WBC # BLD AUTO: 5.4 THOUSAND/UL (ref 3.8–10.8)

## 2025-07-10 DIAGNOSIS — I47.19 NARROW COMPLEX TACHYCARDIA: ICD-10-CM

## 2025-07-10 RX ORDER — DILTIAZEM HYDROCHLORIDE 240 MG/1
240 CAPSULE, EXTENDED RELEASE ORAL DAILY
Qty: 90 CAPSULE | Refills: 1 | Status: SHIPPED | OUTPATIENT
Start: 2025-07-10

## 2025-07-16 NOTE — PROGRESS NOTES
Cardiac Electrophysiology Office Visit   I had the pleasure seeing Rui Shah    Current Outpatient Medications   Medication Instructions    albuterol 90 mcg/actuation inhaler 2 puffs, Every 4 hours PRN    azelastine (Astelin) 137 mcg (0.1 %) nasal spray 1 spray, Each Nostril, 2 times daily, Use in each nostril as directed    blood sugar diagnostic (OneTouch Ultra Test) strip USE TO CHECK BLOOD GLUCOSE UP TO THREE TIMES DAILY AS DIRECTED    budesonide-formoterol (Symbicort) 160-4.5 mcg/actuation inhaler 2 puffs, 2 times daily    buPROPion XL (WELLBUTRIN XL) 150 mg, oral, Every morning, Do not crush, chew, or split.    esomeprazole (NEXIUM) 40 mg, oral, Daily before breakfast    glimepiride (AMARYL) 2 mg, oral, Daily before breakfast    glucose 16 g, oral, As needed    Jardiance 25 mg, oral, Daily    losartan (COZAAR) 25 mg, Daily    metFORMIN  mg 24 hr tablet TAKE 1 TABLET WITH BREAKFAST AND TAKE 2 TABLETS WITH DINNER    ondansetron (ZOFRAN) 4 mg, oral, Every 8 hours PRN    rosuvastatin (CRESTOR) 10 mg, oral, Daily    sodium,potassium,mag sulfates (Suprep Bowel Prep Kit) 17.5-3.13-1.6 gram recon soln solution Take one bottle twice as directed by the prep instructions    tamsulosin (FLOMAX) 0.8 mg, oral, Nightly    Tiadylt  mg, oral, Daily    venlafaxine XR (EFFEXOR-XR) 150 mg, oral, Daily      Subjective    Rui Shah is a 69 y.o. male .        Chief Complaint   Patient presents with    Narrow Complex Tachycardia     SVT    Ventricular Tachycardia    AVNRT     OUTPATIENT CONSULTATION: Cardiac Electrophysiology  DOS: July 17, 2025    REASON: SVT/VT - FUV   REFERRING: Husam Quigley MD     ERNESTO Shah has a past medical history of HTN, DMT2, GERD, Asthma, MARIELA  CARDIAC HISTORY:  Palpitations  Narrow Complec Tachycardia/SVT  NSVT - June 2024:  (6/5) Pt [facility] with tachycardia.  EKG: Narrow Complex Short RP Tachycardia. 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 He converted with 6 mg  IV Adenosine.     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 He was supposed to follow up with [facility] 1 mo post where it looks like he was lost to follow up.     RELEVANT TESTING:     Transthoracic echo (TTE) complete 06/14/2024   CONCLUSIONS:   1. Left ventricular systolic function is normal with a 50-55% estimated ejection fraction.   2. Moderately increased left ventricular septal thickness.   3. Spectral Doppler shows an impaired relaxation pattern of left ventricular diastolic filling.   4. Mild to moderate aortic valve regurgitation.    Event Monitor [employer] 06/05/2024-06/19/2024  * The predominant rhythm was Sinus.   * The Maximum Heart Rate recorded was 166 bpm, 06/ 18 09: 51: 16, the Minimum Heart Rate recorded was 48 bpm, 06/ 11 03: 42: 46, and the Average Heart Rate was 80 bpm.   * There were 1, 267 VE beats with a burden of < 1 % . There were 5 occurrences of Ventricular Tachycardia with the Fastest episode 162 bpm, 06/ 14 22: 20: 48, and the Longest episode 15. 5s, 06/ 14 22: 20: 48.   * There were 10, 896 SVE beats with a burden of < 1 % . There were 25 occurrences of Supraventricular Tachycardia with the Fastest episode 166 bpm, 06/ 18 09: 51: 16, and the Longest episode 41m 15. 7s, 06/ 15 14: 52: 51. * Other rhythms in this study include: Ventricular Run.   * There were 4 Patient Triggers    Lab Review:   Lab Results   Component Value Date    WBC 5.4 06/26/2025    HGB 15.3 06/26/2025    HCT 47.8 06/26/2025     06/26/2025    CHOL 167 12/04/2024    TRIG 171 (H) 12/04/2024    HDL 45.3 12/04/2024    ALT 21 06/26/2025    AST 19 06/26/2025     06/26/2025    K 4.6 06/26/2025     06/26/2025    CREATININE 1.06 06/26/2025    BUN 27 (H) 06/26/2025    CO2 22 06/26/2025    TSH 1.28 06/26/2025    INR 1.0 11/27/2018    HGBA1C 8.4 (A) 06/26/2025       Review of Systems   All other systems reviewed and are negative.       Objective    Cardiovascular:      PMI at left  midclavicular line. Normal rate. Regular rhythm. Normal S1. Normal S2.       Murmurs: There is no murmur.      No gallop.  No click. No rub.   Pulses:     Intact distal pulses.   Edema:     Peripheral edema absent.          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 No signs of recurrence. He does have occasional PVCs  He does have occasional sharp pain - lasting seconds. Unrelated to exertion. Not relieved with rest. Can happen in the middle of the night. Often on the right side of the chest. This is non cardiac chest pain. Coronary angiogram in 2021 showed no obstruction, slightly sluggish flow likely endothelial vs microvascular dysfunction. Stress test in the past was negative. He can not exercise. Discussed with him - no change in therapy. Continue statins, diabetic treatment. Stress test should symptoms worsen.

## 2025-07-17 ENCOUNTER — OFFICE VISIT (OUTPATIENT)
Dept: CARDIOLOGY | Facility: CLINIC | Age: 70
End: 2025-07-17
Payer: MEDICARE

## 2025-07-17 VITALS
SYSTOLIC BLOOD PRESSURE: 135 MMHG | WEIGHT: 222.7 LBS | HEART RATE: 115 BPM | BODY MASS INDEX: 29.51 KG/M2 | OXYGEN SATURATION: 94 % | DIASTOLIC BLOOD PRESSURE: 89 MMHG | HEIGHT: 73 IN

## 2025-07-17 DIAGNOSIS — I47.19 AVNRT (AV NODAL RE-ENTRY TACHYCARDIA): Primary | ICD-10-CM

## 2025-07-17 LAB
ATRIAL RATE: 109 BPM
P AXIS: 44 DEGREES
P OFFSET: 183 MS
P ONSET: 124 MS
PR INTERVAL: 174 MS
Q ONSET: 211 MS
QRS COUNT: 18 BEATS
QRS DURATION: 102 MS
QT INTERVAL: 344 MS
QTC CALCULATION(BAZETT): 463 MS
QTC FREDERICIA: 419 MS
R AXIS: -43 DEGREES
T AXIS: 67 DEGREES
T OFFSET: 383 MS
VENTRICULAR RATE: 109 BPM

## 2025-07-17 PROCEDURE — 1159F MED LIST DOCD IN RCRD: CPT | Performed by: INTERNAL MEDICINE

## 2025-07-17 PROCEDURE — 93005 ELECTROCARDIOGRAM TRACING: CPT | Performed by: INTERNAL MEDICINE

## 2025-07-17 PROCEDURE — 99212 OFFICE O/P EST SF 10 MIN: CPT

## 2025-07-17 PROCEDURE — 99214 OFFICE O/P EST MOD 30 MIN: CPT | Performed by: INTERNAL MEDICINE

## 2025-07-17 PROCEDURE — 1126F AMNT PAIN NOTED NONE PRSNT: CPT | Performed by: INTERNAL MEDICINE

## 2025-07-17 PROCEDURE — 1036F TOBACCO NON-USER: CPT | Performed by: INTERNAL MEDICINE

## 2025-07-17 PROCEDURE — G2211 COMPLEX E/M VISIT ADD ON: HCPCS | Performed by: INTERNAL MEDICINE

## 2025-07-17 PROCEDURE — 3079F DIAST BP 80-89 MM HG: CPT | Performed by: INTERNAL MEDICINE

## 2025-07-17 PROCEDURE — 3008F BODY MASS INDEX DOCD: CPT | Performed by: INTERNAL MEDICINE

## 2025-07-17 PROCEDURE — 3052F HG A1C>EQUAL 8.0%<EQUAL 9.0%: CPT | Performed by: INTERNAL MEDICINE

## 2025-07-17 PROCEDURE — 3075F SYST BP GE 130 - 139MM HG: CPT | Performed by: INTERNAL MEDICINE

## 2025-07-17 PROCEDURE — 4010F ACE/ARB THERAPY RXD/TAKEN: CPT | Performed by: INTERNAL MEDICINE

## 2025-07-17 PROCEDURE — 1160F RVW MEDS BY RX/DR IN RCRD: CPT | Performed by: INTERNAL MEDICINE

## 2025-07-17 ASSESSMENT — ENCOUNTER SYMPTOMS
LOSS OF SENSATION IN FEET: 1
OCCASIONAL FEELINGS OF UNSTEADINESS: 0
DEPRESSION: 0

## 2025-07-17 ASSESSMENT — PAIN SCALES - GENERAL: PAINLEVEL_OUTOF10: 0-NO PAIN

## (undated) DEVICE — CATHETHER, CS, BI-DIRECTIONAL, 10 POLES, D-F TYPE

## (undated) DEVICE — DEVICE, CLOSURE, PERCLOSE, PROSTYLE

## (undated) DEVICE — CATHETER, PARVINS, STEERABLE TIP, 7FR, D-CURVE, OCTOPOLAR

## (undated) DEVICE — INTRODUCER, HEMO, FAST-CATH, 8.5 FR X 63 CM, SR0 038GW, G2

## (undated) DEVICE — CATHETER, NAVISTAR, 7FR X 115CM, NS,4P,D, 1-7-4-MM, HYP

## (undated) DEVICE — INTRODUCER, HEMOSTASIS, STR/J .038 IN, 8.5FR 12CM

## (undated) DEVICE — CATHETER, QUADRAPOLAR, 2-5-2MM, 115CM, YELLOW, W/ REDEL

## (undated) DEVICE — CLOSURE SYSTEM, VASCULAR, MVP 6-12FR, VENOUS

## (undated) DEVICE — CABLE, CATH TO CARTO SYSTEM, 12 HYP/10 REDEL (REPROCESSED)

## (undated) DEVICE — PAD, ELECTRODE DEFIB PADPRO ADULT STRL W/ADAPTER

## (undated) DEVICE — INTRODUCER, SHEATH, FAST-CATH, 7FR X 12CM, C-LOCK

## (undated) DEVICE — INTRODUCER, SHEATH, FAST-CATH, 8FR X 12CM, C-LOCK

## (undated) DEVICE — INTERFACE CABLE, EXISITING DX CATHETERS, BLUE PORT (REPROCESS)

## (undated) DEVICE — PATCHES, EXTERNAL REFERENCE, CARTO3